# Patient Record
Sex: MALE | Race: WHITE | Employment: UNEMPLOYED | URBAN - METROPOLITAN AREA
[De-identification: names, ages, dates, MRNs, and addresses within clinical notes are randomized per-mention and may not be internally consistent; named-entity substitution may affect disease eponyms.]

---

## 2019-03-08 ENCOUNTER — HOSPITAL ENCOUNTER (EMERGENCY)
Age: 54
Discharge: HOME | End: 2019-03-08
Attending: EMERGENCY MEDICINE
Payer: COMMERCIAL

## 2019-03-08 ENCOUNTER — APPOINTMENT (OUTPATIENT)
Dept: GENERAL RADIOLOGY | Age: 54
End: 2019-03-08
Payer: COMMERCIAL

## 2019-03-08 VITALS
SYSTOLIC BLOOD PRESSURE: 113 MMHG | TEMPERATURE: 98 F | OXYGEN SATURATION: 98 % | RESPIRATION RATE: 16 BRPM | HEART RATE: 86 BPM | HEIGHT: 72 IN | DIASTOLIC BLOOD PRESSURE: 90 MMHG | BODY MASS INDEX: 33.09 KG/M2 | WEIGHT: 244.27 LBS

## 2019-03-08 DIAGNOSIS — H65.00 ACUTE SEROUS OTITIS MEDIA, RECURRENCE NOT SPECIFIED, UNSPECIFIED LATERALITY: ICD-10-CM

## 2019-03-08 DIAGNOSIS — J40 BRONCHITIS: Primary | ICD-10-CM

## 2019-03-08 LAB
RAPID INFLUENZA  B AGN: NEGATIVE
RAPID INFLUENZA A AGN: NEGATIVE
S PYO AG THROAT QL: NEGATIVE

## 2019-03-08 PROCEDURE — 6370000000 HC RX 637 (ALT 250 FOR IP): Performed by: EMERGENCY MEDICINE

## 2019-03-08 PROCEDURE — 99284 EMERGENCY DEPT VISIT MOD MDM: CPT

## 2019-03-08 PROCEDURE — 87880 STREP A ASSAY W/OPTIC: CPT

## 2019-03-08 PROCEDURE — 94664 DEMO&/EVAL PT USE INHALER: CPT

## 2019-03-08 PROCEDURE — 94640 AIRWAY INHALATION TREATMENT: CPT

## 2019-03-08 PROCEDURE — 87081 CULTURE SCREEN ONLY: CPT

## 2019-03-08 PROCEDURE — 87804 INFLUENZA ASSAY W/OPTIC: CPT

## 2019-03-08 PROCEDURE — 71046 X-RAY EXAM CHEST 2 VIEWS: CPT

## 2019-03-08 RX ORDER — AMOXICILLIN 500 MG/1
1000 CAPSULE ORAL 2 TIMES DAILY
Qty: 40 CAPSULE | Refills: 0 | Status: SHIPPED | OUTPATIENT
Start: 2019-03-08 | End: 2019-03-18

## 2019-03-08 RX ORDER — PREDNISONE 20 MG/1
60 TABLET ORAL ONCE
Status: COMPLETED | OUTPATIENT
Start: 2019-03-08 | End: 2019-03-08

## 2019-03-08 RX ORDER — GUAIFENESIN AND CODEINE PHOSPHATE 100; 10 MG/5ML; MG/5ML
5 SOLUTION ORAL 3 TIMES DAILY PRN
Qty: 30 ML | Refills: 0 | Status: SHIPPED | OUTPATIENT
Start: 2019-03-08 | End: 2019-03-11

## 2019-03-08 RX ORDER — IPRATROPIUM BROMIDE AND ALBUTEROL SULFATE 2.5; .5 MG/3ML; MG/3ML
1 SOLUTION RESPIRATORY (INHALATION) ONCE
Status: COMPLETED | OUTPATIENT
Start: 2019-03-08 | End: 2019-03-08

## 2019-03-08 RX ORDER — ALBUTEROL SULFATE 90 UG/1
2 AEROSOL, METERED RESPIRATORY (INHALATION) EVERY 4 HOURS PRN
Qty: 1 INHALER | Refills: 0 | Status: SHIPPED | OUTPATIENT
Start: 2019-03-08 | End: 2020-03-02

## 2019-03-08 RX ORDER — PREDNISONE 50 MG/1
50 TABLET ORAL DAILY
Qty: 5 TABLET | Refills: 0 | Status: SHIPPED | OUTPATIENT
Start: 2019-03-08 | End: 2019-03-13

## 2019-03-08 RX ADMIN — PREDNISONE 60 MG: 20 TABLET ORAL at 04:08

## 2019-03-08 RX ADMIN — IPRATROPIUM BROMIDE AND ALBUTEROL SULFATE 1 AMPULE: .5; 3 SOLUTION RESPIRATORY (INHALATION) at 03:46

## 2019-03-08 ASSESSMENT — ENCOUNTER SYMPTOMS
CHOKING: 0
NAUSEA: 0
SHORTNESS OF BREATH: 1
EYE PAIN: 0
COLOR CHANGE: 0
CHEST TIGHTNESS: 0
ANAL BLEEDING: 0
EYE DISCHARGE: 0
WHEEZING: 0
EYE ITCHING: 0
RECTAL PAIN: 0
STRIDOR: 0
EYE REDNESS: 0
APNEA: 0
COUGH: 1
PHOTOPHOBIA: 0
CONSTIPATION: 0
ABDOMINAL DISTENTION: 0
VOMITING: 0
ABDOMINAL PAIN: 0
BLOOD IN STOOL: 0
DIARRHEA: 0
BACK PAIN: 0

## 2019-03-08 ASSESSMENT — PAIN SCALES - GENERAL
PAINLEVEL_OUTOF10: 8
PAINLEVEL_OUTOF10: 8

## 2019-03-08 ASSESSMENT — PAIN DESCRIPTION - LOCATION: LOCATION: THROAT

## 2019-03-08 ASSESSMENT — PAIN DESCRIPTION - ONSET: ONSET: GRADUAL

## 2019-03-08 ASSESSMENT — PAIN DESCRIPTION - PROGRESSION: CLINICAL_PROGRESSION: GRADUALLY WORSENING

## 2019-03-08 ASSESSMENT — PAIN DESCRIPTION - PAIN TYPE: TYPE: ACUTE PAIN

## 2019-03-08 ASSESSMENT — PAIN DESCRIPTION - DESCRIPTORS: DESCRIPTORS: SORE

## 2019-03-08 ASSESSMENT — PAIN DESCRIPTION - FREQUENCY: FREQUENCY: CONTINUOUS

## 2019-03-10 LAB — S PYO THROAT QL CULT: NORMAL

## 2019-04-10 ENCOUNTER — APPOINTMENT (OUTPATIENT)
Dept: GENERAL RADIOLOGY | Age: 54
End: 2019-04-10
Payer: COMMERCIAL

## 2019-04-10 ENCOUNTER — HOSPITAL ENCOUNTER (EMERGENCY)
Age: 54
Discharge: HOME | End: 2019-04-10
Payer: COMMERCIAL

## 2019-04-10 VITALS
OXYGEN SATURATION: 98 % | HEART RATE: 72 BPM | RESPIRATION RATE: 16 BRPM | SYSTOLIC BLOOD PRESSURE: 147 MMHG | TEMPERATURE: 97.8 F | DIASTOLIC BLOOD PRESSURE: 99 MMHG

## 2019-04-10 DIAGNOSIS — S20.212A RIB CONTUSION, LEFT, INITIAL ENCOUNTER: ICD-10-CM

## 2019-04-10 DIAGNOSIS — S86.912A KNEE STRAIN, LEFT, INITIAL ENCOUNTER: ICD-10-CM
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
## 2019-04-10 DIAGNOSIS — S39.012A BACK STRAIN, INITIAL ENCOUNTER: ICD-10-CM

## 2019-04-10 PROCEDURE — 71101 X-RAY EXAM UNILAT RIBS/CHEST: CPT

## 2019-04-10 PROCEDURE — 6370000000 HC RX 637 (ALT 250 FOR IP): Performed by: PHYSICIAN ASSISTANT

## 2019-04-10 PROCEDURE — 73560 X-RAY EXAM OF KNEE 1 OR 2: CPT

## 2019-04-10 PROCEDURE — 99284 EMERGENCY DEPT VISIT MOD MDM: CPT

## 2019-04-10 PROCEDURE — 72100 X-RAY EXAM L-S SPINE 2/3 VWS: CPT

## 2019-04-10 RX ORDER — OXYCODONE HYDROCHLORIDE AND ACETAMINOPHEN 5; 325 MG/1; MG/1
1 TABLET ORAL ONCE
Status: COMPLETED | OUTPATIENT
Start: 2019-04-10 | End: 2019-04-10

## 2019-04-10 RX ORDER — IBUPROFEN 600 MG/1
600 TABLET ORAL EVERY 6 HOURS PRN
Qty: 30 TABLET | Refills: 0 | Status: ON HOLD | OUTPATIENT
Start: 2019-04-10 | End: 2019-11-08 | Stop reason: HOSPADM

## 2019-04-10 RX ADMIN — OXYCODONE HYDROCHLORIDE AND ACETAMINOPHEN 1 TABLET: 5; 325 TABLET ORAL at 18:41

## 2019-04-10 ASSESSMENT — PAIN DESCRIPTION - ONSET: ONSET: SUDDEN

## 2019-04-10 ASSESSMENT — PAIN SCALES - GENERAL
PAINLEVEL_OUTOF10: 7
PAINLEVEL_OUTOF10: 3
PAINLEVEL_OUTOF10: 7

## 2019-04-10 ASSESSMENT — PAIN DESCRIPTION - PROGRESSION: CLINICAL_PROGRESSION: NOT CHANGED

## 2019-04-10 ASSESSMENT — PAIN DESCRIPTION - FREQUENCY: FREQUENCY: CONTINUOUS

## 2019-04-10 ASSESSMENT — PAIN DESCRIPTION - PAIN TYPE: TYPE: ACUTE PAIN

## 2019-04-10 ASSESSMENT — PAIN DESCRIPTION - DESCRIPTORS: DESCRIPTORS: ACHING

## 2019-04-10 ASSESSMENT — ENCOUNTER SYMPTOMS
NAUSEA: 0
BACK PAIN: 1
SHORTNESS OF BREATH: 0

## 2019-04-10 NOTE — ED TRIAGE NOTES
tripped over wife yesterday and hit his left ribs, twisted the left knee, and has right shoulder pain and low back pain .

## 2019-04-10 NOTE — ED NOTES
Dc instructions completed with pt. Pt verbalized understanding. rx x1. Pt refused crutches. Pt was taken to exit in wheelchair and wife drove home.      Froilan Prabhakar RN  04/10/19 6432

## 2019-04-11 NOTE — ED PROVIDER NOTES
[facility]  eMERGENCY dEPARTMENT eNCOUnter      Pt Name: Geeta Hood  MRN: [id number]  [place] 1965  Date of evaluation: 4/10/2019  Provider: Arun Ayala PA-C    CHIEF COMPLAINT       Chief Complaint   Patient presents with   Gabriel Avina     tripped over wife yesterday and hit his left ribs, twisted the left knee, and has right shoulder pain and low back pain . HISTORYOF PRESENT ILLNESS  (Location/Symptom, Timing/Onset, Context/Setting, Quality, Duration, Modifying Factors, Severity.)   Oral RODRIGO Prince is a 48 y.o. male who presents to the emergency department after a fall. Last night the patient tripped over his wife who is lying on the ground, struck his left ribs on the far and twisted his left knee. Since then he has had left rib pain, left knee pain and low back pain. Pain is constant and worse with movement or weightbearing. He took ibuprofen 2 hours ago. This has not helped the pain. He rates the pain 7/10. He denies numbness or weakness, shortness of breath, abdominal pain. There was no head injury. Denies neck pain. Nursing Notes were reviewedand I agree. REVIEW OF SYSTEMS    (2-9 systems for level 4, 10 or more forlevel 5)     Review of Systems   Constitutional: Negative for chills and fever. Respiratory: Negative for shortness of breath. Cardiovascular: Positive for chest pain (rib pain from injury). Gastrointestinal: Negative for nausea. Genitourinary: Negative for difficulty urinating. Musculoskeletal: Positive for arthralgias and back pain. Negative for neck pain. Skin: Negative for rash and wound. Neurological: Negative for weakness, numbness and headaches. All other systems reviewed and are negative. Except as noted above the remainder ofthe review of systems was reviewed and negative.        PAST MEDICALHISTORY         Diagnosis Date    Arthritis     Depression        SURGICAL HISTORY           Procedure Laterality Date    FRACTURE SURGERY      LEG SURGERY Right     SHOULDER SURGERY      x 4       CURRENT MEDICATIONS       Discharge Medication List as of 4/10/2019  7:34 PM      CONTINUE these medications which have NOT CHANGED    Details   albuterol sulfate HFA (PROVENTIL HFA) 108 (90 Base) MCG/ACT inhaler Inhale 2 puffs into the lungs every 4 hours as needed for Wheezing or Shortness of Breath (Space out to every 6 hours as symptoms improve) Space out to every 6 hours as symptoms improve., Disp-1 Inhaler, R-0Print             ALLERGIES     Reglan [metoclopramide]    FAMILY HISTORY     History reviewed. No pertinent family history. No family status information on file. SOCIAL HISTORY    reports that he has been smoking cigarettes. He has been smoking about 1.00 pack per day. He has never used smokeless tobacco. He reports that he drinks alcohol. He reports that he does not use drugs. PHYSICAL EXAM    (up to 7 for level 4, 8 or more for level 5)     ED Triage Vitals [04/10/19 1808]   BP Temp Temp src Pulse Resp SpO2 Height Weight   (!) 147/99 97.8 °F (36.6 °C) -- 72 16 98 % -- --       Physical Exam   Constitutional: He is oriented to person, place, and time. He appears well-developed and well-nourished. No distress. HENT:   Head: Normocephalic and atraumatic. Neck: Neck supple. Cardiovascular: Normal rate, regular rhythm and normal heart sounds. Pulmonary/Chest: Effort normal and breath sounds normal. No respiratory distress. He exhibits tenderness (lateral aspect of left ribs with ecchymosis and abrasions, no flail chest or crepitus). Musculoskeletal: Normal range of motion. Tenderness and mild swelling about left knee. No effusion noted. Full range of motion with intact extensor mechanism. Bilateral paraspinous low back tenderness without midline tenderness. Neurological: He is alert and oriented to person, place, and time. No sensory deficit. He exhibits normal muscle tone.    Skin: Skin is warm and dry. Psychiatric: He has a normal mood and affect. His behavior is normal.   Nursing note and vitals reviewed. DIAGNOSTIC RESULTS     RADIOLOGY:   Non-plain film images such as CT, Ultrasound and MRI are read by the radiologist.Plain radiographic images are visualized and preliminarily interpreted by Martín Ball PA-C with the below findings:        Interpretation per the Radiologist below, if available at the time of this note:    XR RIBS LEFT INCLUDE CHEST (MIN 3 VIEWS)   Final Result   1. No acute cardiopulmonary disease. 2. No evidence of a left-sided rib fracture. XR LUMBAR SPINE (2-3 VIEWS)   Final Result   No acute bony injury lumbar spine. Mild multilevel degenerative disc changes   within the lower thoracic and lumbar spine. XR KNEE LEFT (1-2 VIEWS)   Final Result   No acute bony injury left knee. LABS:  Labs Reviewed - No data to display    All other labs were within normal range or not returned as of this dictation. EMERGENCY DEPARTMENT COURSE and DIFFERENTIAL DIAGNOSIS/MDM:   Vitals:    Vitals:    04/10/19 1808   BP: (!) 147/99   Pulse: 72   Resp: 16   Temp: 97.8 °F (36.6 °C)   SpO2: 98%        I have evaluated this patient. My supervising physician was available for consultation. This is a mechanical fall. He is neurovascularly intact. There is no head injury. He was sent for x-ray of left knee, lumbar spine and left ribs and chest all of which were unremarkable. He was reassured. His pain was treated while here with Percocet. He is chronically on Norco.  I haven't advised scheduled ibuprofen in addition to Destiney Barrio.  He was told to use ice packs 20 minutes at a time several times daily. He was asked to follow up with orthopedics next week for reassessment. Discussed results, diagnosis and plan with patient and/or family. Questions addressed. Dispositionand follow-up agreed upon. Specific discharge instructions explained.  The patient and/or family and I have discussed the diagnosis and risks, and we agree with discharging home to follow-up with their primary care,specialist or referral doctor. We also discussed returning to the Emergency Department immediately if new or worsening symptoms occur. We have discussed the symptoms which are most concerning that necessitate immediatereturn. PROCEDURES:  None    FINAL IMPRESSION      1. Fall, initial encounter    2. Knee strain, left, initial encounter    3. Rib contusion, left, initial encounter    4.  Back strain, initial encounter          DISPOSITION/PLAN   DISPOSITION Decision To Discharge 04/10/2019 07:27:30 PM      PATIENT REFERRED TO:  Janie Santoyo, [address] [facility]  [address] [place] [address]  [phone]    Schedule an appointment as soon as possible for a visit       [facility] Emergency Department  [address] [place] [address]  [phone]    As needed, If symptoms worsen      DISCHARGE MEDICATIONS:  Discharge Medication List as of 4/10/2019  7:34 PM      START taking these medications    Details   ibuprofen (IBU) 600 MG tablet Take 1 tablet by mouth every 6 hours as needed for Pain, Disp-30 tablet, R-0Print             (Please note that portions of this note were completed with a voice recognition program.  Efforts were made toedit the dictations but occasionally words are mis-transcribed.)    EYAL Aburto PA-C  04/10/19 3054

## 2019-06-16 ENCOUNTER — HOSPITAL ENCOUNTER (EMERGENCY)
Age: 54
Discharge: HOME | End: 2019-06-16
Attending: EMERGENCY MEDICINE
Payer: COMMERCIAL

## 2019-06-16 ENCOUNTER — APPOINTMENT (OUTPATIENT)
Dept: CT IMAGING | Age: 54
End: 2019-06-16
Payer: COMMERCIAL

## 2019-06-16 VITALS
TEMPERATURE: 98.9 F | RESPIRATION RATE: 16 BRPM | HEART RATE: 102 BPM | DIASTOLIC BLOOD PRESSURE: 61 MMHG | OXYGEN SATURATION: 96 % | WEIGHT: 234.79 LBS | SYSTOLIC BLOOD PRESSURE: 122 MMHG | BODY MASS INDEX: 31.84 KG/M2

## 2019-06-16 DIAGNOSIS — R10.11 RIGHT UPPER QUADRANT ABDOMINAL PAIN: Primary | ICD-10-CM

## 2019-06-16 LAB
A/G RATIO: 1.7 (ref 1.1–2.2)
ALBUMIN SERPL-MCNC: 4.3 G/DL (ref 3.4–5)
ALP BLD-CCNC: 87 U/L (ref 40–129)
ALT SERPL-CCNC: 37 U/L (ref 10–40)
ANION GAP SERPL CALCULATED.3IONS-SCNC: 15 MMOL/L (ref 3–16)
AST SERPL-CCNC: 47 U/L (ref 15–37)
BILIRUB SERPL-MCNC: 0.6 MG/DL (ref 0–1)
BUN BLDV-MCNC: 17 MG/DL (ref 7–20)
CALCIUM SERPL-MCNC: 8.7 MG/DL (ref 8.3–10.6)
CHLORIDE BLD-SCNC: 101 MMOL/L (ref 99–110)
CO2: 22 MMOL/L (ref 21–32)
CREAT SERPL-MCNC: 1.2 MG/DL (ref 0.9–1.3)
GFR AFRICAN AMERICAN: >60
GFR NON-AFRICAN AMERICAN: >60
GLOBULIN: 2.5 G/DL
GLUCOSE BLD-MCNC: 105 MG/DL (ref 70–99)
HCT VFR BLD CALC: 44.9 % (ref 40.5–52.5)
HEMOGLOBIN: 15.1 G/DL (ref 13.5–17.5)
LIPASE: 21 U/L (ref 13–60)
MCH RBC QN AUTO: 30.1 PG (ref 26–34)
MCHC RBC AUTO-ENTMCNC: 33.7 G/DL (ref 31–36)
MCV RBC AUTO: 89.3 FL (ref 80–100)
PDW BLD-RTO: 13.9 % (ref 12.4–15.4)
PLATELET # BLD: 341 K/UL (ref 135–450)
PMV BLD AUTO: 8.9 FL (ref 5–10.5)
POTASSIUM REFLEX MAGNESIUM: 4 MMOL/L (ref 3.5–5.1)
RBC # BLD: 5.03 M/UL (ref 4.2–5.9)
SODIUM BLD-SCNC: 138 MMOL/L (ref 136–145)
TOTAL PROTEIN: 6.8 G/DL (ref 6.4–8.2)
WBC # BLD: 10.1 K/UL (ref 4–11)

## 2019-06-16 PROCEDURE — 96375 TX/PRO/DX INJ NEW DRUG ADDON: CPT

## 2019-06-16 PROCEDURE — 85027 COMPLETE CBC AUTOMATED: CPT

## 2019-06-16 PROCEDURE — 96374 THER/PROPH/DIAG INJ IV PUSH: CPT

## 2019-06-16 PROCEDURE — 6360000002 HC RX W HCPCS: Performed by: EMERGENCY MEDICINE

## 2019-06-16 PROCEDURE — 99284 EMERGENCY DEPT VISIT MOD MDM: CPT

## 2019-06-16 PROCEDURE — 80053 COMPREHEN METABOLIC PANEL: CPT

## 2019-06-16 PROCEDURE — 74176 CT ABD & PELVIS W/O CONTRAST: CPT

## 2019-06-16 PROCEDURE — 83690 ASSAY OF LIPASE: CPT

## 2019-06-16 RX ORDER — MORPHINE SULFATE 2 MG/ML
4 INJECTION, SOLUTION INTRAMUSCULAR; INTRAVENOUS ONCE
Status: COMPLETED | OUTPATIENT
Start: 2019-06-16 | End: 2019-06-16

## 2019-06-16 RX ORDER — DEXTROAMPHETAMINE SACCHARATE, AMPHETAMINE ASPARTATE, DEXTROAMPHETAMINE SULFATE AND AMPHETAMINE SULFATE 5; 5; 5; 5 MG/1; MG/1; MG/1; MG/1
20 TABLET ORAL DAILY
COMMUNITY
End: 2019-08-29 | Stop reason: SDUPTHER

## 2019-06-16 RX ORDER — KETOROLAC TROMETHAMINE 30 MG/ML
15 INJECTION, SOLUTION INTRAMUSCULAR; INTRAVENOUS ONCE
Status: COMPLETED | OUTPATIENT
Start: 2019-06-16 | End: 2019-06-16

## 2019-06-16 RX ADMIN — KETOROLAC TROMETHAMINE 15 MG: 30 INJECTION, SOLUTION INTRAMUSCULAR at 02:53

## 2019-06-16 RX ADMIN — MORPHINE SULFATE 4 MG: 2 INJECTION, SOLUTION INTRAMUSCULAR; INTRAVENOUS at 03:30

## 2019-06-16 ASSESSMENT — PAIN DESCRIPTION - FREQUENCY: FREQUENCY: CONTINUOUS

## 2019-06-16 ASSESSMENT — PAIN DESCRIPTION - ONSET: ONSET: ON-GOING

## 2019-06-16 ASSESSMENT — PAIN DESCRIPTION - LOCATION
LOCATION: ABDOMEN
LOCATION: ABDOMEN

## 2019-06-16 ASSESSMENT — PAIN DESCRIPTION - PROGRESSION: CLINICAL_PROGRESSION: GRADUALLY WORSENING

## 2019-06-16 ASSESSMENT — PAIN SCALES - GENERAL
PAINLEVEL_OUTOF10: 8
PAINLEVEL_OUTOF10: 8
PAINLEVEL_OUTOF10: 6
PAINLEVEL_OUTOF10: 8

## 2019-06-16 ASSESSMENT — PAIN DESCRIPTION - DESCRIPTORS: DESCRIPTORS: SQUEEZING;DISCOMFORT

## 2019-06-16 ASSESSMENT — PAIN DESCRIPTION - PAIN TYPE
TYPE: ACUTE PAIN
TYPE: ACUTE PAIN

## 2019-06-16 ASSESSMENT — PAIN DESCRIPTION - ORIENTATION: ORIENTATION: RIGHT;UPPER

## 2019-06-16 ASSESSMENT — PAIN - FUNCTIONAL ASSESSMENT: PAIN_FUNCTIONAL_ASSESSMENT: ACTIVITIES ARE NOT PREVENTED

## 2019-08-19 ENCOUNTER — HOSPITAL ENCOUNTER (OUTPATIENT)
Dept: ULTRASOUND IMAGING | Age: 54
Discharge: HOME | End: 2019-08-19
Payer: COMMERCIAL

## 2019-08-19 DIAGNOSIS — Z00.00 EXAMINATION, MEDICAL, GENERAL: ICD-10-CM

## 2019-08-22 ENCOUNTER — APPOINTMENT (OUTPATIENT)
Dept: ULTRASOUND IMAGING | Age: 54
End: 2019-08-22
Payer: COMMERCIAL

## 2019-08-22 ENCOUNTER — APPOINTMENT (OUTPATIENT)
Dept: CT IMAGING | Age: 54
End: 2019-08-22
Payer: COMMERCIAL

## 2019-08-22 ENCOUNTER — HOSPITAL ENCOUNTER (EMERGENCY)
Age: 54
Discharge: HOME | End: 2019-08-22
Attending: EMERGENCY MEDICINE
Payer: COMMERCIAL

## 2019-08-22 VITALS
RESPIRATION RATE: 16 BRPM | OXYGEN SATURATION: 100 % | SYSTOLIC BLOOD PRESSURE: 127 MMHG | BODY MASS INDEX: 31.72 KG/M2 | HEART RATE: 88 BPM | WEIGHT: 233.91 LBS | TEMPERATURE: 98 F | DIASTOLIC BLOOD PRESSURE: 70 MMHG

## 2019-08-22 DIAGNOSIS — R10.9 FLANK PAIN: Primary | ICD-10-CM

## 2019-08-22 DIAGNOSIS — R30.0 DYSURIA: ICD-10-CM

## 2019-08-22 LAB
A/G RATIO: 1.7 (ref 1.1–2.2)
ALBUMIN SERPL-MCNC: 4.2 G/DL (ref 3.4–5)
ALP BLD-CCNC: 90 U/L (ref 40–129)
ALT SERPL-CCNC: 24 U/L (ref 10–40)
ANION GAP SERPL CALCULATED.3IONS-SCNC: 11 MMOL/L (ref 3–16)
AST SERPL-CCNC: 21 U/L (ref 15–37)
BASOPHILS ABSOLUTE: 0.1 K/UL (ref 0–0.2)
BASOPHILS RELATIVE PERCENT: 1.1 %
BILIRUB SERPL-MCNC: 0.5 MG/DL (ref 0–1)
BILIRUBIN URINE: ABNORMAL
BLOOD, URINE: ABNORMAL
BUN BLDV-MCNC: 13 MG/DL (ref 7–20)
CALCIUM SERPL-MCNC: 9.1 MG/DL (ref 8.3–10.6)
CHLORIDE BLD-SCNC: 102 MMOL/L (ref 99–110)
CLARITY: CLEAR
CO2: 23 MMOL/L (ref 21–32)
COLOR: ABNORMAL
CREAT SERPL-MCNC: 1.2 MG/DL (ref 0.9–1.3)
EKG ATRIAL RATE: 59 BPM
EKG DIAGNOSIS: NORMAL
EKG P AXIS: 53 DEGREES
EKG P-R INTERVAL: 162 MS
EKG Q-T INTERVAL: 380 MS
EKG QRS DURATION: 92 MS
EKG QTC CALCULATION (BAZETT): 376 MS
EKG R AXIS: 55 DEGREES
EKG T AXIS: 49 DEGREES
EKG VENTRICULAR RATE: 59 BPM
EOSINOPHILS ABSOLUTE: 0.2 K/UL (ref 0–0.6)
EOSINOPHILS RELATIVE PERCENT: 2.4 %
EPITHELIAL CELLS, UA: 0 /HPF (ref 0–5)
GFR AFRICAN AMERICAN: >60
GFR NON-AFRICAN AMERICAN: >60
GLOBULIN: 2.5 G/DL
GLUCOSE BLD-MCNC: 120 MG/DL (ref 70–99)
GLUCOSE URINE: ABNORMAL MG/DL
HCT VFR BLD CALC: 41.8 % (ref 40.5–52.5)
HEMOGLOBIN: 14.2 G/DL (ref 13.5–17.5)
HYALINE CASTS: 1 /LPF (ref 0–8)
KETONES, URINE: ABNORMAL MG/DL
LEUKOCYTE ESTERASE, URINE: ABNORMAL
LIPASE: 34 U/L (ref 13–60)
LYMPHOCYTES ABSOLUTE: 1.5 K/UL (ref 1–5.1)
LYMPHOCYTES RELATIVE PERCENT: 23.2 %
MCH RBC QN AUTO: 29.5 PG (ref 26–34)
MCHC RBC AUTO-ENTMCNC: 33.9 G/DL (ref 31–36)
MCV RBC AUTO: 86.9 FL (ref 80–100)
MICROSCOPIC EXAMINATION: YES
MONOCYTES ABSOLUTE: 0.6 K/UL (ref 0–1.3)
MONOCYTES RELATIVE PERCENT: 9.5 %
NEUTROPHILS ABSOLUTE: 4.2 K/UL (ref 1.7–7.7)
NEUTROPHILS RELATIVE PERCENT: 63.8 %
NITRITE, URINE: ABNORMAL
PDW BLD-RTO: 16.4 % (ref 12.4–15.4)
PH UA: ABNORMAL (ref 5–8)
PLATELET # BLD: 225 K/UL (ref 135–450)
PMV BLD AUTO: 9.4 FL (ref 5–10.5)
POTASSIUM REFLEX MAGNESIUM: 4.3 MMOL/L (ref 3.5–5.1)
PROTEIN UA: ABNORMAL MG/DL
RBC # BLD: 4.8 M/UL (ref 4.2–5.9)
RBC UA: 1 /HPF (ref 0–4)
SODIUM BLD-SCNC: 136 MMOL/L (ref 136–145)
SPECIFIC GRAVITY UA: >1.03 (ref 1–1.03)
TOTAL CK: 125 U/L (ref 39–308)
TOTAL PROTEIN: 6.7 G/DL (ref 6.4–8.2)
TROPONIN: <0.01 NG/ML
URINE REFLEX TO CULTURE: ABNORMAL
URINE TYPE: ABNORMAL
UROBILINOGEN, URINE: ABNORMAL E.U./DL
WBC # BLD: 6.7 K/UL (ref 4–11)
WBC UA: 1 /HPF (ref 0–5)

## 2019-08-22 PROCEDURE — 96365 THER/PROPH/DIAG IV INF INIT: CPT

## 2019-08-22 PROCEDURE — 99284 EMERGENCY DEPT VISIT MOD MDM: CPT

## 2019-08-22 PROCEDURE — 82550 ASSAY OF CK (CPK): CPT

## 2019-08-22 PROCEDURE — 84484 ASSAY OF TROPONIN QUANT: CPT

## 2019-08-22 PROCEDURE — 93005 ELECTROCARDIOGRAM TRACING: CPT | Performed by: EMERGENCY MEDICINE

## 2019-08-22 PROCEDURE — 6360000002 HC RX W HCPCS: Performed by: EMERGENCY MEDICINE

## 2019-08-22 PROCEDURE — 83690 ASSAY OF LIPASE: CPT

## 2019-08-22 PROCEDURE — 76705 ECHO EXAM OF ABDOMEN: CPT

## 2019-08-22 PROCEDURE — 96375 TX/PRO/DX INJ NEW DRUG ADDON: CPT

## 2019-08-22 PROCEDURE — 85025 COMPLETE CBC W/AUTO DIFF WBC: CPT

## 2019-08-22 PROCEDURE — 6360000004 HC RX CONTRAST MEDICATION

## 2019-08-22 PROCEDURE — 80053 COMPREHEN METABOLIC PANEL: CPT

## 2019-08-22 PROCEDURE — 2580000003 HC RX 258: Performed by: EMERGENCY MEDICINE

## 2019-08-22 PROCEDURE — 87086 URINE CULTURE/COLONY COUNT: CPT

## 2019-08-22 PROCEDURE — 93010 ELECTROCARDIOGRAM REPORT: CPT | Performed by: INTERNAL MEDICINE

## 2019-08-22 PROCEDURE — 96376 TX/PRO/DX INJ SAME DRUG ADON: CPT

## 2019-08-22 PROCEDURE — 71275 CT ANGIOGRAPHY CHEST: CPT

## 2019-08-22 PROCEDURE — 74176 CT ABD & PELVIS W/O CONTRAST: CPT

## 2019-08-22 PROCEDURE — 81001 URINALYSIS AUTO W/SCOPE: CPT

## 2019-08-22 RX ORDER — CIPROFLOXACIN 500 MG/1
500 TABLET, FILM COATED ORAL 2 TIMES DAILY
Qty: 20 TABLET | Refills: 0 | Status: SHIPPED | OUTPATIENT
Start: 2019-08-22 | End: 2019-09-01

## 2019-08-22 RX ORDER — TRAMADOL HYDROCHLORIDE 50 MG/1
50 TABLET ORAL EVERY 6 HOURS PRN
Qty: 10 TABLET | Refills: 0 | Status: SHIPPED | OUTPATIENT
Start: 2019-08-22 | End: 2019-09-05 | Stop reason: SDUPTHER

## 2019-08-22 RX ORDER — ONDANSETRON 2 MG/ML
4 INJECTION INTRAMUSCULAR; INTRAVENOUS ONCE
Status: COMPLETED | OUTPATIENT
Start: 2019-08-22 | End: 2019-08-22

## 2019-08-22 RX ADMIN — HYDROMORPHONE HYDROCHLORIDE 0.5 MG: 1 INJECTION, SOLUTION INTRAMUSCULAR; INTRAVENOUS; SUBCUTANEOUS at 16:32

## 2019-08-22 RX ADMIN — IOPAMIDOL 75 ML: 755 INJECTION, SOLUTION INTRAVENOUS at 15:54

## 2019-08-22 RX ADMIN — ONDANSETRON 4 MG: 2 INJECTION INTRAMUSCULAR; INTRAVENOUS at 13:51

## 2019-08-22 RX ADMIN — HYDROMORPHONE HYDROCHLORIDE 0.5 MG: 1 INJECTION, SOLUTION INTRAMUSCULAR; INTRAVENOUS; SUBCUTANEOUS at 13:53

## 2019-08-22 RX ADMIN — CEFTRIAXONE 1 G: 1 INJECTION, POWDER, FOR SOLUTION INTRAMUSCULAR; INTRAVENOUS at 16:35

## 2019-08-22 RX ADMIN — HYDROMORPHONE HYDROCHLORIDE 0.5 MG: 1 INJECTION, SOLUTION INTRAMUSCULAR; INTRAVENOUS; SUBCUTANEOUS at 15:09

## 2019-08-22 ASSESSMENT — PAIN DESCRIPTION - LOCATION
LOCATION: BACK
LOCATION: FLANK

## 2019-08-22 ASSESSMENT — PAIN - FUNCTIONAL ASSESSMENT
PAIN_FUNCTIONAL_ASSESSMENT: ACTIVITIES ARE NOT PREVENTED
PAIN_FUNCTIONAL_ASSESSMENT: 0-10
PAIN_FUNCTIONAL_ASSESSMENT: PREVENTS OR INTERFERES SOME ACTIVE ACTIVITIES AND ADLS

## 2019-08-22 ASSESSMENT — PAIN SCALES - GENERAL
PAINLEVEL_OUTOF10: 8
PAINLEVEL_OUTOF10: 6
PAINLEVEL_OUTOF10: 7
PAINLEVEL_OUTOF10: 8
PAINLEVEL_OUTOF10: 8
PAINLEVEL_OUTOF10: 6
PAINLEVEL_OUTOF10: 6
PAINLEVEL_OUTOF10: 8
PAINLEVEL_OUTOF10: 6
PAINLEVEL_OUTOF10: 8

## 2019-08-22 ASSESSMENT — PAIN DESCRIPTION - FREQUENCY
FREQUENCY: CONTINUOUS

## 2019-08-22 ASSESSMENT — PAIN DESCRIPTION - ORIENTATION
ORIENTATION: RIGHT;LEFT

## 2019-08-22 ASSESSMENT — PAIN DESCRIPTION - PROGRESSION
CLINICAL_PROGRESSION: NOT CHANGED
CLINICAL_PROGRESSION: NOT CHANGED
CLINICAL_PROGRESSION: GRADUALLY WORSENING
CLINICAL_PROGRESSION: GRADUALLY WORSENING
CLINICAL_PROGRESSION: GRADUALLY IMPROVING

## 2019-08-22 ASSESSMENT — PAIN DESCRIPTION - DESCRIPTORS: DESCRIPTORS: ACHING;DULL

## 2019-08-22 ASSESSMENT — PAIN DESCRIPTION - PAIN TYPE
TYPE: ACUTE PAIN

## 2019-08-22 ASSESSMENT — PAIN DESCRIPTION - ONSET
ONSET: PROGRESSIVE
ONSET: ON-GOING
ONSET: ON-GOING

## 2019-08-22 NOTE — ED TRIAGE NOTES
Introduce myself to the patient, identification band inplace, stretcher in the lowest position for safety, and the call light is in reach. Updated patient on Emergency Department plan of care, no additional needs at this time, will continue to monitor patient.

## 2019-08-22 NOTE — ED NOTES
Pt requesting fluids; per Dr. Gary Goodson pt can have fluids.       Media NICOLE Parker  08/22/19 5677

## 2019-08-22 NOTE — ED PROVIDER NOTES
rebound. No masses. Negative Jones's sign. Unable to elicit any abdominal discomfort to palpation. No scrotal or testicular pain. Musculoskeletal: Extremities non-tender with full range of motion. Radial and dorsalis pedis pulses were intact. No calf tenderness erythema or edema. Mild tenderness noted in the bilateral lower lumbar areas. Mild discomfort with range of motion. No spasm. No point tenderness or step-off. Negative straight leg raising. Deep tendon reflexes were 2/4 bilaterally in the patellar and Achilles tendons. No saddle anesthesia. Neurological: Alert and oriented x 3. Speech clear. Cranial nerves II-XII intact. No facial droop. No acute focal motor or sensory deficits. Skin: Skin is warm and dry. No rash. Psychiatric: Normal mood and affect. Behavior is normal.         DIAGNOSTIC RESULTS     EKG: All EKG's are interpreted by the Emergency Department Physician who either signs or Co-signs this chart in the absence of a cardiologist.    Sinus bradycardia. Rate 59. DC interval 162 ms. QRS duration 92 ms. QTc 376 ms.  R axis 55 degrees. Normal EKG. RADIOLOGY:   Non-plain film images such as CT, Ultrasound and MRI are read by the radiologist. Plain radiographic images are visualized and preliminarily interpreted by the emergency physician with the below findings:        Interpretation per the Radiologist below, if available at the time of this note:    CTA CHEST ABDOMEN PELVIS W CONTRAST   Final Result   No aortic dissection. No acute intrathoracic nor abdominopelvic abnormality. US GALLBLADDER RUQ   Final Result   Limited is exam.  Patient is not NPO. Partially contracted gallbladder. No stones are evident. No significant dilation of the common duct. Mild hepatic steatosis. CT ABDOMEN PELVIS WO CONTRAST Additional Contrast? None   Final Result   No acute abnormality in the abdomen or pelvis.   Specifically, no evidence of urolithiasis.                ED BEDSIDE ULTRASOUND:   Performed by ED Physician - none    LABS:  Labs Reviewed   CBC WITH AUTO DIFFERENTIAL - Abnormal; Notable for the following components:       Result Value    RDW 16.4 (*)     All other components within normal limits    Narrative:     Performed at:  [facility]  [address] [facility] [address]   Phone [phone]   COMPREHENSIVE METABOLIC PANEL W/ REFLEX TO MG FOR LOW K - Abnormal; Notable for the following components:    Glucose 120 (*)     All other components within normal limits    Narrative:     Performed at:  [address] [address]   Phone [phone]   URINE RT REFLEX TO CULTURE - Abnormal; Notable for the following components:    Color, UA ORANGE (*)     Glucose, Ur Color Interfer (*)     Bilirubin Urine Color Interfer (*)     Ketones, Urine Color Interfer (*)     Blood, Urine Color Interfer (*)     pH, UA Color Interfer (*)     Protein, UA Color Interfer (*)     Urobilinogen, Urine Color Interfer (*)     Nitrite, Urine Color Interfer (*)     Leukocyte Esterase, Urine Color Interfer (*)     All other components within normal limits    Narrative:     Performed at:  [address] [address]   Phone [phone]   URINE CULTURE   LIPASE    Narrative:     Performed at:  [facility]  [address] [place] [address]   Phone [phone]   MICROSCOPIC URINALYSIS    Narrative:     Performed at:  [facility]  [address] [place] [address]   Phone [phone]   TROPONIN    Narrative:     Performed at:  [address] [address]   Phone [phone]   CK    Narrative:     Performed at:  [facility]  08 patient is feeling markedly improved. He is nontoxic in appearance. He is afebrile. He is hemodynamically stable. He is stable for outpatient management. He will be treated with Cipro 500 mg twice daily. Symptoms are suspicious for urinary tract infection but I suspect that it is partially treated. He was given Rocephin in the emergency department. Culture is pending. Differential diagnosis would also include prostatitis. I advised him to follow-up with his primary care physician in 1 to 2 days for reexamination. If his condition worsens or new symptoms develop, he was advised to return immediately to the emergency department. If his symptoms persist he may require referral to a urologist.  He actually reports that he plans to see a urologist anyway. He has already called to make an appointment. He is also had some separate symptoms in which she experiences epigastric discomfort after eating fatty foods. He may require further evaluation with a HIDA scan and I advised him to contact his primary care physician to schedule this to be done. CRITICAL CARE TIME   Total Critical Care time was 0 minutes, excluding separately reportable procedures. There was a high probability of clinically significant/life threatening deterioration in the patient's condition which required my urgent intervention. CONSULTS:  None    PROCEDURES:  Unless otherwise noted below, none     Procedures        FINAL IMPRESSION      1. Flank pain    2. Dysuria          DISPOSITION/PLAN   DISPOSITION        PATIENT REFERRED TO:  [facility]) Referral  Call [phone] for an appointment  Call today        DISCHARGE MEDICATIONS:  New Prescriptions    CIPROFLOXACIN (CIPRO) 500 MG TABLET    Take 1 tablet by mouth 2 times daily for 10 days    TRAMADOL (ULTRAM) 50 MG TABLET    Take 1 tablet by mouth every 6 hours as needed for Pain for up to 3 days. Intended supply: 3 days.  Take lowest dose possible to manage pain     Controlled

## 2019-08-22 NOTE — ED NOTES
Pt A&O to the ED; c/o flank pain that started x1 month. Pt stated able to tolerate until 3 days ago pain level became unbearable. Pt rates pain level 8/10; describes pain radiating from back to hips and down to knees. Pt states being nauseated and having diarrhea x1 day; denies seeing any blood. Pt stated having burning while urinating and increase frequency. Pt stated no history of kidney stones.        Tab Dwyer RN  08/22/19 5406 [facility], RN  08/22/19 3585

## 2019-08-22 NOTE — ED NOTES
Discharge and education instructions reviewed. Patient verbalized understanding, teach-back successful. Patient denied questions at this time. No acute distress noted. Patient instructed to follow-up as noted - return to emergency department if symptoms worsen. Patient verbalized understanding. Discharged per EDMD with discharged instructions.        Sima De La Cruz RN  08/22/19 0234

## 2019-08-24 LAB — URINE CULTURE, ROUTINE: NORMAL

## 2019-10-04 ENCOUNTER — APPOINTMENT (OUTPATIENT)
Dept: GENERAL RADIOLOGY | Age: 54
End: 2019-10-04
Payer: COMMERCIAL

## 2019-10-04 ENCOUNTER — HOSPITAL ENCOUNTER (EMERGENCY)
Age: 54
Discharge: HOME | End: 2019-10-04
Payer: COMMERCIAL

## 2019-10-04 VITALS
HEART RATE: 73 BPM | DIASTOLIC BLOOD PRESSURE: 92 MMHG | SYSTOLIC BLOOD PRESSURE: 137 MMHG | RESPIRATION RATE: 16 BRPM | TEMPERATURE: 97.5 F | OXYGEN SATURATION: 99 %
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
## 2019-10-04 DIAGNOSIS — M25.531 RIGHT WRIST PAIN: ICD-10-CM

## 2019-10-04 DIAGNOSIS — T14.8XXA ABRASION: ICD-10-CM

## 2019-10-04 DIAGNOSIS — Z23 NEED FOR TDAP VACCINATION: ICD-10-CM

## 2019-10-04 DIAGNOSIS — S99.921A INJURY OF TOE ON RIGHT FOOT, INITIAL ENCOUNTER: ICD-10-CM

## 2019-10-04 DIAGNOSIS — M25.521 RIGHT ELBOW PAIN: ICD-10-CM

## 2019-10-04 DIAGNOSIS — S49.91XA INJURY OF RIGHT SHOULDER, INITIAL ENCOUNTER: ICD-10-CM

## 2019-10-04 PROCEDURE — 6370000000 HC RX 637 (ALT 250 FOR IP): Performed by: NURSE PRACTITIONER

## 2019-10-04 PROCEDURE — 90471 IMMUNIZATION ADMIN: CPT | Performed by: NURSE PRACTITIONER

## 2019-10-04 PROCEDURE — 96372 THER/PROPH/DIAG INJ SC/IM: CPT

## 2019-10-04 PROCEDURE — 90715 TDAP VACCINE 7 YRS/> IM: CPT | Performed by: NURSE PRACTITIONER

## 2019-10-04 PROCEDURE — 73630 X-RAY EXAM OF FOOT: CPT

## 2019-10-04 PROCEDURE — 73110 X-RAY EXAM OF WRIST: CPT

## 2019-10-04 PROCEDURE — 6360000002 HC RX W HCPCS: Performed by: NURSE PRACTITIONER

## 2019-10-04 PROCEDURE — 73080 X-RAY EXAM OF ELBOW: CPT

## 2019-10-04 PROCEDURE — 99284 EMERGENCY DEPT VISIT MOD MDM: CPT

## 2019-10-04 PROCEDURE — 73030 X-RAY EXAM OF SHOULDER: CPT

## 2019-10-04 RX ORDER — ORPHENADRINE CITRATE 30 MG/ML
60 INJECTION INTRAMUSCULAR; INTRAVENOUS ONCE
Status: COMPLETED | OUTPATIENT
Start: 2019-10-04 | End: 2019-10-04

## 2019-10-04 RX ORDER — HYDROCODONE BITARTRATE AND ACETAMINOPHEN 5; 325 MG/1; MG/1
2 TABLET ORAL ONCE
Status: COMPLETED | OUTPATIENT
Start: 2019-10-04 | End: 2019-10-04

## 2019-10-04 RX ORDER — HYDROCODONE BITARTRATE AND ACETAMINOPHEN 5; 325 MG/1; MG/1
1 TABLET ORAL EVERY 6 HOURS PRN
Qty: 3 TABLET | Refills: 0 | Status: SHIPPED | OUTPATIENT
Start: 2019-10-04 | End: 2019-10-07

## 2019-10-04 RX ORDER — KETOROLAC TROMETHAMINE 30 MG/ML
30 INJECTION, SOLUTION INTRAMUSCULAR; INTRAVENOUS ONCE
Status: COMPLETED | OUTPATIENT
Start: 2019-10-04 | End: 2019-10-04

## 2019-10-04 RX ORDER — CYCLOBENZAPRINE HCL 10 MG
10 TABLET ORAL 3 TIMES DAILY PRN
Qty: 20 TABLET | Refills: 0 | Status: SHIPPED | OUTPATIENT
Start: 2019-10-04 | End: 2019-10-11

## 2019-10-04 RX ORDER — HYDROCODONE BITARTRATE AND ACETAMINOPHEN 5; 325 MG/1; MG/1
1 TABLET ORAL EVERY 6 HOURS PRN
Qty: 6 TABLET | Refills: 0 | Status: SHIPPED | OUTPATIENT
Start: 2019-10-04 | End: 2019-10-04 | Stop reason: SDUPTHER

## 2019-10-04 RX ADMIN — TETANUS TOXOID, REDUCED DIPHTHERIA TOXOID AND ACELLULAR PERTUSSIS VACCINE, ADSORBED 0.5 ML: 5; 2.5; 8; 8; 2.5 SUSPENSION INTRAMUSCULAR at 11:58

## 2019-10-04 RX ADMIN — KETOROLAC TROMETHAMINE 30 MG: 30 INJECTION, SOLUTION INTRAMUSCULAR at 13:03

## 2019-10-04 RX ADMIN — HYDROCODONE BITARTRATE AND ACETAMINOPHEN 2 TABLET: 5; 325 TABLET ORAL at 11:58

## 2019-10-04 RX ADMIN — ORPHENADRINE CITRATE 60 MG: 30 INJECTION INTRAMUSCULAR; INTRAVENOUS at 11:58

## 2019-10-04 ASSESSMENT — PAIN DESCRIPTION - DESCRIPTORS
DESCRIPTORS: ACHING
DESCRIPTORS: SHARP

## 2019-10-04 ASSESSMENT — PAIN SCALES - GENERAL
PAINLEVEL_OUTOF10: 10

## 2019-10-04 ASSESSMENT — PAIN DESCRIPTION - PAIN TYPE
TYPE: ACUTE PAIN

## 2019-10-04 ASSESSMENT — PAIN DESCRIPTION - FREQUENCY
FREQUENCY: CONTINUOUS

## 2019-10-04 ASSESSMENT — PAIN DESCRIPTION - LOCATION: LOCATION: SHOULDER;WRIST;ANKLE

## 2019-10-04 ASSESSMENT — PAIN DESCRIPTION - ORIENTATION
ORIENTATION: RIGHT

## 2019-10-04 ASSESSMENT — PAIN - FUNCTIONAL ASSESSMENT: PAIN_FUNCTIONAL_ASSESSMENT: 0-10

## 2019-11-04 ENCOUNTER — OFFICE VISIT (OUTPATIENT)
Dept: ORTHOPEDIC SURGERY | Age: 54
End: 2019-11-04
Payer: COMMERCIAL

## 2019-11-04 VITALS
SYSTOLIC BLOOD PRESSURE: 150 MMHG | DIASTOLIC BLOOD PRESSURE: 88 MMHG | BODY MASS INDEX: 30.75 KG/M2 | RESPIRATION RATE: 17 BRPM | HEIGHT: 72 IN | WEIGHT: 227 LBS | HEART RATE: 80 BPM

## 2019-11-04 DIAGNOSIS — M25.511 CHRONIC PAIN OF BOTH SHOULDERS: ICD-10-CM

## 2019-11-04 DIAGNOSIS — M75.121 COMPLETE TEAR OF RIGHT ROTATOR CUFF, UNSPECIFIED WHETHER TRAUMATIC: Primary | ICD-10-CM

## 2019-11-04 DIAGNOSIS — M25.512 CHRONIC PAIN OF BOTH SHOULDERS: ICD-10-CM

## 2019-11-04 DIAGNOSIS — G89.29 CHRONIC PAIN OF BOTH SHOULDERS: ICD-10-CM

## 2019-11-04 PROCEDURE — G8484 FLU IMMUNIZE NO ADMIN: HCPCS | Performed by: ORTHOPAEDIC SURGERY

## 2019-11-04 PROCEDURE — G8427 DOCREV CUR MEDS BY ELIG CLIN: HCPCS | Performed by: ORTHOPAEDIC SURGERY

## 2019-11-04 PROCEDURE — 99243 OFF/OP CNSLTJ NEW/EST LOW 30: CPT | Performed by: ORTHOPAEDIC SURGERY

## 2019-11-04 PROCEDURE — G8417 CALC BMI ABV UP PARAM F/U: HCPCS | Performed by: ORTHOPAEDIC SURGERY

## 2019-11-07 ENCOUNTER — ANESTHESIA EVENT (OUTPATIENT)
Dept: OPERATING ROOM | Age: 54
End: 2019-11-07
Payer: COMMERCIAL

## 2019-11-08 ENCOUNTER — HOSPITAL ENCOUNTER (OUTPATIENT)
Age: 54
Setting detail: OUTPATIENT SURGERY
Discharge: HOME | End: 2019-11-08
Attending: ORTHOPAEDIC SURGERY | Admitting: ORTHOPAEDIC SURGERY
Payer: COMMERCIAL

## 2019-11-08 ENCOUNTER — ANESTHESIA (OUTPATIENT)
Dept: OPERATING ROOM | Age: 54
End: 2019-11-08
Payer: COMMERCIAL

## 2019-11-08 VITALS
DIASTOLIC BLOOD PRESSURE: 58 MMHG | TEMPERATURE: 96.4 F | RESPIRATION RATE: 16 BRPM | OXYGEN SATURATION: 100 % | SYSTOLIC BLOOD PRESSURE: 110 MMHG

## 2019-11-08 VITALS
HEIGHT: 72 IN | SYSTOLIC BLOOD PRESSURE: 133 MMHG | HEART RATE: 69 BPM | RESPIRATION RATE: 16 BRPM | TEMPERATURE: 97.4 F | BODY MASS INDEX: 31 KG/M2 | WEIGHT: 228.84 LBS | DIASTOLIC BLOOD PRESSURE: 87 MMHG | OXYGEN SATURATION: 96 %

## 2019-11-08 DIAGNOSIS — M75.121 COMPLETE TEAR OF RIGHT ROTATOR CUFF, UNSPECIFIED WHETHER TRAUMATIC: Primary | ICD-10-CM

## 2019-11-08 PROCEDURE — 3600000004 HC SURGERY LEVEL 4 BASE: Performed by: ORTHOPAEDIC SURGERY

## 2019-11-08 PROCEDURE — 29827 SHO ARTHRS SRG RT8TR CUF RPR: CPT | Performed by: ORTHOPAEDIC SURGERY

## 2019-11-08 PROCEDURE — 2580000003 HC RX 258: Performed by: ANESTHESIOLOGY

## 2019-11-08 PROCEDURE — 3600000014 HC SURGERY LEVEL 4 ADDTL 15MIN: Performed by: ORTHOPAEDIC SURGERY

## 2019-11-08 PROCEDURE — 7100000000 HC PACU RECOVERY - FIRST 15 MIN: Performed by: ORTHOPAEDIC SURGERY

## 2019-11-08 PROCEDURE — L3809 WHFO W/O JOINTS PRE OTS: HCPCS | Performed by: ORTHOPAEDIC SURGERY

## 2019-11-08 PROCEDURE — 2720000010 HC SURG SUPPLY STERILE: Performed by: ORTHOPAEDIC SURGERY

## 2019-11-08 PROCEDURE — 2500000003 HC RX 250 WO HCPCS: Performed by: ANESTHESIOLOGY

## 2019-11-08 PROCEDURE — 3700000000 HC ANESTHESIA ATTENDED CARE: Performed by: ORTHOPAEDIC SURGERY

## 2019-11-08 PROCEDURE — 29826 SHO ARTHRS SRG DECOMPRESSION: CPT | Performed by: ORTHOPAEDIC SURGERY

## 2019-11-08 PROCEDURE — 6360000002 HC RX W HCPCS: Performed by: ORTHOPAEDIC SURGERY

## 2019-11-08 PROCEDURE — C1713 ANCHOR/SCREW BN/BN,TIS/BN: HCPCS | Performed by: ORTHOPAEDIC SURGERY

## 2019-11-08 PROCEDURE — 6360000002 HC RX W HCPCS: Performed by: NURSE ANESTHETIST, CERTIFIED REGISTERED

## 2019-11-08 PROCEDURE — 7100000011 HC PHASE II RECOVERY - ADDTL 15 MIN: Performed by: ORTHOPAEDIC SURGERY

## 2019-11-08 PROCEDURE — 2709999900 HC NON-CHARGEABLE SUPPLY: Performed by: ORTHOPAEDIC SURGERY

## 2019-11-08 PROCEDURE — 3700000001 HC ADD 15 MINUTES (ANESTHESIA): Performed by: ORTHOPAEDIC SURGERY

## 2019-11-08 PROCEDURE — 6360000002 HC RX W HCPCS: Performed by: ANESTHESIOLOGY

## 2019-11-08 PROCEDURE — 64415 NJX AA&/STRD BRCH PLXS IMG: CPT | Performed by: ANESTHESIOLOGY

## 2019-11-08 PROCEDURE — 7100000010 HC PHASE II RECOVERY - FIRST 15 MIN: Performed by: ORTHOPAEDIC SURGERY

## 2019-11-08 PROCEDURE — 2500000003 HC RX 250 WO HCPCS: Performed by: NURSE ANESTHETIST, CERTIFIED REGISTERED

## 2019-11-08 DEVICE — ANCHOR SUT L24.5MM DIA4.75MM BIOCOMPOSITE SELF PUNCHING: Type: IMPLANTABLE DEVICE | Site: SHOULDER | Status: FUNCTIONAL

## 2019-11-08 DEVICE — SYSTEM IMPL L24MM DIA5.5MM BIOCOMP INCL 2 SWIVELOCK SP FOR: Type: IMPLANTABLE DEVICE | Site: SHOULDER | Status: FUNCTIONAL

## 2019-11-08 RX ORDER — FENTANYL CITRATE 50 UG/ML
INJECTION, SOLUTION INTRAMUSCULAR; INTRAVENOUS PRN
Status: DISCONTINUED | OUTPATIENT
Start: 2019-11-08 | End: 2019-11-08 | Stop reason: SDUPTHER

## 2019-11-08 RX ORDER — MEPERIDINE HYDROCHLORIDE 25 MG/ML
12.5 INJECTION INTRAMUSCULAR; INTRAVENOUS; SUBCUTANEOUS EVERY 5 MIN PRN
Status: DISCONTINUED | OUTPATIENT
Start: 2019-11-08 | End: 2019-11-08 | Stop reason: HOSPADM

## 2019-11-08 RX ORDER — KETOROLAC TROMETHAMINE 30 MG/ML
INJECTION, SOLUTION INTRAMUSCULAR; INTRAVENOUS PRN
Status: DISCONTINUED | OUTPATIENT
Start: 2019-11-08 | End: 2019-11-08 | Stop reason: SDUPTHER

## 2019-11-08 RX ORDER — MIDAZOLAM HYDROCHLORIDE 1 MG/ML
INJECTION INTRAMUSCULAR; INTRAVENOUS PRN
Status: DISCONTINUED | OUTPATIENT
Start: 2019-11-08 | End: 2019-11-08 | Stop reason: SDUPTHER

## 2019-11-08 RX ORDER — ONDANSETRON 2 MG/ML
4 INJECTION INTRAMUSCULAR; INTRAVENOUS
Status: DISCONTINUED | OUTPATIENT
Start: 2019-11-08 | End: 2019-11-08 | Stop reason: HOSPADM

## 2019-11-08 RX ORDER — PROMETHAZINE HYDROCHLORIDE 25 MG/1
25 TABLET ORAL EVERY 6 HOURS PRN
Qty: 5 TABLET | Refills: 0 | Status: SHIPPED | OUTPATIENT
Start: 2019-11-08 | End: 2020-01-27

## 2019-11-08 RX ORDER — SODIUM CHLORIDE 0.9 % (FLUSH) 0.9 %
10 SYRINGE (ML) INJECTION EVERY 12 HOURS SCHEDULED
Status: DISCONTINUED | OUTPATIENT
Start: 2019-11-08 | End: 2019-11-08 | Stop reason: HOSPADM

## 2019-11-08 RX ORDER — SODIUM CHLORIDE 9 MG/ML
INJECTION, SOLUTION INTRAVENOUS CONTINUOUS
Status: DISCONTINUED | OUTPATIENT
Start: 2019-11-08 | End: 2019-11-08 | Stop reason: HOSPADM

## 2019-11-08 RX ORDER — OXYCODONE HYDROCHLORIDE AND ACETAMINOPHEN 5; 325 MG/1; MG/1
1 TABLET ORAL PRN
Status: DISCONTINUED | OUTPATIENT
Start: 2019-11-08 | End: 2019-11-08 | Stop reason: HOSPADM

## 2019-11-08 RX ORDER — LABETALOL HYDROCHLORIDE 5 MG/ML
5 INJECTION, SOLUTION INTRAVENOUS EVERY 10 MIN PRN
Status: DISCONTINUED | OUTPATIENT
Start: 2019-11-08 | End: 2019-11-08 | Stop reason: HOSPADM

## 2019-11-08 RX ORDER — LIDOCAINE HYDROCHLORIDE 20 MG/ML
INJECTION, SOLUTION EPIDURAL; INFILTRATION; INTRACAUDAL; PERINEURAL PRN
Status: DISCONTINUED | OUTPATIENT
Start: 2019-11-08 | End: 2019-11-08 | Stop reason: SDUPTHER

## 2019-11-08 RX ORDER — MORPHINE SULFATE 2 MG/ML
2 INJECTION, SOLUTION INTRAMUSCULAR; INTRAVENOUS EVERY 5 MIN PRN
Status: DISCONTINUED | OUTPATIENT
Start: 2019-11-08 | End: 2019-11-08 | Stop reason: HOSPADM

## 2019-11-08 RX ORDER — MIDAZOLAM HYDROCHLORIDE 1 MG/ML
INJECTION INTRAMUSCULAR; INTRAVENOUS
Status: COMPLETED
Start: 2019-11-08 | End: 2019-11-08

## 2019-11-08 RX ORDER — BUPIVACAINE HYDROCHLORIDE 5 MG/ML
INJECTION, SOLUTION EPIDURAL; INTRACAUDAL
Status: COMPLETED
Start: 2019-11-08 | End: 2019-11-08

## 2019-11-08 RX ORDER — SUCCINYLCHOLINE CHLORIDE 20 MG/ML
INJECTION INTRAMUSCULAR; INTRAVENOUS PRN
Status: DISCONTINUED | OUTPATIENT
Start: 2019-11-08 | End: 2019-11-08 | Stop reason: SDUPTHER

## 2019-11-08 RX ORDER — OXYCODONE HYDROCHLORIDE AND ACETAMINOPHEN 5; 325 MG/1; MG/1
2 TABLET ORAL PRN
Status: DISCONTINUED | OUTPATIENT
Start: 2019-11-08 | End: 2019-11-08 | Stop reason: HOSPADM

## 2019-11-08 RX ORDER — BUPIVACAINE HYDROCHLORIDE 5 MG/ML
INJECTION, SOLUTION EPIDURAL; INTRACAUDAL
Status: DISCONTINUED | OUTPATIENT
Start: 2019-11-08 | End: 2019-11-08 | Stop reason: SDUPTHER

## 2019-11-08 RX ORDER — PROPOFOL 10 MG/ML
INJECTION, EMULSION INTRAVENOUS PRN
Status: DISCONTINUED | OUTPATIENT
Start: 2019-11-08 | End: 2019-11-08 | Stop reason: SDUPTHER

## 2019-11-08 RX ORDER — LIDOCAINE HYDROCHLORIDE 20 MG/ML
INJECTION, SOLUTION EPIDURAL; INFILTRATION; INTRACAUDAL; PERINEURAL
Status: COMPLETED
Start: 2019-11-08 | End: 2019-11-08

## 2019-11-08 RX ORDER — OXYCODONE HYDROCHLORIDE AND ACETAMINOPHEN 5; 325 MG/1; MG/1
1 TABLET ORAL EVERY 4 HOURS PRN
Qty: 40 TABLET | Refills: 0 | Status: SHIPPED | OUTPATIENT
Start: 2019-11-08 | End: 2019-11-15

## 2019-11-08 RX ORDER — POVIDONE-IODINE 10 MG/G
OINTMENT TOPICAL
Status: COMPLETED | OUTPATIENT
Start: 2019-11-08 | End: 2019-11-08

## 2019-11-08 RX ORDER — DEXAMETHASONE SODIUM PHOSPHATE 4 MG/ML
INJECTION, SOLUTION INTRA-ARTICULAR; INTRALESIONAL; INTRAMUSCULAR; INTRAVENOUS; SOFT TISSUE PRN
Status: DISCONTINUED | OUTPATIENT
Start: 2019-11-08 | End: 2019-11-08 | Stop reason: SDUPTHER

## 2019-11-08 RX ORDER — MORPHINE SULFATE 2 MG/ML
1 INJECTION, SOLUTION INTRAMUSCULAR; INTRAVENOUS EVERY 5 MIN PRN
Status: DISCONTINUED | OUTPATIENT
Start: 2019-11-08 | End: 2019-11-08 | Stop reason: HOSPADM

## 2019-11-08 RX ORDER — SODIUM CHLORIDE 0.9 % (FLUSH) 0.9 %
10 SYRINGE (ML) INJECTION PRN
Status: DISCONTINUED | OUTPATIENT
Start: 2019-11-08 | End: 2019-11-08 | Stop reason: HOSPADM

## 2019-11-08 RX ORDER — HYDRALAZINE HYDROCHLORIDE 20 MG/ML
5 INJECTION INTRAMUSCULAR; INTRAVENOUS
Status: DISCONTINUED | OUTPATIENT
Start: 2019-11-08 | End: 2019-11-08 | Stop reason: HOSPADM

## 2019-11-08 RX ORDER — ONDANSETRON 2 MG/ML
INJECTION INTRAMUSCULAR; INTRAVENOUS PRN
Status: DISCONTINUED | OUTPATIENT
Start: 2019-11-08 | End: 2019-11-08 | Stop reason: SDUPTHER

## 2019-11-08 RX ADMIN — ONDANSETRON 4 MG: 2 INJECTION INTRAMUSCULAR; INTRAVENOUS at 07:41

## 2019-11-08 RX ADMIN — KETOROLAC TROMETHAMINE 30 MG: 30 INJECTION, SOLUTION INTRAMUSCULAR at 07:57

## 2019-11-08 RX ADMIN — Medication 2 G: at 07:20

## 2019-11-08 RX ADMIN — MIDAZOLAM 2 MG: 1 INJECTION INTRAMUSCULAR; INTRAVENOUS at 07:16

## 2019-11-08 RX ADMIN — MEPERIDINE HYDROCHLORIDE 12.5 MG: 25 INJECTION INTRAMUSCULAR; INTRAVENOUS; SUBCUTANEOUS at 09:10

## 2019-11-08 RX ADMIN — SODIUM CHLORIDE: 9 INJECTION, SOLUTION INTRAVENOUS at 06:41

## 2019-11-08 RX ADMIN — SODIUM CHLORIDE: 9 INJECTION, SOLUTION INTRAVENOUS at 07:12

## 2019-11-08 RX ADMIN — LIDOCAINE HYDROCHLORIDE 50 MG: 20 INJECTION, SOLUTION EPIDURAL; INFILTRATION; INTRACAUDAL; PERINEURAL at 07:22

## 2019-11-08 RX ADMIN — BUPIVACAINE HYDROCHLORIDE 30 ML: 5 INJECTION, SOLUTION EPIDURAL; INTRACAUDAL; PERINEURAL at 09:55

## 2019-11-08 RX ADMIN — SUCCINYLCHOLINE CHLORIDE 120 MG: 20 INJECTION, SOLUTION INTRAMUSCULAR; INTRAVENOUS at 07:22

## 2019-11-08 RX ADMIN — FENTANYL CITRATE 50 MCG: 50 INJECTION INTRAMUSCULAR; INTRAVENOUS at 07:35

## 2019-11-08 RX ADMIN — FENTANYL CITRATE 50 MCG: 50 INJECTION INTRAMUSCULAR; INTRAVENOUS at 07:22

## 2019-11-08 RX ADMIN — DEXAMETHASONE SODIUM PHOSPHATE 8 MG: 4 INJECTION, SOLUTION INTRAMUSCULAR; INTRAVENOUS at 07:41

## 2019-11-08 RX ADMIN — PROPOFOL 200 MG: 10 INJECTION, EMULSION INTRAVENOUS at 07:22

## 2019-11-08 ASSESSMENT — PULMONARY FUNCTION TESTS
PIF_VALUE: 14
PIF_VALUE: 16
PIF_VALUE: 13
PIF_VALUE: 18
PIF_VALUE: 14
PIF_VALUE: 31
PIF_VALUE: 3
PIF_VALUE: 18
PIF_VALUE: 19
PIF_VALUE: 13
PIF_VALUE: 18
PIF_VALUE: 18
PIF_VALUE: 19
PIF_VALUE: 0
PIF_VALUE: 14
PIF_VALUE: 18
PIF_VALUE: 12
PIF_VALUE: 14
PIF_VALUE: 18
PIF_VALUE: 19
PIF_VALUE: 18
PIF_VALUE: 16
PIF_VALUE: 18
PIF_VALUE: 18
PIF_VALUE: 13
PIF_VALUE: 18
PIF_VALUE: 18
PIF_VALUE: 48
PIF_VALUE: 18
PIF_VALUE: 18
PIF_VALUE: 0
PIF_VALUE: 14
PIF_VALUE: 18
PIF_VALUE: 14
PIF_VALUE: 18
PIF_VALUE: 18
PIF_VALUE: 16
PIF_VALUE: 18
PIF_VALUE: 19
PIF_VALUE: 17
PIF_VALUE: 18
PIF_VALUE: 15
PIF_VALUE: 19
PIF_VALUE: 14
PIF_VALUE: 18
PIF_VALUE: 1
PIF_VALUE: 18
PIF_VALUE: 13
PIF_VALUE: 18
PIF_VALUE: 15
PIF_VALUE: 18
PIF_VALUE: 25
PIF_VALUE: 18
PIF_VALUE: 14
PIF_VALUE: 18
PIF_VALUE: 19
PIF_VALUE: 18
PIF_VALUE: 21

## 2019-11-08 ASSESSMENT — ENCOUNTER SYMPTOMS: SHORTNESS OF BREATH: 0

## 2019-11-08 ASSESSMENT — PAIN DESCRIPTION - DESCRIPTORS: DESCRIPTORS: ACHING

## 2019-11-08 ASSESSMENT — PAIN SCALES - GENERAL
PAINLEVEL_OUTOF10: 0

## 2019-11-08 ASSESSMENT — PAIN - FUNCTIONAL ASSESSMENT
PAIN_FUNCTIONAL_ASSESSMENT: PREVENTS OR INTERFERES SOME ACTIVE ACTIVITIES AND ADLS
PAIN_FUNCTIONAL_ASSESSMENT: 0-10

## 2019-11-12 ENCOUNTER — OFFICE VISIT (OUTPATIENT)
Dept: ORTHOPEDIC SURGERY | Age: 54
End: 2019-11-12
Payer: COMMERCIAL

## 2019-11-12 VITALS — BODY MASS INDEX: 31 KG/M2 | HEIGHT: 72 IN | WEIGHT: 228.84 LBS | RESPIRATION RATE: 17 BRPM | HEART RATE: 82 BPM

## 2019-11-12 DIAGNOSIS — M75.121 COMPLETE TEAR OF RIGHT ROTATOR CUFF, UNSPECIFIED WHETHER TRAUMATIC: Primary | ICD-10-CM

## 2019-11-12 PROCEDURE — L3670 SO ACRO/CLAV CAN WEB PRE OTS: HCPCS | Performed by: ORTHOPAEDIC SURGERY

## 2019-11-12 PROCEDURE — 99024 POSTOP FOLLOW-UP VISIT: CPT | Performed by: ORTHOPAEDIC SURGERY

## 2019-11-18 ENCOUNTER — TELEPHONE (OUTPATIENT)
Dept: ORTHOPEDIC SURGERY | Age: 54
End: 2019-11-18

## 2019-11-18 DIAGNOSIS — M75.121 COMPLETE TEAR OF RIGHT ROTATOR CUFF, UNSPECIFIED WHETHER TRAUMATIC: Primary | ICD-10-CM

## 2019-11-18 RX ORDER — OXYCODONE HYDROCHLORIDE AND ACETAMINOPHEN 5; 325 MG/1; MG/1
1 TABLET ORAL EVERY 8 HOURS PRN
Qty: 21 TABLET | Refills: 0 | Status: SHIPPED | OUTPATIENT
Start: 2019-11-18 | End: 2019-11-25

## 2019-11-25 ENCOUNTER — TELEPHONE (OUTPATIENT)
Dept: ORTHOPEDIC SURGERY | Age: 54
End: 2019-11-25

## 2019-11-26 ENCOUNTER — OFFICE VISIT (OUTPATIENT)
Dept: ORTHOPEDIC SURGERY | Age: 54
End: 2019-11-26

## 2019-11-26 VITALS — HEIGHT: 72 IN | RESPIRATION RATE: 18 BRPM | BODY MASS INDEX: 31 KG/M2 | WEIGHT: 228.84 LBS

## 2019-11-26 DIAGNOSIS — M75.121 COMPLETE TEAR OF RIGHT ROTATOR CUFF, UNSPECIFIED WHETHER TRAUMATIC: Primary | ICD-10-CM

## 2019-11-26 PROCEDURE — 99024 POSTOP FOLLOW-UP VISIT: CPT | Performed by: ORTHOPAEDIC SURGERY

## 2019-11-26 RX ORDER — OXYCODONE HYDROCHLORIDE AND ACETAMINOPHEN 5; 325 MG/1; MG/1
1 TABLET ORAL 2 TIMES DAILY PRN
Qty: 14 TABLET | Refills: 0 | Status: SHIPPED | OUTPATIENT
Start: 2019-11-26 | End: 2019-12-03

## 2019-12-01 ENCOUNTER — HOSPITAL ENCOUNTER (EMERGENCY)
Age: 54
Discharge: HOME | End: 2019-12-01
Attending: EMERGENCY MEDICINE
Payer: COMMERCIAL

## 2019-12-01 ENCOUNTER — APPOINTMENT (OUTPATIENT)
Dept: GENERAL RADIOLOGY | Age: 54
End: 2019-12-01
Payer: COMMERCIAL

## 2019-12-01 VITALS
DIASTOLIC BLOOD PRESSURE: 90 MMHG | SYSTOLIC BLOOD PRESSURE: 161 MMHG | WEIGHT: 225 LBS | OXYGEN SATURATION: 100 % | HEIGHT: 72 IN | HEART RATE: 94 BPM | BODY MASS INDEX: 30.48 KG/M2 | RESPIRATION RATE: 16 BRPM | TEMPERATURE: 98.3 F

## 2019-12-01 DIAGNOSIS — S46.911A STRAIN OF RIGHT SHOULDER, INITIAL ENCOUNTER: Primary | ICD-10-CM

## 2019-12-01 PROCEDURE — 99283 EMERGENCY DEPT VISIT LOW MDM: CPT

## 2019-12-01 PROCEDURE — 6370000000 HC RX 637 (ALT 250 FOR IP): Performed by: EMERGENCY MEDICINE

## 2019-12-01 PROCEDURE — 73030 X-RAY EXAM OF SHOULDER: CPT

## 2019-12-01 RX ORDER — IBUPROFEN 600 MG/1
600 TABLET ORAL EVERY 6 HOURS PRN
Qty: 120 TABLET | Refills: 0 | Status: ON HOLD | OUTPATIENT
Start: 2019-12-01 | End: 2020-05-02 | Stop reason: HOSPADM

## 2019-12-01 RX ORDER — IBUPROFEN 400 MG/1
400 TABLET ORAL ONCE
Status: DISCONTINUED | OUTPATIENT
Start: 2019-12-01 | End: 2019-12-01 | Stop reason: HOSPADM

## 2019-12-01 RX ORDER — ACETAMINOPHEN 325 MG/1
650 TABLET ORAL ONCE
Status: COMPLETED | OUTPATIENT
Start: 2019-12-01 | End: 2019-12-01

## 2019-12-01 RX ADMIN — ACETAMINOPHEN 650 MG: 325 TABLET ORAL at 05:34

## 2019-12-01 ASSESSMENT — PAIN DESCRIPTION - DESCRIPTORS
DESCRIPTORS: ACHING;THROBBING
DESCRIPTORS: THROBBING;ACHING

## 2019-12-01 ASSESSMENT — PAIN SCALES - GENERAL
PAINLEVEL_OUTOF10: 8

## 2019-12-01 ASSESSMENT — PAIN - FUNCTIONAL ASSESSMENT
PAIN_FUNCTIONAL_ASSESSMENT: 0-10
PAIN_FUNCTIONAL_ASSESSMENT: PREVENTS OR INTERFERES SOME ACTIVE ACTIVITIES AND ADLS
PAIN_FUNCTIONAL_ASSESSMENT: ACTIVITIES ARE NOT PREVENTED

## 2019-12-01 ASSESSMENT — PAIN DESCRIPTION - PROGRESSION
CLINICAL_PROGRESSION: NOT CHANGED
CLINICAL_PROGRESSION: NOT CHANGED

## 2019-12-01 ASSESSMENT — PAIN DESCRIPTION - LOCATION
LOCATION: SHOULDER
LOCATION: SHOULDER

## 2019-12-01 ASSESSMENT — PAIN DESCRIPTION - FREQUENCY
FREQUENCY: CONTINUOUS
FREQUENCY: CONTINUOUS

## 2019-12-01 ASSESSMENT — PAIN DESCRIPTION - PAIN TYPE
TYPE: ACUTE PAIN
TYPE: ACUTE PAIN

## 2019-12-01 ASSESSMENT — PAIN DESCRIPTION - ORIENTATION
ORIENTATION: RIGHT
ORIENTATION: RIGHT

## 2019-12-04 ENCOUNTER — TELEPHONE (OUTPATIENT)
Dept: ORTHOPEDIC SURGERY | Age: 54
End: 2019-12-04

## 2019-12-04 DIAGNOSIS — M75.121 COMPLETE TEAR OF RIGHT ROTATOR CUFF, UNSPECIFIED WHETHER TRAUMATIC: Primary | ICD-10-CM

## 2019-12-05 RX ORDER — OXYCODONE HYDROCHLORIDE AND ACETAMINOPHEN 5; 325 MG/1; MG/1
1 TABLET ORAL 2 TIMES DAILY PRN
Qty: 14 TABLET | Refills: 0 | Status: SHIPPED | OUTPATIENT
Start: 2019-12-05 | End: 2019-12-12

## 2019-12-09 ENCOUNTER — HOSPITAL ENCOUNTER (OUTPATIENT)
Dept: PHYSICAL THERAPY | Age: 54
Setting detail: THERAPIES SERIES
Discharge: HOME | End: 2019-12-09
Payer: COMMERCIAL

## 2019-12-09 PROCEDURE — 97110 THERAPEUTIC EXERCISES: CPT

## 2019-12-09 PROCEDURE — G0283 ELEC STIM OTHER THAN WOUND: HCPCS

## 2019-12-09 PROCEDURE — 97162 PT EVAL MOD COMPLEX 30 MIN: CPT

## 2019-12-09 ASSESSMENT — PAIN DESCRIPTION - DESCRIPTORS: DESCRIPTORS: BURNING;THROBBING;CONSTANT

## 2019-12-09 ASSESSMENT — PAIN DESCRIPTION - LOCATION: LOCATION: SHOULDER

## 2019-12-09 ASSESSMENT — PAIN SCALES - GENERAL: PAINLEVEL_OUTOF10: 8

## 2019-12-09 ASSESSMENT — PAIN DESCRIPTION - PAIN TYPE: TYPE: SURGICAL PAIN

## 2019-12-09 ASSESSMENT — PAIN DESCRIPTION - ONSET: ONSET: GRADUAL

## 2019-12-09 ASSESSMENT — PAIN DESCRIPTION - ORIENTATION: ORIENTATION: RIGHT

## 2019-12-09 ASSESSMENT — PAIN - FUNCTIONAL ASSESSMENT: PAIN_FUNCTIONAL_ASSESSMENT: PREVENTS OR INTERFERES WITH ALL ACTIVE AND SOME PASSIVE ACTIVITIES

## 2019-12-09 ASSESSMENT — PAIN DESCRIPTION - FREQUENCY: FREQUENCY: CONTINUOUS

## 2019-12-09 ASSESSMENT — PAIN DESCRIPTION - PROGRESSION: CLINICAL_PROGRESSION: NOT CHANGED

## 2019-12-10 ENCOUNTER — TELEPHONE (OUTPATIENT)
Dept: ORTHOPEDIC SURGERY | Age: 54
End: 2019-12-10

## 2019-12-10 DIAGNOSIS — M75.121 COMPLETE TEAR OF RIGHT ROTATOR CUFF, UNSPECIFIED WHETHER TRAUMATIC: Primary | ICD-10-CM

## 2019-12-10 RX ORDER — OXYCODONE HYDROCHLORIDE AND ACETAMINOPHEN 5; 325 MG/1; MG/1
1 TABLET ORAL 2 TIMES DAILY PRN
Qty: 14 TABLET | Refills: 0 | Status: SHIPPED | OUTPATIENT
Start: 2019-12-10 | End: 2019-12-17

## 2019-12-17 ENCOUNTER — HOSPITAL ENCOUNTER (OUTPATIENT)
Dept: PHYSICAL THERAPY | Age: 54
Setting detail: THERAPIES SERIES
Discharge: HOME | End: 2019-12-17
Payer: COMMERCIAL

## 2019-12-18 ENCOUNTER — TELEPHONE (OUTPATIENT)
Dept: ORTHOPEDIC SURGERY | Age: 54
End: 2019-12-18

## 2019-12-18 DIAGNOSIS — M75.121 COMPLETE TEAR OF RIGHT ROTATOR CUFF, UNSPECIFIED WHETHER TRAUMATIC: Primary | ICD-10-CM

## 2019-12-19 RX ORDER — OXYCODONE HYDROCHLORIDE AND ACETAMINOPHEN 5; 325 MG/1; MG/1
1 TABLET ORAL 2 TIMES DAILY PRN
Qty: 14 TABLET | Refills: 0 | Status: SHIPPED | OUTPATIENT
Start: 2019-12-19 | End: 2019-12-26

## 2019-12-26 ENCOUNTER — APPOINTMENT (OUTPATIENT)
Dept: PHYSICAL THERAPY | Age: 54
End: 2019-12-26
Payer: COMMERCIAL

## 2019-12-27 ENCOUNTER — TELEPHONE (OUTPATIENT)
Dept: ORTHOPEDIC SURGERY | Age: 54
End: 2019-12-27

## 2019-12-30 ENCOUNTER — APPOINTMENT (OUTPATIENT)
Dept: PHYSICAL THERAPY | Age: 54
End: 2019-12-30
Payer: COMMERCIAL

## 2019-12-30 ENCOUNTER — TELEPHONE (OUTPATIENT)
Dept: ORTHOPEDIC SURGERY | Age: 54
End: 2019-12-30

## 2019-12-31 ENCOUNTER — TELEPHONE (OUTPATIENT)
Dept: ORTHOPEDIC SURGERY | Age: 54
End: 2019-12-31

## 2020-01-01 ENCOUNTER — HOSPITAL ENCOUNTER (EMERGENCY)
Age: 55
Discharge: HOME | End: 2020-01-01
Payer: COMMERCIAL

## 2020-01-01 VITALS
DIASTOLIC BLOOD PRESSURE: 78 MMHG | RESPIRATION RATE: 17 BRPM | TEMPERATURE: 98.1 F | BODY MASS INDEX: 33.38 KG/M2 | SYSTOLIC BLOOD PRESSURE: 148 MMHG | WEIGHT: 246.47 LBS | OXYGEN SATURATION: 99 % | HEART RATE: 88 BPM | HEIGHT: 72 IN

## 2020-01-01 PROCEDURE — 6370000000 HC RX 637 (ALT 250 FOR IP): Performed by: NURSE PRACTITIONER

## 2020-01-01 PROCEDURE — 99283 EMERGENCY DEPT VISIT LOW MDM: CPT

## 2020-01-01 RX ORDER — IBUPROFEN 400 MG/1
800 TABLET ORAL ONCE
Status: COMPLETED | OUTPATIENT
Start: 2020-01-01 | End: 2020-01-01

## 2020-01-01 RX ORDER — OXYCODONE HYDROCHLORIDE AND ACETAMINOPHEN 5; 325 MG/1; MG/1
2 TABLET ORAL ONCE
Status: COMPLETED | OUTPATIENT
Start: 2020-01-01 | End: 2020-01-01

## 2020-01-01 RX ORDER — CYCLOBENZAPRINE HCL 10 MG
10 TABLET ORAL ONCE
Status: COMPLETED | OUTPATIENT
Start: 2020-01-01 | End: 2020-01-01

## 2020-01-01 RX ADMIN — IBUPROFEN 800 MG: 400 TABLET, FILM COATED ORAL at 23:28

## 2020-01-01 RX ADMIN — OXYCODONE HYDROCHLORIDE AND ACETAMINOPHEN 2 TABLET: 5; 325 TABLET ORAL at 23:28

## 2020-01-01 RX ADMIN — CYCLOBENZAPRINE HYDROCHLORIDE 10 MG: 10 TABLET, FILM COATED ORAL at 23:28

## 2020-01-01 ASSESSMENT — PAIN DESCRIPTION - PAIN TYPE
TYPE: ACUTE PAIN
TYPE: ACUTE PAIN

## 2020-01-01 ASSESSMENT — PAIN - FUNCTIONAL ASSESSMENT
PAIN_FUNCTIONAL_ASSESSMENT: 0-10
PAIN_FUNCTIONAL_ASSESSMENT: PREVENTS OR INTERFERES SOME ACTIVE ACTIVITIES AND ADLS
PAIN_FUNCTIONAL_ASSESSMENT: PREVENTS OR INTERFERES WITH MANY ACTIVE NOT PASSIVE ACTIVITIES

## 2020-01-01 ASSESSMENT — PAIN DESCRIPTION - DESCRIPTORS
DESCRIPTORS: ACHING;TIGHTNESS
DESCRIPTORS: THROBBING;SHARP

## 2020-01-01 ASSESSMENT — PAIN DESCRIPTION - ORIENTATION
ORIENTATION: RIGHT
ORIENTATION: RIGHT

## 2020-01-01 ASSESSMENT — PAIN DESCRIPTION - ONSET
ONSET: PROGRESSIVE
ONSET: AWAKENED FROM SLEEP

## 2020-01-01 ASSESSMENT — PAIN SCALES - GENERAL
PAINLEVEL_OUTOF10: 8
PAINLEVEL_OUTOF10: 9
PAINLEVEL_OUTOF10: 10

## 2020-01-01 ASSESSMENT — PAIN DESCRIPTION - FREQUENCY
FREQUENCY: CONTINUOUS
FREQUENCY: CONTINUOUS

## 2020-01-01 ASSESSMENT — PAIN DESCRIPTION - PROGRESSION
CLINICAL_PROGRESSION: GRADUALLY WORSENING
CLINICAL_PROGRESSION: GRADUALLY WORSENING

## 2020-01-01 ASSESSMENT — PAIN DESCRIPTION - LOCATION
LOCATION: SHOULDER
LOCATION: SHOULDER

## 2020-01-02 ENCOUNTER — OFFICE VISIT (OUTPATIENT)
Dept: ORTHOPEDIC SURGERY | Age: 55
End: 2020-01-02

## 2020-01-02 VITALS — WEIGHT: 246.47 LBS | HEIGHT: 72 IN | RESPIRATION RATE: 19 BRPM | BODY MASS INDEX: 33.38 KG/M2 | HEART RATE: 84 BPM

## 2020-01-02 PROCEDURE — 99024 POSTOP FOLLOW-UP VISIT: CPT | Performed by: ORTHOPAEDIC SURGERY

## 2020-01-02 RX ORDER — TRAMADOL HYDROCHLORIDE 50 MG/1
50 TABLET ORAL 2 TIMES DAILY PRN
Qty: 14 TABLET | Refills: 0 | Status: SHIPPED | OUTPATIENT
Start: 2020-01-02 | End: 2020-01-09

## 2020-01-02 NOTE — ED PROVIDER NOTES
[address]  [address] [address]  Dept: [phone]  Loc: [address] ENCOUNTER        This patient was not seen or evaluated by the attending physician. I evaluated this patient, the attending physician was available for consultation. CHIEF COMPLAINT    Chief Complaint   Patient presents with    Post-op Problem     patient states recent right shoulder surg 4 weeks ago. 3 days ago, patient concerned he may have reinjured shoulder catching girlfriend falling off a ladder. to ED for eval       HPI    Oral D Artur Mcdonald is a 48 y.o. male who is 8 weeks s/p right rotator cuff repair by Dr. Hemanth Jean who presents to the emergency department today complaining of right shoulder pain. Patient states he has been doing very well postop until 3 days ago when he attempted to catch his girlfriend who was falling off a step stool. He is back in his sling. He rates the pain 10/10. He has an appointment with his orthopedic surgeon in the morning.       REVIEW OF SYSTEMS    Musculoskeletal: see HPI, no other joint or bony injury  Cardiac: No chest pain  Pulmonary: No difficulty breathing  Skin: No lacerations or puncture wounds  Neurologic: No loss of consciousness, no head injury, no paresthesias or focal distal extremity weakness    PAST MEDICAL & SURGICAL HISTORY    Past Medical History:   Diagnosis Date    ADHD     Arthritis     Bipolar 1 disorder ([address]     Depression      Past Surgical History:   Procedure Laterality Date    FRACTURE SURGERY Right     FX Tibia / Fibula    LEG SURGERY Right     Remove Plate    SHOULDER ARTHROSCOPY Right 11/8/2019    RIGHT SHOULDER ARTHROSCOPY,  ROTATOR CUFF REPAIR, SUBACROMIC DECOMPRESSION, LABRIAL DEBRIDEMENT performed by Annette Knapp MD at [facility]      x 4       CURRENT MEDICATIONS  (may include discharge medications prescribed in the ED)  Current Minutes per session: Not on file    Stress: Not on file   Relationships    Social connections:     Talks on phone: Not on file     Gets together: Not on file     Attends Hoahaoism service: Not on file     Active member of club or organization: Not on file     Attends meetings of clubs or organizations: Not on file     Relationship status: Not on file    Intimate partner violence:     Fear of current or ex partner: Not on file     Emotionally abused: Not on file     Physically abused: Not on file     Forced sexual activity: Not on file   Other Topics Concern    Not on file   Social History Narrative    Not on file     No family history on file. PHYSICAL EXAM    VITAL SIGNS: BP (!) 157/95   Pulse 90   Temp 98.1 °F (36.7 °C) (Oral)   Resp 18   Ht 6' (1.829 m)   Wt 246 lb 7.6 oz (111.8 kg)   SpO2 97%   BMI 33.43 kg/m²   Constitutional:  Well nourished, no acute distress  HENT:  Atraumatic, moist mucous membranes  NECK: normal range of motion/supple, no posterior midline neck tenderness  Respiratory: Lungs clear to auscultation bilaterally, no retractions  Cardiovascular:  Regular rate, no JVD  Vascular:  Right radial pulse 2+ on the affected side  Musculoskeletal:  Exam of the affected shoulder reveals mild tenderness to palpation, no obvious deformity, passive and active range of motion intact. No increased warmth or joint effusion    Integument:  Well hydrated, no skin lacerations   Neurologic:  Awake, alert, no slurred speech, axillary nerve is intact and the median/ulnar/radial nerve sensory and motor distributions are intact on the affected side      RADIOLOGY   No orders to display       PROCEDURES  none    ED COURSE & MEDICAL DECISION MAKING   See chart for medications given during emergency department course.     Differential diagnosis: includes but not limited to rotator cuff sprain/tear/rupture, clavicle fracture, humerus fracture, scapular fracture, posterior or anterior glenohumeral joint

## 2020-01-02 NOTE — PROGRESS NOTES
Shoulder Arthroscopy Follow-up  Yonas Mcdonald is here for follow up after right shoulder arthroscopic surgery. Surgery date was 11/8/19. Findings at surgery: large rotator cuff tear, labral fraying. Pain is controlled with current analgesics. Medication(s) being used: Percocet. The patient's pain is rated at 8/10. He has not been back to PT. He states his car was broken down. He has been on large amount of pain medication, requesting refill exactly every 7 days. He was able to fill all narcotic prescriptions per prescription monitoring report. Extensive documentation in chart from telephone messages. He states that his significant other was falling off bottom step of a ladder on 12/30/19 and he reached out to grab her. He was not wearing sling at the time. He states pain is located lateral.        Physical Examination:  Pulse 84   Resp 19   Ht 6' 0.01\" (1.829 m)   Wt 246 lb 7.6 oz (111.8 kg)   BMI 33.42 kg/m²     Patient is awake, alert, and in no acute distress. He was argumentative today. The incisions healing. Sensation is intact to light touch in the axillary, median, radial, and ulnar nerve distribution bilaterally. The EPL, FPL, and interossei are grossly intact bilaterally. The Bilateral upper extremities are warm and well-perfused with brisk capillary refill. Assessment:   1.  7 weeks status post right shoulder arthroscopy, subacromial decompression, rotator cuff repair, labral debridement  2  Noncompliance  3. Possible drug-seeking behavior      Plan:   He needs to go to PT. I do not see any appointments scheduled. He was discharged for attendance policy previously. He claims he called PT and told them his car was broken down     Continue sling for now since reported new injury. MRI of right shoulder to evaluate if he disrupted repair. I will not continue to prescribe him narcotic pain medications as previously documented in telephone notes.   A prescription monitoring report was reviewed for the patient. He was given a prescription for Ultram today. No NSAIDs. I do have concerns about his behavior, whether it is from drug-seeking vs personality disorder. On two different days, he called telephone room repeatedly, even though it was explained to him that MA was in the middle of clinic and would return his call when she was free. He never picked up when calls were returned. He states today that he did not receive any of the messages that my MA left. Follow up after MRI.

## 2020-01-05 ENCOUNTER — HOSPITAL ENCOUNTER (EMERGENCY)
Age: 55
Discharge: HOME | End: 2020-01-06
Payer: COMMERCIAL

## 2020-01-05 VITALS
HEIGHT: 72 IN | RESPIRATION RATE: 20 BRPM | BODY MASS INDEX: 33.23 KG/M2 | SYSTOLIC BLOOD PRESSURE: 178 MMHG | WEIGHT: 245.37 LBS | TEMPERATURE: 97.2 F | OXYGEN SATURATION: 96 % | DIASTOLIC BLOOD PRESSURE: 94 MMHG | HEART RATE: 76 BPM

## 2020-01-05 PROCEDURE — 99282 EMERGENCY DEPT VISIT SF MDM: CPT

## 2020-01-05 ASSESSMENT — PAIN DESCRIPTION - DESCRIPTORS: DESCRIPTORS: ACHING;THROBBING

## 2020-01-05 ASSESSMENT — PAIN - FUNCTIONAL ASSESSMENT: PAIN_FUNCTIONAL_ASSESSMENT: PREVENTS OR INTERFERES SOME ACTIVE ACTIVITIES AND ADLS

## 2020-01-05 ASSESSMENT — PAIN SCALES - GENERAL: PAINLEVEL_OUTOF10: 7

## 2020-01-05 ASSESSMENT — PAIN DESCRIPTION - ONSET: ONSET: ON-GOING

## 2020-01-05 ASSESSMENT — PAIN DESCRIPTION - FREQUENCY: FREQUENCY: CONTINUOUS

## 2020-01-05 ASSESSMENT — PAIN DESCRIPTION - LOCATION: LOCATION: SHOULDER

## 2020-01-05 ASSESSMENT — PAIN DESCRIPTION - ORIENTATION: ORIENTATION: RIGHT

## 2020-01-05 ASSESSMENT — PAIN DESCRIPTION - PAIN TYPE: TYPE: ACUTE PAIN

## 2020-01-05 ASSESSMENT — PAIN DESCRIPTION - PROGRESSION: CLINICAL_PROGRESSION: NOT CHANGED

## 2020-01-06 ENCOUNTER — TELEPHONE (OUTPATIENT)
Dept: ORTHOPEDIC SURGERY | Age: 55
End: 2020-01-06

## 2020-01-06 PROCEDURE — 6370000000 HC RX 637 (ALT 250 FOR IP): Performed by: PHYSICIAN ASSISTANT

## 2020-01-06 RX ORDER — HYDROCODONE BITARTRATE AND ACETAMINOPHEN 5; 325 MG/1; MG/1
1 TABLET ORAL ONCE
Status: COMPLETED | OUTPATIENT
Start: 2020-01-06 | End: 2020-01-06

## 2020-01-06 RX ADMIN — HYDROCODONE BITARTRATE AND ACETAMINOPHEN 1 TABLET: 5; 325 TABLET ORAL at 00:59

## 2020-01-06 ASSESSMENT — PAIN SCALES - GENERAL: PAINLEVEL_OUTOF10: 5

## 2020-01-06 ASSESSMENT — ENCOUNTER SYMPTOMS: COLOR CHANGE: 0

## 2020-01-06 NOTE — ED PROVIDER NOTES
release tablet TK 1 T PO QD, R-3Historical Med      albuterol sulfate HFA (PROVENTIL HFA) 108 (90 Base) MCG/ACT inhaler Inhale 2 puffs into the lungs every 4 hours as needed for Wheezing or Shortness of Breath (Space out to every 6 hours as symptoms improve) Space out to every 6 hours as symptoms improve., Disp-1 Inhaler, R-0Print             ALLERGIES     Reglan [metoclopramide]    FAMILY HISTORY     History reviewed. No pertinent family history. No family status information on file. SOCIAL HISTORY      reports that he has been smoking cigarettes. He has been smoking about 1.00 pack per day. He has never used smokeless tobacco. He reports current alcohol use. He reports that he does not use drugs. PHYSICAL EXAM    (up to 7 for level 4, 8 or more for level 5)     ED Triage Vitals [01/05/20 2057]   BP Temp Temp Source Pulse Resp SpO2 Height Weight   (!) 178/94 97.2 °F (36.2 °C) Oral 76 20 96 % 6' (1.829 m) 245 lb 6 oz (111.3 kg)     Physical Exam  Vitals signs and nursing note reviewed. Constitutional:       Appearance: Normal appearance. HENT:      Head: Normocephalic and atraumatic. Eyes:      Pupils: Pupils are equal, round, and reactive to light. Neck:      Musculoskeletal: Normal range of motion. Cardiovascular:      Rate and Rhythm: Normal rate. Pulses: Normal pulses. Musculoskeletal:      Right shoulder: He exhibits tenderness and pain. He exhibits normal pulse. Arms:    Skin:     General: Skin is warm. Neurological:      General: No focal deficit present. Mental Status: He is alert and oriented to person, place, and time. Psychiatric:         Mood and Affect: Mood normal.         Behavior: Behavior normal.         DIAGNOSTIC RESULTS     NONE    LABS:  Labs Reviewed - No data to display    All other labs were within normal range or not returned as of this dictation.     EMERGENCY DEPARTMENT COURSE and DIFFERENTIAL DIAGNOSIS/MDM:   Vitals:    Vitals:    01/05/20 2057

## 2020-01-06 NOTE — ED NOTES
Pt states that the Tramadol is not good enough, wants stronger pain meds.       Mortimer Massed, RN  01/06/20 0001

## 2020-01-06 NOTE — TELEPHONE ENCOUNTER
Called patient. Relayed the first part of Dr Courtney Nj message. The patient was agitated as he kept repeating he is having so much pain and he hasn't slept. I told him that I am very sorry he is having so much pain but he has to be patient and wait on having the MRI done. He stated that he has been to the ER several times and no one is helping him. I explained that he has a MRI scheduled and until then he needs to remain CALM, rest, ice, and alternate with Tylenol and Ultram.  Asked that he please be respectful of everyone trying to help him. Asked that he please not yell, or hang up on staff. That we are only here to help him and to be respectful as he would want that in return. Stressed again that he needs to rest and do nothing until he obtains the MRI and follows up in office.

## 2020-01-10 ENCOUNTER — TELEPHONE (OUTPATIENT)
Dept: ORTHOPEDIC SURGERY | Age: 55
End: 2020-01-10

## 2020-01-10 RX ORDER — TRAMADOL HYDROCHLORIDE 50 MG/1
50 TABLET ORAL EVERY 6 HOURS PRN
Qty: 40 TABLET | Refills: 0 | Status: SHIPPED | OUTPATIENT
Start: 2020-01-10 | End: 2020-01-17

## 2020-01-10 RX ORDER — DIAZEPAM 5 MG/1
10 TABLET ORAL ONCE
Qty: 2 TABLET | Refills: 0 | Status: SHIPPED | OUTPATIENT
Start: 2020-01-10 | End: 2020-01-10

## 2020-01-10 RX ORDER — TRAMADOL HYDROCHLORIDE 50 MG/1
50 TABLET ORAL 2 TIMES DAILY PRN
Qty: 14 TABLET | Refills: 0 | Status: SHIPPED | OUTPATIENT
Start: 2020-01-10 | End: 2020-01-10

## 2020-01-10 NOTE — TELEPHONE ENCOUNTER
Patient called to request a prescription to help him complete his MRI. He is also requesting a refill of pain medication.     Pharmacy:  [facility]: [phone]    Please call patient:  [phone]

## 2020-01-10 NOTE — TELEPHONE ENCOUNTER
Pt calling req percocet Rx. Pt states he is increasing pain and taking tramadol for it and meds are not giving any relief.  Lov 1/2/20 r ldr    Call [phone]    [facility] [phone]

## 2020-01-13 NOTE — TELEPHONE ENCOUNTER
He will not be given prescription for Percocet refill. Reasoning for this has been extensively documented previously. Again, I have significant concern for drug-seeking behavior. He was just given refill for Ultram on 1/10/20 and script for Valium for MRI. Unsure if this was relayed to him on afternoon of 1/10/20.

## 2020-01-13 NOTE — TELEPHONE ENCOUNTER
Pt is calling about getting something else for pain he said the tramadol is not helping and MRI was cancelled because insurance. He said he is not sleeping he just said 5 or 7 pills until the MRI is done.   He doesn't want to have to go to the ER

## 2020-01-13 NOTE — TELEPHONE ENCOUNTER
Called patient. LM that per Dr Giancarlo Allen, he will not give him Percocet. Suggested to continue Ultram, alternating Tylenol and ice as needed. Suggested calling Central scheduling regarding MRI denial.  LM with Central Scheduling's number.

## 2020-01-14 ENCOUNTER — TELEPHONE (OUTPATIENT)
Dept: ADMINISTRATIVE | Age: 55
End: 2020-01-14

## 2020-01-14 ENCOUNTER — TELEPHONE (OUTPATIENT)
Dept: ORTHOPEDIC SURGERY | Age: 55
End: 2020-01-14

## 2020-01-14 DIAGNOSIS — M75.121 COMPLETE TEAR OF RIGHT ROTATOR CUFF, UNSPECIFIED WHETHER TRAUMATIC: Primary | ICD-10-CM

## 2020-01-14 RX ORDER — DIAZEPAM 5 MG/1
10 TABLET ORAL ONCE
Qty: 2 TABLET | Refills: 0 | Status: SHIPPED | OUTPATIENT
Start: 2020-01-14 | End: 2020-01-14

## 2020-01-14 NOTE — TELEPHONE ENCOUNTER
Called patient back again. Informed him to call Central Scheduling to get MRI scheduled. Explained that Dr Griffin Willson did do the peer to peer to get approval from his insurance. Asked if he need the number again. He stated that he has it from me from my messages yesterday. He asked again for pain meds. I informed him that Dr Griffin Willson cannot give him any narcotics at this time due to the [address] [place] [address]. Suggested again rest, ice and Ultram that was given to him.

## 2020-01-15 ENCOUNTER — HOSPITAL ENCOUNTER (OUTPATIENT)
Dept: MRI IMAGING | Age: 55
Discharge: HOME | End: 2020-01-15
Payer: COMMERCIAL

## 2020-01-22 ENCOUNTER — TELEPHONE (OUTPATIENT)
Dept: ORTHOPEDIC SURGERY | Age: 55
End: 2020-01-22

## 2020-01-22 RX ORDER — TRAMADOL HYDROCHLORIDE 50 MG/1
50 TABLET ORAL EVERY 8 HOURS PRN
Qty: 21 TABLET | Refills: 0 | Status: SHIPPED | OUTPATIENT
Start: 2020-01-22 | End: 2020-01-29

## 2020-01-22 RX ORDER — DIAZEPAM 5 MG/1
10 TABLET ORAL ONCE
Qty: 2 TABLET | Refills: 0 | Status: SHIPPED | OUTPATIENT
Start: 2020-01-22 | End: 2020-01-22

## 2020-01-22 NOTE — TELEPHONE ENCOUNTER
Patient called to inform dr Melania Hanley that central scheduling messed up his MRI again. He states he took his medication and went to the hospital at the time they told him (10:30 arrive at 10:00). Patient states that when he showed up to check  In he was told the MRI had been cancelled. He is rescheduled for Friday but will need the medication to be sent to his pharmacy again. Patient states to call the registration reyes 1 to confirm the mixup but he doesn't know the number. FYI:  (looks like the appt was cancelled at 10:16 as \"other\"- no reason given o appointment screen. )     Patient is also requesting something be sent to the pharmacy for the pain. He can not take it anymore. Can not call patient as he has lost his phone however he ois having another one sent to him overnight so he will check with the pharmacy and also call back tomorrow.

## 2020-01-22 NOTE — TELEPHONE ENCOUNTER
Since I am unable to call him back to relay this message due to his phone issues, upon is return call please reach out to me so that his call may be transferred to me.

## 2020-01-24 ENCOUNTER — HOSPITAL ENCOUNTER (OUTPATIENT)
Dept: MRI IMAGING | Age: 55
Discharge: HOME | End: 2020-01-24
Payer: COMMERCIAL

## 2020-01-24 ENCOUNTER — TELEPHONE (OUTPATIENT)
Dept: ORTHOPEDIC SURGERY | Age: 55
End: 2020-01-24

## 2020-01-24 PROCEDURE — 73221 MRI JOINT UPR EXTREM W/O DYE: CPT

## 2020-01-24 NOTE — TELEPHONE ENCOUNTER
I spoke to pt, and let him know Dr Armen Ward will review his MRI, and get back with him Monday. Patient states he will continue icing, taking Ultram, Ibuprofen, Tylenol and CBD oil, until then, for his pain. Routed message to Dr Armen Ward.

## 2020-01-24 NOTE — TELEPHONE ENCOUNTER
Pt calling and an concerns about his r shldr is increasing pain. Pt states he went to the ER last night and was told to take motrin and tylenol for the pain. Pt also, stated he was prescribed tramadol and with the meds he is taking; it is intolerable on his stomach. Pt states he has an MRI schedule today @ 1030am. Would like to know what he can do with the pain he is having. Please advise.       Call [phone] to discuss

## 2020-01-27 ENCOUNTER — OFFICE VISIT (OUTPATIENT)
Dept: ORTHOPEDIC SURGERY | Age: 55
End: 2020-01-27

## 2020-01-27 VITALS — BODY MASS INDEX: 33.18 KG/M2 | HEART RATE: 74 BPM | RESPIRATION RATE: 19 BRPM | WEIGHT: 244.93 LBS | HEIGHT: 72 IN

## 2020-01-27 PROCEDURE — 99024 POSTOP FOLLOW-UP VISIT: CPT | Performed by: ORTHOPAEDIC SURGERY

## 2020-01-27 NOTE — PROGRESS NOTES
Shoulder Arthroscopy Follow-up  Yonas Mcdonald is here for follow up after right shoulder arthroscopic surgery. Surgery date was 11/8/19. Findings at surgery: large rotator cuff tear, labral fraying. The patient's pain is rated at 9/10. He continues to complain of severe pain. Physical Examination:  Pulse 74   Resp 19   Ht 6' 0.01\" (1.829 m)   Wt 244 lb 14.9 oz (111.1 kg)   BMI 33.21 kg/m²     Patient is awake, alert, and in no acute distress. He is in sling. Sensation is intact to light touch in the axillary, median, radial, and ulnar nerve distribution bilaterally. The EPL, FPL, and interossei are grossly intact bilaterally. The Bilateral upper extremities are warm and well-perfused with brisk capillary refill. Right shoulder MRI: I independently reviewed the images, as well as the radiology report. Full-thickness retear of the superior rotator cuff which appears to be   complete to near complete involving the supraspinatus tendon and may also   involve a portion of conjoined anterior fibers of the infraspinatus tendon. Tear measures approximately 2.9 cm in AP dimension with tendon retraction by   up to approximately 2.8 cm.  Superimposed tendinosis on the torn retracted   supraspinatus tendon as well as on the infraspinatus tendon.       Stable mild atrophy of teres minor muscle which can be seen with   quadrilateral space syndrome.  No focal abnormalities are seen within the   quadrilateral space itself.       Stable appearance to chronic postsurgical change to the [facility] joint compatible   with prior Brie type procedure. Assessment:   1. Right shoulder rotator cuff re-tear  2.  2 1/2 months status post right shoulder arthroscopy, subacromial decompression, rotator cuff repair, labral debridement  3  Noncompliance  4. Possible drug-seeking behavior  5. Bipolar disorder      Plan:   Discussed that MRI demonstrates re-tear of his rotator cuff.       I have discussed with him

## 2020-02-03 ENCOUNTER — TELEPHONE (OUTPATIENT)
Dept: PAIN MANAGEMENT | Age: 55
End: 2020-02-03

## 2020-02-07 NOTE — TELEPHONE ENCOUNTER
He has been scheduled for a new pt appt. Informed him that the first appointment may only be rescheduled once and no showing or giving less than 24 hour notice of inability to come to first visit will result in a cancellation of the referral. Advised him that if he arrives to the appt without completed paperwork, or if he does not arrive early enough to complete it prior to being seen, his appointment may be rescheduled for the next available new patient appt. Also advised him to prepare to be in the office for at least 2-3 hours and that once established with one of our 2 providers, he may not switch to the other.      He requested his new patient packet to be sent to him via: Mail (address confirmed, appt card included)

## 2020-04-17 ENCOUNTER — TELEPHONE (OUTPATIENT)
Dept: ORTHOPEDIC SURGERY | Age: 55
End: 2020-04-17

## 2020-04-20 ENCOUNTER — HOSPITAL ENCOUNTER (INPATIENT)
Age: 55
LOS: 8 days | Discharge: ANOTHER HOSPITAL | DRG: 045 | End: 2020-04-28
Attending: EMERGENCY MEDICINE | Admitting: INTERNAL MEDICINE
Payer: COMMERCIAL

## 2020-04-20 ENCOUNTER — APPOINTMENT (OUTPATIENT)
Dept: CT IMAGING | Age: 55
DRG: 045 | End: 2020-04-20
Payer: COMMERCIAL

## 2020-04-20 ENCOUNTER — APPOINTMENT (OUTPATIENT)
Dept: GENERAL RADIOLOGY | Age: 55
DRG: 045 | End: 2020-04-20
Payer: COMMERCIAL

## 2020-04-20 PROBLEM — R77.8 ELEVATED TROPONIN: Status: ACTIVE | Noted: 2020-04-20

## 2020-04-20 PROBLEM — R79.89 ELEVATED TROPONIN: Status: ACTIVE | Noted: 2020-04-20

## 2020-04-20 LAB
A/G RATIO: 1.3 (ref 1.1–2.2)
ACETAMINOPHEN LEVEL: <5 UG/ML (ref 10–30)
ALBUMIN SERPL-MCNC: 3.9 G/DL (ref 3.4–5)
ALP BLD-CCNC: 119 U/L (ref 40–129)
ALT SERPL-CCNC: 30 U/L (ref 10–40)
AMPHETAMINE SCREEN, URINE: POSITIVE
ANION GAP SERPL CALCULATED.3IONS-SCNC: 13 MMOL/L (ref 3–16)
AST SERPL-CCNC: 28 U/L (ref 15–37)
BARBITURATE SCREEN URINE: ABNORMAL
BASOPHILS ABSOLUTE: 0.1 K/UL (ref 0–0.2)
BASOPHILS RELATIVE PERCENT: 1 %
BENZODIAZEPINE SCREEN, URINE: POSITIVE
BILIRUB SERPL-MCNC: 0.7 MG/DL (ref 0–1)
BILIRUBIN URINE: NEGATIVE
BLOOD, URINE: NEGATIVE
BUN BLDV-MCNC: 14 MG/DL (ref 7–20)
CALCIUM SERPL-MCNC: 9.1 MG/DL (ref 8.3–10.6)
CANNABINOID SCREEN URINE: POSITIVE
CHLORIDE BLD-SCNC: 95 MMOL/L (ref 99–110)
CLARITY: CLEAR
CO2: 27 MMOL/L (ref 21–32)
COCAINE METABOLITE SCREEN URINE: ABNORMAL
COLOR: YELLOW
CREAT SERPL-MCNC: 0.9 MG/DL (ref 0.9–1.3)
EOSINOPHILS ABSOLUTE: 0.1 K/UL (ref 0–0.6)
EOSINOPHILS RELATIVE PERCENT: 1.3 %
EPITHELIAL CELLS, UA: 2 /HPF (ref 0–5)
ETHANOL: NORMAL MG/DL (ref 0–0.08)
GFR AFRICAN AMERICAN: >60
GFR NON-AFRICAN AMERICAN: >60
GLOBULIN: 3.1 G/DL
GLUCOSE BLD-MCNC: 108 MG/DL (ref 70–99)
GLUCOSE BLD-MCNC: 121 MG/DL (ref 70–99)
GLUCOSE URINE: NEGATIVE MG/DL
HCT VFR BLD CALC: 46.1 % (ref 40.5–52.5)
HEMOGLOBIN: 15.6 G/DL (ref 13.5–17.5)
HYALINE CASTS: 1 /LPF (ref 0–8)
KETONES, URINE: NEGATIVE MG/DL
LEUKOCYTE ESTERASE, URINE: NEGATIVE
LYMPHOCYTES ABSOLUTE: 1.5 K/UL (ref 1–5.1)
LYMPHOCYTES RELATIVE PERCENT: 18.1 %
Lab: ABNORMAL
MCH RBC QN AUTO: 31.3 PG (ref 26–34)
MCHC RBC AUTO-ENTMCNC: 33.9 G/DL (ref 31–36)
MCV RBC AUTO: 92.5 FL (ref 80–100)
METHADONE SCREEN, URINE: ABNORMAL
MICROSCOPIC EXAMINATION: YES
MONOCYTES ABSOLUTE: 0.7 K/UL (ref 0–1.3)
MONOCYTES RELATIVE PERCENT: 8.6 %
NEUTROPHILS ABSOLUTE: 5.9 K/UL (ref 1.7–7.7)
NEUTROPHILS RELATIVE PERCENT: 71 %
NITRITE, URINE: NEGATIVE
OPIATE SCREEN URINE: ABNORMAL
OXYCODONE URINE: ABNORMAL
PDW BLD-RTO: 15.3 % (ref 12.4–15.4)
PERFORMED ON: ABNORMAL
PH UA: 7.5
PH UA: 7.5 (ref 5–8)
PHENCYCLIDINE SCREEN URINE: ABNORMAL
PLATELET # BLD: 366 K/UL (ref 135–450)
PMV BLD AUTO: 8 FL (ref 5–10.5)
POTASSIUM REFLEX MAGNESIUM: 3.6 MMOL/L (ref 3.5–5.1)
PROPOXYPHENE SCREEN: ABNORMAL
PROTEIN UA: 30 MG/DL
RBC # BLD: 4.98 M/UL (ref 4.2–5.9)
RBC UA: 2 /HPF (ref 0–4)
SALICYLATE, SERUM: <0.3 MG/DL (ref 15–30)
SODIUM BLD-SCNC: 135 MMOL/L (ref 136–145)
SPECIFIC GRAVITY UA: 1.02 (ref 1–1.03)
TOTAL PROTEIN: 7 G/DL (ref 6.4–8.2)
TROPONIN: 0.02 NG/ML
URINE REFLEX TO CULTURE: ABNORMAL
URINE TYPE: ABNORMAL
UROBILINOGEN, URINE: 0.2 E.U./DL
WBC # BLD: 8.3 K/UL (ref 4–11)
WBC UA: 1 /HPF (ref 0–5)

## 2020-04-20 PROCEDURE — G0480 DRUG TEST DEF 1-7 CLASSES: HCPCS

## 2020-04-20 PROCEDURE — 73562 X-RAY EXAM OF KNEE 3: CPT

## 2020-04-20 PROCEDURE — 99285 EMERGENCY DEPT VISIT HI MDM: CPT

## 2020-04-20 PROCEDURE — 2140000000 HC CCU INTERMEDIATE R&B

## 2020-04-20 PROCEDURE — 80053 COMPREHEN METABOLIC PANEL: CPT

## 2020-04-20 PROCEDURE — 72125 CT NECK SPINE W/O DYE: CPT

## 2020-04-20 PROCEDURE — 84484 ASSAY OF TROPONIN QUANT: CPT

## 2020-04-20 PROCEDURE — 81001 URINALYSIS AUTO W/SCOPE: CPT

## 2020-04-20 PROCEDURE — 93005 ELECTROCARDIOGRAM TRACING: CPT | Performed by: PHYSICIAN ASSISTANT

## 2020-04-20 PROCEDURE — 36415 COLL VENOUS BLD VENIPUNCTURE: CPT

## 2020-04-20 PROCEDURE — 2100000000 HC CCU R&B

## 2020-04-20 PROCEDURE — 85025 COMPLETE CBC W/AUTO DIFF WBC: CPT

## 2020-04-20 PROCEDURE — 80307 DRUG TEST PRSMV CHEM ANLYZR: CPT

## 2020-04-20 PROCEDURE — 70450 CT HEAD/BRAIN W/O DYE: CPT

## 2020-04-20 PROCEDURE — 73560 X-RAY EXAM OF KNEE 1 OR 2: CPT

## 2020-04-20 ASSESSMENT — ENCOUNTER SYMPTOMS
EYE REDNESS: 0
NAUSEA: 0
APNEA: 0
CHOKING: 0
SHORTNESS OF BREATH: 0
BACK PAIN: 0
VOMITING: 0
EYE DISCHARGE: 0
FACIAL SWELLING: 0
ABDOMINAL PAIN: 0

## 2020-04-21 ENCOUNTER — APPOINTMENT (OUTPATIENT)
Dept: CT IMAGING | Age: 55
DRG: 045 | End: 2020-04-21
Payer: COMMERCIAL

## 2020-04-21 ENCOUNTER — APPOINTMENT (OUTPATIENT)
Dept: MRI IMAGING | Age: 55
DRG: 045 | End: 2020-04-21
Payer: COMMERCIAL

## 2020-04-21 LAB
BASOPHILS ABSOLUTE: 0.1 K/UL (ref 0–0.2)
BASOPHILS RELATIVE PERCENT: 0.7 %
EKG ATRIAL RATE: 93 BPM
EKG DIAGNOSIS: NORMAL
EKG P AXIS: 43 DEGREES
EKG P-R INTERVAL: 144 MS
EKG Q-T INTERVAL: 352 MS
EKG QRS DURATION: 88 MS
EKG QTC CALCULATION (BAZETT): 437 MS
EKG R AXIS: 14 DEGREES
EKG T AXIS: 49 DEGREES
EKG VENTRICULAR RATE: 93 BPM
EOSINOPHILS ABSOLUTE: 0.2 K/UL (ref 0–0.6)
EOSINOPHILS RELATIVE PERCENT: 2.5 %
HCT VFR BLD CALC: 40.9 % (ref 40.5–52.5)
HEMOGLOBIN: 14 G/DL (ref 13.5–17.5)
LYMPHOCYTES ABSOLUTE: 1.7 K/UL (ref 1–5.1)
LYMPHOCYTES RELATIVE PERCENT: 23 %
MCH RBC QN AUTO: 31.7 PG (ref 26–34)
MCHC RBC AUTO-ENTMCNC: 34.3 G/DL (ref 31–36)
MCV RBC AUTO: 92.5 FL (ref 80–100)
MONOCYTES ABSOLUTE: 1 K/UL (ref 0–1.3)
MONOCYTES RELATIVE PERCENT: 13.2 %
NEUTROPHILS ABSOLUTE: 4.5 K/UL (ref 1.7–7.7)
NEUTROPHILS RELATIVE PERCENT: 60.6 %
PDW BLD-RTO: 15.4 % (ref 12.4–15.4)
PLATELET # BLD: 305 K/UL (ref 135–450)
PMV BLD AUTO: 8 FL (ref 5–10.5)
RBC # BLD: 4.42 M/UL (ref 4.2–5.9)
TROPONIN: 0.02 NG/ML
TROPONIN: 0.02 NG/ML
WBC # BLD: 7.4 K/UL (ref 4–11)

## 2020-04-21 PROCEDURE — 6360000002 HC RX W HCPCS: Performed by: INTERNAL MEDICINE

## 2020-04-21 PROCEDURE — 99222 1ST HOSP IP/OBS MODERATE 55: CPT | Performed by: INTERNAL MEDICINE

## 2020-04-21 PROCEDURE — 99223 1ST HOSP IP/OBS HIGH 75: CPT | Performed by: INTERNAL MEDICINE

## 2020-04-21 PROCEDURE — 6370000000 HC RX 637 (ALT 250 FOR IP): Performed by: INTERNAL MEDICINE

## 2020-04-21 PROCEDURE — 94761 N-INVAS EAR/PLS OXIMETRY MLT: CPT

## 2020-04-21 PROCEDURE — 84484 ASSAY OF TROPONIN QUANT: CPT

## 2020-04-21 PROCEDURE — 51702 INSERT TEMP BLADDER CATH: CPT

## 2020-04-21 PROCEDURE — 70498 CT ANGIOGRAPHY NECK: CPT

## 2020-04-21 PROCEDURE — 2580000003 HC RX 258: Performed by: INTERNAL MEDICINE

## 2020-04-21 PROCEDURE — 51798 US URINE CAPACITY MEASURE: CPT

## 2020-04-21 PROCEDURE — 93010 ELECTROCARDIOGRAM REPORT: CPT | Performed by: INTERNAL MEDICINE

## 2020-04-21 PROCEDURE — 6360000004 HC RX CONTRAST MEDICATION: Performed by: NURSE PRACTITIONER

## 2020-04-21 PROCEDURE — 85025 COMPLETE CBC W/AUTO DIFF WBC: CPT

## 2020-04-21 PROCEDURE — 70450 CT HEAD/BRAIN W/O DYE: CPT

## 2020-04-21 PROCEDURE — 36415 COLL VENOUS BLD VENIPUNCTURE: CPT

## 2020-04-21 PROCEDURE — 2140000000 HC CCU INTERMEDIATE R&B

## 2020-04-21 RX ORDER — SODIUM CHLORIDE 9 MG/ML
INJECTION, SOLUTION INTRAVENOUS CONTINUOUS
Status: DISCONTINUED | OUTPATIENT
Start: 2020-04-21 | End: 2020-04-21

## 2020-04-21 RX ORDER — QUETIAPINE FUMARATE 25 MG/1
25 TABLET, FILM COATED ORAL 4 TIMES DAILY PRN
Status: DISCONTINUED | OUTPATIENT
Start: 2020-04-21 | End: 2020-04-28 | Stop reason: HOSPADM

## 2020-04-21 RX ORDER — ACETAMINOPHEN 325 MG/1
650 TABLET ORAL EVERY 6 HOURS PRN
Status: DISCONTINUED | OUTPATIENT
Start: 2020-04-21 | End: 2020-04-28 | Stop reason: HOSPADM

## 2020-04-21 RX ORDER — SODIUM CHLORIDE 0.9 % (FLUSH) 0.9 %
10 SYRINGE (ML) INJECTION PRN
Status: DISCONTINUED | OUTPATIENT
Start: 2020-04-21 | End: 2020-04-28 | Stop reason: HOSPADM

## 2020-04-21 RX ORDER — FAMOTIDINE 20 MG/1
20 TABLET, FILM COATED ORAL 2 TIMES DAILY
Status: DISCONTINUED | OUTPATIENT
Start: 2020-04-21 | End: 2020-04-21

## 2020-04-21 RX ORDER — LISINOPRIL 20 MG/1
20 TABLET ORAL DAILY
Status: DISCONTINUED | OUTPATIENT
Start: 2020-04-21 | End: 2020-04-24

## 2020-04-21 RX ORDER — TAMSULOSIN HYDROCHLORIDE 0.4 MG/1
0.4 CAPSULE ORAL NIGHTLY
Status: DISCONTINUED | OUTPATIENT
Start: 2020-04-21 | End: 2020-04-28 | Stop reason: HOSPADM

## 2020-04-21 RX ORDER — SODIUM CHLORIDE 0.9 % (FLUSH) 0.9 %
10 SYRINGE (ML) INJECTION EVERY 12 HOURS SCHEDULED
Status: DISCONTINUED | OUTPATIENT
Start: 2020-04-21 | End: 2020-04-28 | Stop reason: HOSPADM

## 2020-04-21 RX ORDER — POLYETHYLENE GLYCOL 3350 17 G/17G
17 POWDER, FOR SOLUTION ORAL DAILY PRN
Status: DISCONTINUED | OUTPATIENT
Start: 2020-04-21 | End: 2020-04-28 | Stop reason: HOSPADM

## 2020-04-21 RX ORDER — ASPIRIN 81 MG/1
81 TABLET ORAL DAILY
Status: DISCONTINUED | OUTPATIENT
Start: 2020-04-21 | End: 2020-04-28 | Stop reason: HOSPADM

## 2020-04-21 RX ORDER — FAMOTIDINE 20 MG/1
20 TABLET, FILM COATED ORAL 2 TIMES DAILY
Status: DISCONTINUED | OUTPATIENT
Start: 2020-04-21 | End: 2020-04-28 | Stop reason: HOSPADM

## 2020-04-21 RX ORDER — ONDANSETRON 2 MG/ML
4 INJECTION INTRAMUSCULAR; INTRAVENOUS EVERY 6 HOURS PRN
Status: DISCONTINUED | OUTPATIENT
Start: 2020-04-21 | End: 2020-04-28 | Stop reason: HOSPADM

## 2020-04-21 RX ORDER — PROMETHAZINE HYDROCHLORIDE 25 MG/1
12.5 TABLET ORAL EVERY 6 HOURS PRN
Status: DISCONTINUED | OUTPATIENT
Start: 2020-04-21 | End: 2020-04-28 | Stop reason: HOSPADM

## 2020-04-21 RX ORDER — ACETAMINOPHEN 650 MG/1
650 SUPPOSITORY RECTAL EVERY 6 HOURS PRN
Status: DISCONTINUED | OUTPATIENT
Start: 2020-04-21 | End: 2020-04-28 | Stop reason: HOSPADM

## 2020-04-21 RX ORDER — METOPROLOL SUCCINATE 50 MG/1
50 TABLET, EXTENDED RELEASE ORAL DAILY
Status: DISCONTINUED | OUTPATIENT
Start: 2020-04-21 | End: 2020-04-21 | Stop reason: ALTCHOICE

## 2020-04-21 RX ORDER — ACETAMINOPHEN 500 MG
500 TABLET ORAL EVERY 4 HOURS PRN
Status: DISCONTINUED | OUTPATIENT
Start: 2020-04-21 | End: 2020-04-21 | Stop reason: SDUPTHER

## 2020-04-21 RX ADMIN — METOPROLOL SUCCINATE 50 MG: 50 TABLET, EXTENDED RELEASE ORAL at 07:54

## 2020-04-21 RX ADMIN — SODIUM CHLORIDE, PRESERVATIVE FREE 10 ML: 5 INJECTION INTRAVENOUS at 20:48

## 2020-04-21 RX ADMIN — ENOXAPARIN SODIUM 40 MG: 40 INJECTION SUBCUTANEOUS at 07:55

## 2020-04-21 RX ADMIN — SODIUM CHLORIDE: 9 INJECTION, SOLUTION INTRAVENOUS at 02:55

## 2020-04-21 RX ADMIN — QUETIAPINE FUMARATE 25 MG: 25 TABLET ORAL at 14:08

## 2020-04-21 RX ADMIN — FAMOTIDINE 20 MG: 20 TABLET ORAL at 07:55

## 2020-04-21 RX ADMIN — FAMOTIDINE 20 MG: 20 TABLET ORAL at 20:48

## 2020-04-21 RX ADMIN — TAMSULOSIN HYDROCHLORIDE 0.4 MG: 0.4 CAPSULE ORAL at 22:32

## 2020-04-21 RX ADMIN — ASPIRIN 81 MG: 81 TABLET, COATED ORAL at 07:55

## 2020-04-21 RX ADMIN — IOPAMIDOL 75 ML: 755 INJECTION, SOLUTION INTRAVENOUS at 15:09

## 2020-04-21 RX ADMIN — LISINOPRIL 20 MG: 20 TABLET ORAL at 13:23

## 2020-04-21 RX ADMIN — QUETIAPINE FUMARATE 25 MG: 25 TABLET ORAL at 22:32

## 2020-04-21 SDOH — HEALTH STABILITY: MENTAL HEALTH: HOW MANY STANDARD DRINKS CONTAINING ALCOHOL DO YOU HAVE ON A TYPICAL DAY?: 3 OR 4

## 2020-04-21 SDOH — HEALTH STABILITY: MENTAL HEALTH: HOW OFTEN DO YOU HAVE A DRINK CONTAINING ALCOHOL?: 4 OR MORE TIMES A WEEK

## 2020-04-21 ASSESSMENT — PAIN SCALES - GENERAL
PAINLEVEL_OUTOF10: 0

## 2020-04-21 NOTE — ED NOTES
Fall risk screening completed. Fall risk bracelet applied to patient. Non-skid socks provided and placed on patient. The fall risk is indicated using  dome light . Based on score, a bed alarm was indicated and applied. The call light is within the patient's reach, and instructions for use were provided. The bed is in the lowest position with wheels locked. The patient has been advised to notify staff, using the call light, if there is a need to get up or use restroom. The patient does not verbalize understanding of safety precautions and how to contact staff for assistance.         Yarelis Brunner RN  04/20/20 0028
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
## 2020-04-21 NOTE — ED PROVIDER NOTES
[address] [place]      Pt Name: Diana Perez  CLAY:[id number]  [place] 1965  Date of evaluation: 4/20/2020  Provider: Debi Laura PA-C     This patient was seen and evaluated by attending physician Dr. Amita Arora MD      Chief Complaint:    Chief Complaint   Patient presents with    Altered Mental Status       Nursing Notes, Past Medical Hx, Past Surgical Hx, Social Hx, Allergies, and Family Hx were all reviewed and agreed with or any disagreements were addressed in the HPI.    HPI:  (Location, Duration, Timing, Severity, Quality, Assoc Sx, Context, Modifying factors)  This is a  48 y.o. male brought in by squad for altered mental status. Patient information given partially by him and his fiancée. Apparently has had several falls over the last week or so. And even fell in the shower. Has a ulcerated 2-1/2 cm wound to the occipital scalp no surrounding erythema and no drainage. Some hair loss around the area. Patient is alert to person slightly confused to place and time. Complain of right knee pain. Unsure when the knee injury occurred. Denies chest pain, no abdominal pain, no nausea vomiting. Denies any visual changes. No headache. Denies any other extremity weakness besides her right knee. Patient denies drug use. No alcohol use. Denies being in any altercation.       PastMedical/Surgical History:      Diagnosis Date    ADHD     Arthritis     Bipolar 1 disorder ([facility] [address]     Depression     Hypertension     Sleep apnea          Procedure Laterality Date    FRACTURE SURGERY Right     FX Tibia / Fibula    LEG SURGERY Right     Remove Plate    SHOULDER ARTHROSCOPY Right 11/8/2019    RIGHT SHOULDER ARTHROSCOPY,  ROTATOR CUFF REPAIR, SUBACROMIC DECOMPRESSION, LABRIAL DEBRIDEMENT performed by Christian Linda MD at [email or web address]      x 4       Medications:  Previous Medications    ALBUTEROL SULFATE HFA completed and is negative. Physical Exam:  Physical Exam  Vitals signs and nursing note reviewed. Constitutional:       Appearance: He is well-developed. He is not diaphoretic. HENT:      Head: Normocephalic and atraumatic. Right Ear: Tympanic membrane normal.      Left Ear: Tympanic membrane normal.      Nose: Nose normal.      Mouth/Throat:      Mouth: Mucous membranes are moist.      Pharynx: Oropharynx is clear. Eyes:      General: No scleral icterus. Right eye: No discharge. Left eye: No discharge. Extraocular Movements: Extraocular movements intact. Conjunctiva/sclera: Conjunctivae normal.      Pupils: Pupils are equal, round, and reactive to light. Neck:      Musculoskeletal: Normal range of motion and neck supple. Comments: No nuchal rigidity  Cardiovascular:      Rate and Rhythm: Normal rate and regular rhythm. Heart sounds: Normal heart sounds. No murmur. No friction rub. No gallop. Pulmonary:      Effort: Pulmonary effort is normal. No respiratory distress. Breath sounds: Normal breath sounds. No wheezing or rales. Chest:      Chest wall: No tenderness. Abdominal:      General: Abdomen is flat. Bowel sounds are normal. There is no distension. Palpations: Abdomen is soft. There is no mass. Tenderness: There is no abdominal tenderness. Hernia: No hernia is present. Musculoskeletal:      Right hip: He exhibits normal range of motion, normal strength and no tenderness. Right knee: He exhibits decreased range of motion, swelling and bony tenderness. He exhibits no effusion, no deformity and no erythema. Tenderness found. Cervical back: He exhibits normal range of motion, no tenderness and no bony tenderness. Thoracic back: He exhibits normal range of motion, no tenderness and no bony tenderness. Lumbar back: He exhibits normal range of motion, no tenderness and no bony tenderness.         Legs:    Skin: General: Skin is warm and dry. Neurological:      Mental Status: He is alert and oriented to person, place, and time. He is not disoriented. GCS: GCS eye subscore is 4. GCS verbal subscore is 5. GCS motor subscore is 6. Cranial Nerves: No cranial nerve deficit. Sensory: No sensory deficit. Motor: No tremor, abnormal muscle tone or seizure activity.       Coordination: Coordination normal.      Comments: Finger to nose normal   Psychiatric:         Behavior: Behavior normal.         MEDICAL DECISION MAKING    Vitals:    Vitals:    04/20/20 1948 04/20/20 2123   BP: (!) 143/93    Pulse: 87    Resp: 17    Temp: 98.4 °F (36.9 °C)    TempSrc: Oral    SpO2: 100%    Weight:  226 lb 3.2 oz (102.6 kg)   Height:  6' (1.829 m)       LABS:  Labs Reviewed   COMPREHENSIVE METABOLIC PANEL W/ REFLEX TO MG FOR LOW K - Abnormal; Notable for the following components:       Result Value    Sodium 135 (*)     Chloride 95 (*)     Glucose 108 (*)     All other components within normal limits    Narrative:     Performed at:  [address] [employer] [address]   Phone [phone]   ACETAMINOPHEN LEVEL - Abnormal; Notable for the following components:    Acetaminophen Level <5 (*)     All other components within normal limits    Narrative:     Performed at:  [address] [employer] [address]   Phone [phone]   SALICYLATE LEVEL - Abnormal; Notable for the following components:    Salicylate, Serum <8.8 (*)     All other components within normal limits    Narrative:     Performed at:  [address] [employer] [address]   Phone [phone]   TROPONIN - Abnormal; Notable for the following components:    Troponin 0.02 (*)     All other components within normal limits    Narrative:     Performed at:  [address] [place] [address] Nidia Still  [place] [employer] [address]   Phone [phone]   URINE RT REFLEX TO CULTURE - Abnormal; Notable for the following components:    Protein, UA 30 (*)     All other components within normal limits    Narrative:     Performed at:  [address] [facility] [address]   Phone [phone]   [address] - Abnormal; Notable for the following components:    Amphetamine Screen, Urine POSITIVE (*)     Benzodiazepine Screen, Urine POSITIVE (*)     Cannabinoid Scrn, Ur POSITIVE (*)     All other components within normal limits    Narrative:     Performed at:  [address] [facility] [address]   Phone [phone]   POCT GLUCOSE - Abnormal; Notable for the following components:    POC Glucose 121 (*)     All other components within normal limits    Narrative:     Performed at:  [address] [facility] [address]   Phone [phone]   CBC WITH AUTO DIFFERENTIAL    Narrative:     Performed at:  [address] [facility] [address]   Phone [phone]   ETHANOL    Narrative:     Performed at:  [facility]  [address] [place] [address] [employer]   Phone [phone]   MICROSCOPIC URINALYSIS    Narrative:     Performed at:  [facility]  [address] [place] [address] [employer]   Phone [phone] of labs reviewed and werenegative at this time or not returned at the time of this note.     RADIOLOGY:   Non-plain film images such as CT, Ultrasound and MRI are read by the radiologist. Fabiana Melendez PA-C have directly visualized the radiologic plain film image(s) with the below findings:        Interpretation per the Radiologist below, if available at the time of this note:    XR KNEE RIGHT (3 VIEWS)   Final Result Mild soft tissue swelling to the anterior knee. No acute bony abnormality. CT HEAD WO CONTRAST   Final Result   No acute intracranial abnormality. CT CERVICAL SPINE WO CONTRAST   Final Result   No acute abnormality of the cervical spine. Xr Lumbar Spine (2-3 Views)    Result Date: 4/8/2020  Site: [facility] #: [id number] #: [id number]: Luann Javir #: [de-identified] #: [id number] #: [id number]: XR LUMBAR SPINE AP AND LATERALExam Date/Time: 04/08/2020 05:25 AMAdmitting Diagnosis: fallReason for Exam: fall Dictated by: Markie Emery. [facility] BASIL: 04/08/2020 05:37 AMT: This document is confidential medical information. Unauthorized disclosure or use of this information is prohibited by law. If you are not the intended recipient of this document, please advise us  by calling immediately [phone]. Impression/Conclusion below HISTORY:  fall  fall 8 days ago,pain/numbness right leg COMPARISON: None NOTE:  If there are questions about the content of this report, please contact [address] [place] [address] radiology by calling [phone] FINDINGS: ALIGNMENT:  Unremarkable BONES:  Unremarkable. No aggressive osseous lesion or fracture POST-SURGICAL FINDINGS:  None DISC LEVELS:  Small degenerative endplate spurs Y99-C7, U7-2, L2-3, L3-4 FACET JOINTS:  Unremarkable LUMBOSACRAL JUNCTION:  Unremarkable SACRUM AND SI JOINTS:  Unremarkable OTHER:  None IMPRESSION: Multilevel small degenerative endplate spurs SIGNED BY: Evelio Meza.  Amie Rothman MD on 4/8/2020  5:33 AM   [facility] [phone] -  [facility] Call Center: [phone]       Xr Hip Right (2-3 Views)    Result Date: 4/8/2020  Site: [facility] #: [id number] #: [id number]: Luann Javir #: [de-identified] #: [id number]: [id number]: XR HIP RIGHT AP AND LAT W OR WO PELVISExam Date/Time: 04/08/2020 05:15 AMAdmitting Diagnosis: pain after fallReason for transaminases. Ethanol level nondetected. Acetaminophen less than 5, salicylate less than 0.3. Troponin 0 0.02.      X-ray right knee show mild soft tissue swelling to the anterior knee. No acute bony abnormality. CT of cervical spine shows no acute osseous abnormalities. CT of head without contrast showed no intracranial abnormalities. Placed a call out to patient primary care physician t who placed orders for admission for this patient. Patient was okay with this plan as well. Did discuss patient lab results and CT results with him. The patient tolerated their visit well. I evaluated the patient. The physician was available for consultation as needed. The patient and / or the family were informed of the results of any tests, a time was given to answer questions, a plan was proposed and they agreed with plan. CLINICAL IMPRESSION:  1. Altered mental status, unspecified altered mental status type    2. Frequent falls        DISPOSITION  DISPOSITION Decision To Admit 04/20/2020 10:30:40 PM          PATIENT REFERRED TO:  No follow-up provider specified.     DISCHARGE MEDICATIONS:  New Prescriptions    No medications on file       DISCONTINUED MEDICATIONS:  Discontinued Medications    No medications on file              (Please note the MDM and HPI sections of this note were completed with a voice recognition program.  Efforts were made to edit the dictations but occasionally words are mis-transcribed.)    Electronically signed, Soniya Seals PA-C,          Soniya Seals PA-C  04/24/20 0670

## 2020-04-21 NOTE — PROGRESS NOTES
4 Eyes Skin Assessment     The patient is being assess for  Admission    I agree that 2 RN's have performed a thorough Head to Toe Skin Assessment on the patient. ALL assessment sites listed below have been assessed. Areas assessed by both nurses: Margaret/Fang  [x]   Head, Face, and Ears   [x]   Shoulders, Back, and Chest  [x]   Arms, Elbows, and Hands   [x]   Coccyx, Sacrum, and IschIum  [x]   Legs, Feet, and Heels        Does the Patient have Skin Breakdown?   Yes LDA WOUND CARE was Initiated documentation include the Kristan-wound, Wound Assessment, Measurements, Dressing Treatment, Drainage, and Color\",         Fernando Prevention initiated:  Yes   Wound Care Orders initiated:  Yes      [address]  nurse consulted for Pressure Injury (Stage 3,4, Unstageable, DTI, NWPT, and Complex wounds), New and Established Ostomies:  Yes      Nurse 1 eSignature: Electronically signed by Yariel Lewis RN on 4/21/20 at 3:28 AM EDT    **SHARE this note so that the co-signing nurse is able to place an eSignature**    Nurse 2 eSignature: Electronically signed by Seema Baez RN on 4/21/20 at 3:32 AM EDT

## 2020-04-21 NOTE — PROGRESS NOTES
Patient picking at head wound. Ok to cover per wound care nurse. Scalp wound cleansed with chlorprep and covered with mepilex dressing.   Bettie Parada RN, CCRN-CSC

## 2020-04-21 NOTE — CONSULTS
[address]  Cardiology Consult Note        CC:    Elevated troponins           HPI:   This is a 48 y.o. male who was brought by the squad for altered mental status. Apparently he had several falls over the last several weeks he fell in the shower. His cliniq.ly screen is positive for amphetamines benzodiazepines and marijuana. We are asked to see the patient for elevated troponins of 0.02.  3 levels have been drawn and all of them are same at 0.02. No chest pain      Past Medical History:   Diagnosis Date    ADHD     Arthritis     Bipolar 1 disorder ([address]     Depression     Hypertension     Sleep apnea       Past Surgical History:   Procedure Laterality Date    FRACTURE SURGERY Right     FX Tibia / Fibula    LEG SURGERY Right     Remove Plate    SHOULDER ARTHROSCOPY Right 11/8/2019    RIGHT SHOULDER ARTHROSCOPY,  ROTATOR CUFF REPAIR, SUBACROMIC DECOMPRESSION, LABRIAL DEBRIDEMENT performed by Kisha Retana MD at [facility]      x 4      History reviewed. No pertinent family history. Social History     Tobacco Use    Smoking status: Current Every Day Smoker     Packs/day: 0.50     Types: Cigarettes    Smokeless tobacco: Never Used   Substance Use Topics    Alcohol use: Yes     Frequency: 4 or more times a week     Drinks per session: 3 or 4     Comment: Occassionally    Drug use: Yes     Types: Marijuana     Comment: Smokes for pain managment per sig. other.      Allergies   Allergen Reactions    Reglan [Metoclopramide]      Lock jaw      metoprolol succinate  50 mg Oral Daily    sodium chloride flush  10 mL Intravenous 2 times per day    enoxaparin  40 mg Subcutaneous Daily    aspirin  81 mg Oral Daily    famotidine  20 mg Oral BID       Review of Systems -   Constitutional: Negative for weight gain/loss; malaise, fever  Respiratory: Negative for Asthma;  cough and hemoptysis  Cardiovascular: Negative for palpitations,dizziness   Gastrointestinal: Negative for

## 2020-04-21 NOTE — H&P
Hospital Medicine History & Physical    Patient:  Yonas Serrato  YOB: 1965  Date of Service: 4/21/20  MRN: [id number]    PCP: Parris Hernandez MD    Date of Admission: 4/20/2020      Chief Complaint:    Chief Complaint   Patient presents with    Altered Mental Status         History Of Present Illness: The patient is a 48 y.o. male who presents to [facility] with c/o as above  Looks very confused  Agitated at times  Had a fall few days ago  Specifics as in er notes  Denies any other complaints    Past Medical History:        Diagnosis Date    ADHD     Arthritis     Bipolar 1 disorder ([facility] [address]     Depression     Hypertension     Sleep apnea        Past Surgical History:        Procedure Laterality Date    FRACTURE SURGERY Right     FX Tibia / Fibula    LEG SURGERY Right     Remove Plate    SHOULDER ARTHROSCOPY Right 11/8/2019    RIGHT SHOULDER ARTHROSCOPY,  ROTATOR CUFF REPAIR, SUBACROMIC DECOMPRESSION, LABRIAL DEBRIDEMENT performed by Catharine Hammans, MD at [facility]      x 4       Family History:  History reviewed. No pertinent family history. Medications Prior to Admission:    Prior to Admission medications    Medication Sig Start Date End Date Taking? Authorizing Provider   gabapentin (NEURONTIN) 400 MG capsule TAKE ONE CAPSULE BY MOUTH TWICE A DAY 4/8/20 4/8/20  Parris Hernandez MD   methylPREDNISolone (MEDROL, KONG,) 4 MG tablet Take by mouth. 4/8/20   Parris Hernandez MD   sildenafil (VIAGRA) 100 MG tablet Take 1 tablet by mouth daily as needed for Erectile Dysfunction 3/27/20   Parris Hernandez MD   amphetamine-dextroamphetamine (ADDERALL XR) 30 MG extended release capsule Take 1 capsule by mouth every morning for 30 days.  3/16/20 4/15/20  Amberly Booth MD   albuterol sulfate HFA (VENTOLIN HFA) 108 (90 Base)

## 2020-04-21 NOTE — CONSULTS
Neurology Consult Note  Reason for Consult: confusion    Chief complaint:difficult to obtain from the patient at this time due to mental status. Dr Gucci Mendoza MD asked me to see Oral D Ace Eisenmenger in consultation for evaluation of confusion    History of Present Illness:  Oral D Ace Eisenmenger is a 48 y.o. male who presents with altered mental status, falls. I obtained my information in large part via chart review. The patient is not a reliable historian at this time. The patient was recently revaluated at [facility] on 4/8 for RLE weakness/pain, apparently after falling backwards about a week prior hyperflexing the limb. Since the fall, it appears he had been complaining of the leg simply giving out on him. XR imaging was unrevealing for acute fracture. Per note, it was suspected that his symptoms were secondary to a peripheral nerve injury and was discharged. To the best of my knowledge, the patient has continued to fall at home, unsure if he had ever hit his head. He had developed some confusion, per chart beginning at least a week ago, felt to be worse more recently via previous reports by his fiance. Hew as transported to the ED last night in order to be evaluated for the above issues. BP was 143/93. CT head w/out any acute abnormalities. Urine drug screen was + for multiple substances (see below). Currently, he remains confused, fluctuating per RN/chart. The patient does have a hx of ADHD, supposedly not taking his Adderall as prescribed (though was amphetamine +). Also, he reportedly had previously been on benzodiazepines (though not prescribed recently), I believe finding an old prescription which he began taking recently(?).       Medical History:  Past Medical History:   Diagnosis Date    ADHD     Arthritis     Bipolar 1 disorder ([facility] [address]     Depression     Hypertension     Sleep apnea      Past Surgical History:   Procedure Laterality Date    FRACTURE SURGERY Right FX Tibia / Fibula    LEG SURGERY Right     Remove Plate    SHOULDER ARTHROSCOPY Right 11/8/2019    RIGHT SHOULDER ARTHROSCOPY,  ROTATOR CUFF REPAIR, SUBACROMIC DECOMPRESSION, LABRIAL DEBRIDEMENT performed by Leo Zarate MD at [address]      x 4     Scheduled Meds:   metoprolol succinate  50 mg Oral Daily    sodium chloride flush  10 mL Intravenous 2 times per day    enoxaparin  40 mg Subcutaneous Daily    aspirin  81 mg Oral Daily    famotidine  20 mg Oral BID     Continuous Infusions:   sodium chloride 100 mL/hr at 04/21/20 0255     Medications Prior to Admission:   gabapentin (NEURONTIN) 400 MG capsule, TAKE ONE CAPSULE BY MOUTH TWICE A DAY  methylPREDNISolone (MEDROL, KONG,) 4 MG tablet, Take by mouth.  sildenafil (VIAGRA) 100 MG tablet, Take 1 tablet by mouth daily as needed for Erectile Dysfunction  amphetamine-dextroamphetamine (ADDERALL XR) 30 MG extended release capsule, Take 1 capsule by mouth every morning for 30 days. albuterol sulfate HFA (VENTOLIN HFA) 108 (90 Base) MCG/ACT inhaler, Inhale 2 puffs into the lungs 4 times daily as needed for Wheezing  fluticasone (FLONASE) 50 MCG/ACT nasal spray, 2 sprays by Each Nostril route daily  predniSONE (DELTASONE) 20 MG tablet, 2 tab po daily for 4 days then 1 tab po daily for 3 days  testosterone (ANDROGEL; TESTIM) 50 MG/5GM (1%) GEL 1% gel, Daily to shoulder alternatingly  buPROPion (WELLBUTRIN XL) 300 MG extended release tablet, TK 1 T PO QD  traZODone (DESYREL) 50 MG tablet, Take 1 tablet by mouth nightly as needed for Sleep  cetirizine (ZYRTEC ALLERGY) 10 MG tablet, Take 1 tablet by mouth daily  ibuprofen (IBU) 600 MG tablet, Take 1 tablet by mouth every 6 hours as needed for Pain  metoprolol succinate (TOPROL XL) 50 MG extended release tablet, Take 1 tablet by mouth daily    Allergies   Allergen Reactions    Reglan [Metoclopramide]      Lock jaw     History reviewed. No pertinent family history.     Social History     Tobacco Use   Smoking Status Current Every Day Smoker    Packs/day: 0.50    Types: Cigarettes   Smokeless Tobacco Never Used     Social History     Substance and Sexual Activity   Drug Use Yes    Types: Marijuana    Comment: Smokes for pain managment per sig. other. Social History     Substance and Sexual Activity   Alcohol Use Yes    Frequency: 4 or more times a week    Drinks per session: 3 or 4    Comment: Occassionally     ROS:  Constitutional- No weight loss or fevers  Eyes- No diplopia. No photophobia. Ears/nose/throat- No dysphagia. No Dysarthria  Cardiovascular- No palpitations. No chest pain  Respiratory- No dyspnea. No Cough  Gastrointestinal- No Abdominal pain. No Vomiting. Genitourinary- No incontinence. No urinary retention  Musculoskeletal- No myalgia. + arthralgia  Skin- No rash. No easy bruising. Psychiatric- hx depression. No anxiety  Endocrine- No diabetes. No thyroid issues. Hematologic- No bleeding difficulty. No fatigue  Neurologic- + weakness. No Headache. Exam:  Blood pressure (!) 153/98, pulse 81, temperature 98 °F (36.7 °C), temperature source Oral, resp. rate 15, height 6' (1.829 m), weight 224 lb 3.3 oz (101.7 kg), SpO2 97 %. Constitutional    Vital signs: BP, HR, and RR reviewed   General alert, no distress, well-nourished  Eyes: unable to visualize the fundi  Cardiovascular: pulses symmetric in all 4 extremities. Psychiatric: cooperative with parts of exam.    Neurologic  Mental status:   orientation to person. Otherwise unable to answer correctly. General fund of knowledge difficult to assess due to mental status. Memory difficult to assess due to mental status. Attention poor at times. Language no lucille aphasia. Comprehension follows a few simple commands. More difficulty w/ two step. Cranial nerves:   CN2: visual fields grossly full.     CN 3,4,6: extraocular muscles intact  CN5: facial sensation symmetric   CN7: face symmetric without dysarthria  CN8: hearing grossly intact  CN9: palate elevated symmetrically  CN11: trap full strength on shoulder shrug  CN12: tongue midline with protrusion  Strength: RLE weakness. Otherwise, good strength in the other 3 limbs. Deep tendon reflexes: diminished RLE patellar reflex. Sensory: ?RUE/RLE sensory changes. Cerebellar/coordination: finger nose finger difficult to assess due to ability to cooperate w/ exam.   Tone: normal in all 4 extremities  Gait: deferred at this time for safety. RN did suggest that he was able to ambulate w/ contact guard assistance. Labs  Glucose 108  Na 135  K 3.6  BUN 14  Creatinine 0.9  LFTs normal    WBC 7.4K  Hg 14.0  Platelets 773    UA no evidence of infection. ETOH none detected  Urine drug screen + amphetamines, + benzodiazepines, + cannabinoids    Studies  CT head w/o 4/20/20, independently reviewed  No acute intracranial abnormality. Impression  1. Recent influx of falls, likely secondary to RLE weakness/pain, which originated a few weeks back after a reported mechanical fall. 2.  RLE weakness/pain. Specific etiology unclear. 3.  Subacute encephalopathy, ongoing. Etiology also unclear. Could potentially be toxic/metabolic related to medication non-compliance/misuse, though cannot entirely exclude other etiologies (concussion? acute neurologic event?). 4.  + urine drug screen. Recommendations  1. MRI brain w/o.   2.  MRI lumbar spine w/o.    3.  If his encephalopathy does not clear up, an EEG may be reasonable. 4.  If lumbar imaging is unrevealing, likely will need EMG outpatient. Will discuss if there is any indication for any further spine imaging, though I wouldn't anticipate so. 5.  PT/OT evaluation.       Rebeka Allen NP  [address] [place] [address] [id number] Neurology    A copy of this note was provided for Dr Alondra Zurita MD

## 2020-04-21 NOTE — PROGRESS NOTES
Patient grabbing at genitals and stating \"it hurts\"  Nurse asked patient what hurts? Patient answered \"this bruise\"  Assessed area patient was grabbing, there was no bruise noted. Nurse asked patient if he had to pee:  Patient said yes and using expletives. Patient becoming aggressive and angry. Patient does not make eye contact with nurse. Patient looking straight ahead. Nurse had BSC showing patient the [facility]. Patient has to be given precise instructions to stand, now sit. Patient very agitated and cursing at nurses. Calling the nurse, Xavi Swann (patient's girlfriend). Nurse attempted to reorient patient. After approximately 10-15 minutes of attempting to reason with patient and patient sitting on bed and standing. Patient just voided. Nurse immediately placed urinal and caught most of urine. Patient's demeanor immediately changed to relaxed and calm. New sheet on bed and new gown placed on patient. Patient cooperated. Patient then told to sit and then to lay down in the bed. Patient resting with eyes closed.   Iver Hammans, RN, CCRN-CSC

## 2020-04-21 NOTE — ED NOTES
ED SBAR report provider to [facility]. Patient to be transported to Room 1310 via stretcher by ED tech. Patient transported with bedside cardiac monitor and with IV medications infusing. IV site clean, dry, and intact. MEWS score and pain assessed and documented. Updated patient and family on plan of care.      Renetta Perea RN  04/21/20 3081

## 2020-04-21 NOTE — PROGRESS NOTES
@0238-Pt arrived to CVICU from ED via stretcher. Pt able to transfer self from stretcher to CVU bed. Connected to all monitors. Pt alert to person and place. Disoriented to time and situation. Unable to state what happened to R knee and how he obtained head wound. Denies pain and SOB. @0243-Admission assessment completed. Pt unable to answer admission questions at this time. Vital signs stable. SR on monitor. Pt able to follow commands appropriately but does appear impulsive and anxious. Poor attention and judgement noted. 4 eyes skin assessment completed. Wound care consulted for open head wound. @0255-Started NS IVF @ 100 mL/hr per order. Pt denies needs. Call light placed within pt reach, instructed to call for assistance, verbalizes understanding. Bed in low and locked position with 3/4 side rails up. Non-skid footwear and soundcom dome light on. Intentional rounding will be performed by RN to ensure pt safety. Will continue to monitor. @0600-Vital signs stable. Pt resting in bed with eyes closed. No s/s of distress or pain. Call light in pt reach. Bed alarm engaged and connected to nurse call system. Will continue to monitor.     Electronically signed by Minerva Magallanes RN on 4/21/2020 at 6:19 AM

## 2020-04-21 NOTE — ED NOTES
Straight cath performed per sterile protocol. Urine specimen obtained and sent to lab. Call light within reach. Will continue to monitor patient.         Drew Salmon RN  04/20/20 1402

## 2020-04-21 NOTE — ED PROVIDER NOTES
fracture. CT head: No acute intracranial abnormality. CT cervical spine: No acute abnormality the cervical spine. This patient presents with frequent falls with confusion, oriented only to person. He is not oriented to place or time. He has no focal neurologic deficits. He has no acute intracranial findings on head CT. Other than a minimal elevation of his troponin he has no other acute abnormalities to explain his altered mental status/confusion. His primary care physician will be consulted for admission.     (Please note that portions of this note were completed with a voice recognition program.  Efforts were made to edit the dictations but occasionally words are mis-transcribed.)    Vaughn Mccallum MD  Attending Emergency Physician        Logan Huang MD  04/20/20 2151       Logan Huang MD  04/20/20 2151

## 2020-04-22 LAB — C DIFF TOXIN/ANTIGEN: NORMAL

## 2020-04-22 PROCEDURE — 87449 NOS EACH ORGANISM AG IA: CPT

## 2020-04-22 PROCEDURE — 6360000002 HC RX W HCPCS: Performed by: INTERNAL MEDICINE

## 2020-04-22 PROCEDURE — 97162 PT EVAL MOD COMPLEX 30 MIN: CPT

## 2020-04-22 PROCEDURE — 2580000003 HC RX 258: Performed by: INTERNAL MEDICINE

## 2020-04-22 PROCEDURE — 6370000000 HC RX 637 (ALT 250 FOR IP): Performed by: INTERNAL MEDICINE

## 2020-04-22 PROCEDURE — 36415 COLL VENOUS BLD VENIPUNCTURE: CPT

## 2020-04-22 PROCEDURE — 95816 EEG AWAKE AND DROWSY: CPT

## 2020-04-22 PROCEDURE — 94760 N-INVAS EAR/PLS OXIMETRY 1: CPT

## 2020-04-22 PROCEDURE — 2140000000 HC CCU INTERMEDIATE R&B

## 2020-04-22 PROCEDURE — 97530 THERAPEUTIC ACTIVITIES: CPT

## 2020-04-22 PROCEDURE — 83036 HEMOGLOBIN GLYCOSYLATED A1C: CPT

## 2020-04-22 PROCEDURE — 87324 CLOSTRIDIUM AG IA: CPT

## 2020-04-22 PROCEDURE — 99232 SBSQ HOSP IP/OBS MODERATE 35: CPT | Performed by: INTERNAL MEDICINE

## 2020-04-22 RX ADMIN — FAMOTIDINE 20 MG: 20 TABLET ORAL at 08:36

## 2020-04-22 RX ADMIN — LISINOPRIL 20 MG: 20 TABLET ORAL at 08:36

## 2020-04-22 RX ADMIN — SODIUM CHLORIDE, PRESERVATIVE FREE 10 ML: 5 INJECTION INTRAVENOUS at 20:04

## 2020-04-22 RX ADMIN — ENOXAPARIN SODIUM 40 MG: 40 INJECTION SUBCUTANEOUS at 08:38

## 2020-04-22 RX ADMIN — FAMOTIDINE 20 MG: 20 TABLET ORAL at 20:39

## 2020-04-22 RX ADMIN — TAMSULOSIN HYDROCHLORIDE 0.4 MG: 0.4 CAPSULE ORAL at 20:39

## 2020-04-22 RX ADMIN — QUETIAPINE FUMARATE 25 MG: 25 TABLET ORAL at 20:39

## 2020-04-22 RX ADMIN — ASPIRIN 81 MG: 81 TABLET, COATED ORAL at 08:36

## 2020-04-22 ASSESSMENT — PAIN SCALES - GENERAL
PAINLEVEL_OUTOF10: 0

## 2020-04-22 NOTE — CONSULTS
Consulting Physician: Dr. Gerald Greco    Reason for Consult: urinary retention    History of Present Illness: Yonas Parker is a 48 y.o. male who presented to the ED for frequent falls and confusion. He has been unable to follow many commands or be redirected by staff, becomes agitated. His tox screen is positive for amphetamines, benzos and marijuana. MRI of brain with punctate acute infarct involving the left parietal lobe. UA negative for infection, creatinine 0.9. He had increased agitation apparently with the urge to void but unable to go at the nurses direction. He was bladder scanned for 528cc, did end up voiding 1hr later and had a PVR of 313cc. There were then attempts to straight catheterize - he became agitated with this so an indwelling foster was placed for 325cc and he has now been placed in restraints due to multiple attempts to pull at urinary catheter without the ability to re-direct. Flomax was started last night.      Past Medical History:   Past Medical History:   Diagnosis Date    ADHD     Arthritis     Bipolar 1 disorder ([facility] [address]     Depression     Hypertension     Sleep apnea        Past Surgical History:  Past Surgical History:   Procedure Laterality Date    FRACTURE SURGERY Right     FX Tibia / Fibula    LEG SURGERY Right     Remove Plate    SHOULDER ARTHROSCOPY Right 11/8/2019    RIGHT SHOULDER ARTHROSCOPY,  ROTATOR CUFF REPAIR, SUBACROMIC DECOMPRESSION, LABRIAL DEBRIDEMENT performed by Anders Lee MD at [facility]      x 4       Social History:  Social History     Socioeconomic History    Marital status:      Spouse name: Not on file    Number of children: Not on file    Years of education: Not on file    Highest education level: Not on file   Occupational History    Not on file   Social Needs    Financial resource strain: Not on file    Food insecurity     Worry: Not on file     Inability: Not on file   [employer] needs

## 2020-04-22 NOTE — PROGRESS NOTES
Information;Decreased recall of recent events  Safety Judgement: Decreased awareness of need for assistance  Insights: Decreased awareness of deficits  Initiation: Requires cues for some  Sequencing: Requires cues for some    Objective     Observation/Palpation  Observation: Occipital Scalp Laceration (recent fall pta). AROM RLE (degrees)  RLE AROM: WFL  AROM LLE (degrees)  LLE AROM : WFL  AROM RUE (degrees)  RUE General AROM: Mid/endrange limit overhead Shld (multiple surg, noncompliant with Out-Pt PT follow-up). AROM LUE (degrees)  LUE AROM : WFL  LUE General AROM: Endrange limit overhead Shld. Strength Other  Other: Unable to fully assess at this time due to pt confusion. Appears at least 3+/5 throughout, comparable (limited R Shld due to recent surgs). Sensation  Overall Sensation Status: (Pt denies any numb/tingling. )  Bed mobility  Comment: Pt able to move/reposition in bed although limited due to confusion. Transfers  Bed to Chair: Unable to assess  Comment: Defer any EOB/OOB activities at this time due to pt confusion. Plan   Plan  Times per week: 3-5x week while in acute care setting. Current Treatment Recommendations: Functional Mobility Training, Transfer Training, Gait Training, Stair training, Safety Education & Training, Patient/Caregiver Education & Training  Safety Devices  Type of devices: Bed alarm in place, Call light within reach, Left in bed, Nurse notified  Restraints  Initially in place: Yes  Restraints: UE soft restraints reapplied  following completion PT Eval.       Goals  Short term goals  Time Frame for Short term goals: Upon d/c acute care setting. Short term goal 1: Bed Mob Independent. Short term goal 2: Transfers with/without assist device Supervision. Short term goal 3: Amb with/without assist device 150' SBA/Supervision. Patient Goals   Patient goals : None stated.         Therapy Time   Individual Concurrent Group Co-treatment   Time

## 2020-04-22 NOTE — PROGRESS NOTES
Hospitalist Progress Note  4/22/2020 2:31 PM  Subjective:   Admit Date: 4/20/2020  PCP: Mignon Henao MD  Interval History: pt is still confused  Last night events noted  Chart reviewed. Diet: DIET GENERAL;  Medications:   Scheduled Meds:   sodium chloride flush  10 mL Intravenous 2 times per day    enoxaparin  40 mg Subcutaneous Daily    aspirin  81 mg Oral Daily    famotidine  20 mg Oral BID    lisinopril  20 mg Oral Daily    tamsulosin  0.4 mg Oral Nightly     Continuous Infusions:  CBC:   Recent Labs     04/20/20 1954 04/21/20 0326   WBC 8.3 7.4   HGB 15.6 14.0    305     BMP:    Recent Labs     04/20/20 1953   *   K 3.6   CL 95*   CO2 27   BUN 14   CREATININE 0.9   GLUCOSE 108*     Hepatic:   Recent Labs     04/20/20 1953   AST 28   ALT 30   BILITOT 0.7   ALKPHOS 119     Troponin:   Recent Labs     04/20/20 1953 04/21/20 0326 04/21/20  1032   TROPONINI 0.02* 0.02* 0.02*     BNP: No results for input(s): BNP in the last 72 hours. Lipids: No results for input(s): CHOL, HDL in the last 72 hours. Invalid input(s): LDLCALCU  INR: No results for input(s): INR in the last 72 hours. Objective:   Vitals: /84   Pulse 76   Temp 98.5 °F (36.9 °C) (Oral)   Resp 19   Ht 6' (1.829 m)   Wt 220 lb 0.3 oz (99.8 kg)   SpO2 99%   BMI 29.84 kg/m²   General appearance: alert,awake,HEENT: Head: Normal, normocephalic, atraumatic, anicteric, pupils are reactive to light. Dry mucous membrane. Neck: no adenopathy, no carotid bruit, supple, symmetrical, trachea midline and thyroid not enlarged, symmetric, no tenderness/mass/nodules  Lungs: clear to auscultation bilaterally  Heart: regular rate and rhythm, S1, S2 normal, no murmur, click, rub or gallop  Abdomen: soft, non-tender; bowel sounds normal; no masses,  no organomegaly  Extremities: extremities normal, atraumatic, no cyanosis or edema  Neurologic: Mental status: Alert,    Assessment and Plan:   1. CVA  2.  Urinary retention    Patient Active Problem List:     Complete tear of right rotator cuff     Elevated troponin     Altered mental status     Frequent falls     Hypertension     Cerebrovascular accident (CVA) ([facility] [address]    Pt is stable. Discussed with staff and pt about the plan. See the orders for further plan. Will proceed further acc to pt's progress.   Time spent with management of the pt is-20 minutes      Electronically signed by: Parris Hernandez MD, on 4/22/2020, at 2:31 PM

## 2020-04-22 NOTE — PROGRESS NOTES
Occupational Therapy    Order received. RN advised to hold therapy eval at this time as pt agitated and not following directions/commands. Will follow up as pt condition permit.     Laurel Chambers, OTR/L

## 2020-04-23 ENCOUNTER — TELEPHONE (OUTPATIENT)
Dept: PSYCHIATRY | Age: 55
End: 2020-04-23

## 2020-04-23 LAB
A/G RATIO: 1.5 (ref 1.1–2.2)
ALBUMIN SERPL-MCNC: 4.2 G/DL (ref 3.4–5)
ALP BLD-CCNC: 117 U/L (ref 40–129)
ALT SERPL-CCNC: 38 U/L (ref 10–40)
ANION GAP SERPL CALCULATED.3IONS-SCNC: 15 MMOL/L (ref 3–16)
ANTI-NUCLEAR ANTIBODY (ANA): NEGATIVE
AST SERPL-CCNC: 26 U/L (ref 15–37)
BILIRUB SERPL-MCNC: 1.6 MG/DL (ref 0–1)
BUN BLDV-MCNC: 17 MG/DL (ref 7–20)
C-REACTIVE PROTEIN: 4 MG/L (ref 0–5.1)
CALCIUM SERPL-MCNC: 9.5 MG/DL (ref 8.3–10.6)
CHLORIDE BLD-SCNC: 101 MMOL/L (ref 99–110)
CHOLESTEROL, TOTAL: 181 MG/DL (ref 0–199)
CO2: 25 MMOL/L (ref 21–32)
CREAT SERPL-MCNC: 1 MG/DL (ref 0.9–1.3)
ESTIMATED AVERAGE GLUCOSE: 105.4 MG/DL
FOLATE: >20 NG/ML (ref 4.78–24.2)
GFR AFRICAN AMERICAN: >60
GFR NON-AFRICAN AMERICAN: >60
GLOBULIN: 2.8 G/DL
GLUCOSE BLD-MCNC: 97 MG/DL (ref 70–99)
HBA1C MFR BLD: 5.3 %
HCT VFR BLD CALC: 47.1 % (ref 40.5–52.5)
HDLC SERPL-MCNC: 41 MG/DL (ref 40–60)
HEMOGLOBIN: 15.8 G/DL (ref 13.5–17.5)
LDL CHOLESTEROL CALCULATED: 94 MG/DL
MCH RBC QN AUTO: 31.1 PG (ref 26–34)
MCHC RBC AUTO-ENTMCNC: 33.6 G/DL (ref 31–36)
MCV RBC AUTO: 92.4 FL (ref 80–100)
PDW BLD-RTO: 15.5 % (ref 12.4–15.4)
PLATELET # BLD: 259 K/UL (ref 135–450)
PMV BLD AUTO: 8.6 FL (ref 5–10.5)
POTASSIUM SERPL-SCNC: 3.9 MMOL/L (ref 3.5–5.1)
RBC # BLD: 5.09 M/UL (ref 4.2–5.9)
SEDIMENTATION RATE, ERYTHROCYTE: 13 MM/HR (ref 0–20)
SODIUM BLD-SCNC: 141 MMOL/L (ref 136–145)
TOTAL PROTEIN: 7 G/DL (ref 6.4–8.2)
TOTAL SYPHILLIS IGG/IGM: NORMAL
TRIGL SERPL-MCNC: 230 MG/DL (ref 0–150)
TSH SERPL DL<=0.05 MIU/L-ACNC: 2.48 UIU/ML (ref 0.27–4.2)
VITAMIN B-12: 1071 PG/ML (ref 211–911)
VLDLC SERPL CALC-MCNC: 46 MG/DL
WBC # BLD: 8.4 K/UL (ref 4–11)

## 2020-04-23 PROCEDURE — 85027 COMPLETE CBC AUTOMATED: CPT

## 2020-04-23 PROCEDURE — 94760 N-INVAS EAR/PLS OXIMETRY 1: CPT

## 2020-04-23 PROCEDURE — 6370000000 HC RX 637 (ALT 250 FOR IP): Performed by: INTERNAL MEDICINE

## 2020-04-23 PROCEDURE — 86780 TREPONEMA PALLIDUM: CPT

## 2020-04-23 PROCEDURE — 80053 COMPREHEN METABOLIC PANEL: CPT

## 2020-04-23 PROCEDURE — 99233 SBSQ HOSP IP/OBS HIGH 50: CPT | Performed by: INTERNAL MEDICINE

## 2020-04-23 PROCEDURE — 2140000000 HC CCU INTERMEDIATE R&B

## 2020-04-23 PROCEDURE — 82746 ASSAY OF FOLIC ACID SERUM: CPT

## 2020-04-23 PROCEDURE — 93325 DOPPLER ECHO COLOR FLOW MAPG: CPT

## 2020-04-23 PROCEDURE — 86038 ANTINUCLEAR ANTIBODIES: CPT

## 2020-04-23 PROCEDURE — 51702 INSERT TEMP BLADDER CATH: CPT

## 2020-04-23 PROCEDURE — 84443 ASSAY THYROID STIM HORMONE: CPT

## 2020-04-23 PROCEDURE — 85652 RBC SED RATE AUTOMATED: CPT

## 2020-04-23 PROCEDURE — 97530 THERAPEUTIC ACTIVITIES: CPT

## 2020-04-23 PROCEDURE — 93321 DOPPLER ECHO F-UP/LMTD STD: CPT

## 2020-04-23 PROCEDURE — 2580000003 HC RX 258: Performed by: INTERNAL MEDICINE

## 2020-04-23 PROCEDURE — 82607 VITAMIN B-12: CPT

## 2020-04-23 PROCEDURE — 6360000002 HC RX W HCPCS: Performed by: INTERNAL MEDICINE

## 2020-04-23 PROCEDURE — 97166 OT EVAL MOD COMPLEX 45 MIN: CPT

## 2020-04-23 PROCEDURE — 51798 US URINE CAPACITY MEASURE: CPT

## 2020-04-23 PROCEDURE — 36415 COLL VENOUS BLD VENIPUNCTURE: CPT

## 2020-04-23 PROCEDURE — 86255 FLUORESCENT ANTIBODY SCREEN: CPT

## 2020-04-23 PROCEDURE — 93308 TTE F-UP OR LMTD: CPT

## 2020-04-23 PROCEDURE — 83516 IMMUNOASSAY NONANTIBODY: CPT

## 2020-04-23 PROCEDURE — 86140 C-REACTIVE PROTEIN: CPT

## 2020-04-23 PROCEDURE — 80061 LIPID PANEL: CPT

## 2020-04-23 RX ORDER — LORAZEPAM 2 MG/ML
1 INJECTION INTRAMUSCULAR
Status: ACTIVE | OUTPATIENT
Start: 2020-04-23 | End: 2020-04-23

## 2020-04-23 RX ORDER — LORAZEPAM 2 MG/ML
1 INJECTION INTRAMUSCULAR
Status: DISCONTINUED | OUTPATIENT
Start: 2020-04-23 | End: 2020-04-23

## 2020-04-23 RX ORDER — OMEPRAZOLE 40 MG/1
40 CAPSULE, DELAYED RELEASE ORAL DAILY
Status: ON HOLD | COMMUNITY
Start: 2020-04-08 | End: 2020-04-27 | Stop reason: HOSPADM

## 2020-04-23 RX ADMIN — SODIUM CHLORIDE, PRESERVATIVE FREE 10 ML: 5 INJECTION INTRAVENOUS at 10:49

## 2020-04-23 RX ADMIN — QUETIAPINE FUMARATE 25 MG: 25 TABLET ORAL at 12:06

## 2020-04-23 RX ADMIN — SODIUM CHLORIDE, PRESERVATIVE FREE 10 ML: 5 INJECTION INTRAVENOUS at 20:15

## 2020-04-23 RX ADMIN — FAMOTIDINE 20 MG: 20 TABLET ORAL at 20:11

## 2020-04-23 RX ADMIN — LISINOPRIL 20 MG: 20 TABLET ORAL at 10:49

## 2020-04-23 RX ADMIN — ASPIRIN 81 MG: 81 TABLET, COATED ORAL at 10:49

## 2020-04-23 RX ADMIN — QUETIAPINE FUMARATE 25 MG: 25 TABLET ORAL at 20:36

## 2020-04-23 RX ADMIN — ENOXAPARIN SODIUM 40 MG: 40 INJECTION SUBCUTANEOUS at 10:49

## 2020-04-23 RX ADMIN — QUETIAPINE FUMARATE 25 MG: 25 TABLET ORAL at 02:39

## 2020-04-23 RX ADMIN — FAMOTIDINE 20 MG: 20 TABLET ORAL at 10:49

## 2020-04-23 RX ADMIN — TAMSULOSIN HYDROCHLORIDE 0.4 MG: 0.4 CAPSULE ORAL at 20:11

## 2020-04-23 ASSESSMENT — PAIN SCALES - GENERAL
PAINLEVEL_OUTOF10: 0

## 2020-04-23 NOTE — PROGRESS NOTES
0715--I received handoff report from Lawrence Rose, [facility]. Pt disoriented x4 in bed with bed alarm on.     0825--I spoke with MRI. I told them that pt is still being uncooperative and unable to lay still. 0839--I spoke with pt's mother and updated her to pt's status and plan of care. 0852--I left message for Dr. Brice Vanessa regarding pt's bladder scan of 382 mL at [phone] from night shift RN in report. 0930--PT and OT working with pt.     0953--I spoke with Dr. Brice Vanessa after she was paged a 2nd time. Orders to be placed. 1103--Neurology NP at bedside. 1130--Echo attempted at bedside. Unable to be completed d/t pt unable to lay still. 1215--I fed pt his lunch. Pt's communication is word salad. 1259--I spoke with Dr. Brice Vanessa. Ativan orders to be placed for lumbar puncture procedure. 1319--Consent obtained for lumbar puncture from pt's mother over the phone. 1430-- Handoff report given to Rikki crews RN. Pt alert and oriented to self only. In bed. Soft bilateral wrist restraints in place. Bed alarm on. 1433--I called pt's fiance and updated her to pt's status and plan of care.      Electronically signed by Jimy Remy RN on 4/23/2020 at 2:41 PM

## 2020-04-23 NOTE — ADT AUTH CERT
Utilization Reviews         Stroke: Ischemic - Care Day 4 (4/23/2020) by Yahir Ward RN         Review Status Review Entered   Completed 4/23/2020 12:22       Criteria Review      Care Day: 4 Care Date: 4/23/2020 Level of Care:    Guideline Day 2    Level Of Care    (X) Stroke unit, ICU, telemetry, or floor    Clinical Status    (X) * Hemodynamic stability    4/23/2020 12:22 PM EDT by Jose Guadalupe Corcorna      hr 64-88  bp: 103/61    (X) * Mental status at baseline or stable    ( ) * Neurologic deficits absent or stable    (X) * Unimpaired swallowing    (X) * Tolerates sitting in chair    ( ) * Feeding with or without assistance    Activity    ( ) * Up to chair    Routes    (X) * Oral hydration, medications    (X) * Advance diet as tolerated    Interventions    (X) Neurologic checks    4/23/2020 12:22 PM EDT by Jose Guadalupe Corcoran      q 4 hrs    Medications    (X) Antithrombotic therapy    4/23/2020 12:22 PM EDT by Jose Guadalupe Corcoran      aspirin po daily    * Milestone   Additional Notes   4/23  Day 4      Pt remains on progressive care unit      Vitals: t: 36.6  p: 64 rr: 18 bp: 123/82 spo2: 94% ra      Abn labs:  vitamin B-12: 1071      Orders:   Lovenox 40mg sq daily   Valentine catheter   MRI lumbar spine   Psychiatry consult   Urology consult      Per Urology PN:      Impression/Plan:    - 54y. o. with encephalopathy and falls. Consulted for urinary retention. Catheter placed for 325cc. - Do not feel he is in true retention, creatinine normal. Likely having difficulty due to mentation. Suspect voiding symptoms will improve as mentation improves. -Discontinue urethral catheter as soon as possible to avoid risk of injury pulling at catheter.     - On Flomax        Stroke: Ischemic - Care Day 3 (4/22/2020) by Yahir Ward RN         Review Status Review Entered   Completed 4/23/2020 12:22       Criteria Review      Care Day: 3 Care Date: 4/22/2020 Level of Care:    Guideline Day 2    Level Of Care

## 2020-04-23 NOTE — TELEPHONE ENCOUNTER
They stated incorrectly  They wanted a consult   They were given the consult line as Dr Jean Carlos Menchaca is off today (unsure if rounding)   I explained if he is off they have someone covering so they need to call that consult number

## 2020-04-23 NOTE — PROGRESS NOTES
Handoff report received from ROGER Jackson, pt did had a bowel movement, joselyn care and foster care done, gown changed, bed pad and sheet changed, did feed pt with his dinner, no acute event occurred, pt left in a stable condition.   Electronically signed by Yelena Jackson RN on 4/23/2020 at 6:27 PM

## 2020-04-23 NOTE — PROGRESS NOTES
from Bed to walker. Patient never participates, not even initiating trunk motion toward stance. Says \"yes\", when asked if he wants to stand, but seems unable to motor plan/attend to participating in stance activity. Patient returned to bed. Ambulation  Ambulation?: No  Balance  Sitting - Static: Good;-    Plan   Plan  Times per week: 3-5x week while in acute care setting. Current Treatment Recommendations: Functional Mobility Training, Transfer Training, Gait Training, Stair training, Safety Education & Training, Patient/Caregiver Education & Training  Safety Devices  Type of devices: Bed alarm in place, Call light within reach, Left in bed, Nurse notified(RN [employer] notified)  Restraints  Initially in place: Yes  Restraints: UE soft restraints reapplied  following completion PT Nevin.     AM-PAC Score  AM-PAC Inpatient Mobility Raw Score : 9 (04/23/20 1255)  AM-PAC Inpatient T-Scale Score : 30.55 (04/23/20 1255)  Mobility Inpatient CMS 0-100% Score: 81.38 (04/23/20 1255)  Mobility Inpatient CMS G-Code Modifier : CM (04/23/20 1255)     Goals  Short term goals  Time Frame for Short term goals: Upon d/c acute care setting.  4- all goals ongoing. Short term goal 1: Bed Mob Independent. Short term goal 2: Transfers with/without assist device Supervision. Short term goal 3: Amb with/without assist device 150' SBA/Supervision. Patient Goals   Patient goals : None stated.       Therapy Time   Individual Concurrent Group Co-treatment   Time In 3887         Time Out 0948         Minutes 21            Eunice Gan PT  Electronically signed by Mike Saunders PT [id number] on 4/23/2020 at 1:34 PM

## 2020-04-24 ENCOUNTER — ANESTHESIA EVENT (OUTPATIENT)
Dept: INTERVENTIONAL RADIOLOGY/VASCULAR | Age: 55
DRG: 045 | End: 2020-04-24
Payer: COMMERCIAL

## 2020-04-24 ENCOUNTER — APPOINTMENT (OUTPATIENT)
Dept: INTERVENTIONAL RADIOLOGY/VASCULAR | Age: 55
DRG: 045 | End: 2020-04-24
Payer: COMMERCIAL

## 2020-04-24 ENCOUNTER — ANESTHESIA (OUTPATIENT)
Dept: INTERVENTIONAL RADIOLOGY/VASCULAR | Age: 55
DRG: 045 | End: 2020-04-24
Payer: COMMERCIAL

## 2020-04-24 VITALS
SYSTOLIC BLOOD PRESSURE: 91 MMHG | OXYGEN SATURATION: 98 % | RESPIRATION RATE: 21 BRPM | DIASTOLIC BLOOD PRESSURE: 42 MMHG

## 2020-04-24 LAB
GLUCOSE BLD-MCNC: 94 MG/DL (ref 70–99)
INR BLD: 1.03 (ref 0.86–1.14)
PERFORMED ON: NORMAL
PROTHROMBIN TIME: 12 SEC (ref 10–13.2)

## 2020-04-24 PROCEDURE — 36415 COLL VENOUS BLD VENIPUNCTURE: CPT

## 2020-04-24 PROCEDURE — 2580000003 HC RX 258: Performed by: NURSE ANESTHETIST, CERTIFIED REGISTERED

## 2020-04-24 PROCEDURE — 2709999900 IR LUMBAR PUNCTURE FOR DIAGNOSIS

## 2020-04-24 PROCEDURE — 009U3ZX DRAINAGE OF SPINAL CANAL, PERCUTANEOUS APPROACH, DIAGNOSTIC: ICD-10-PCS | Performed by: RADIOLOGY

## 2020-04-24 PROCEDURE — 87070 CULTURE OTHR SPECIMN AEROBIC: CPT

## 2020-04-24 PROCEDURE — 00JU3ZZ INSPECTION OF SPINAL CANAL, PERCUTANEOUS APPROACH: ICD-10-PCS | Performed by: RADIOLOGY

## 2020-04-24 PROCEDURE — 6360000002 HC RX W HCPCS: Performed by: NURSE ANESTHETIST, CERTIFIED REGISTERED

## 2020-04-24 PROCEDURE — 3700000001 HC ADD 15 MINUTES (ANESTHESIA)

## 2020-04-24 PROCEDURE — 62328 DX LMBR SPI PNXR W/FLUOR/CT: CPT

## 2020-04-24 PROCEDURE — 94760 N-INVAS EAR/PLS OXIMETRY 1: CPT

## 2020-04-24 PROCEDURE — 6370000000 HC RX 637 (ALT 250 FOR IP): Performed by: INTERNAL MEDICINE

## 2020-04-24 PROCEDURE — 85610 PROTHROMBIN TIME: CPT

## 2020-04-24 PROCEDURE — 6360000002 HC RX W HCPCS: Performed by: INTERNAL MEDICINE

## 2020-04-24 PROCEDURE — 2500000003 HC RX 250 WO HCPCS: Performed by: NURSE ANESTHETIST, CERTIFIED REGISTERED

## 2020-04-24 PROCEDURE — 2140000000 HC CCU INTERMEDIATE R&B

## 2020-04-24 PROCEDURE — 99233 SBSQ HOSP IP/OBS HIGH 50: CPT | Performed by: INTERNAL MEDICINE

## 2020-04-24 PROCEDURE — 3700000000 HC ANESTHESIA ATTENDED CARE

## 2020-04-24 PROCEDURE — 2580000003 HC RX 258: Performed by: INTERNAL MEDICINE

## 2020-04-24 RX ORDER — SODIUM CHLORIDE 9 MG/ML
INJECTION, SOLUTION INTRAVENOUS CONTINUOUS PRN
Status: DISCONTINUED | OUTPATIENT
Start: 2020-04-24 | End: 2020-04-24 | Stop reason: SDUPTHER

## 2020-04-24 RX ORDER — LIDOCAINE HYDROCHLORIDE 20 MG/ML
INJECTION, SOLUTION EPIDURAL; INFILTRATION; INTRACAUDAL; PERINEURAL PRN
Status: DISCONTINUED | OUTPATIENT
Start: 2020-04-24 | End: 2020-04-24 | Stop reason: SDUPTHER

## 2020-04-24 RX ORDER — LISINOPRIL 10 MG/1
10 TABLET ORAL DAILY
Status: DISCONTINUED | OUTPATIENT
Start: 2020-04-25 | End: 2020-04-28 | Stop reason: HOSPADM

## 2020-04-24 RX ORDER — LORAZEPAM 2 MG/ML
1 INJECTION INTRAMUSCULAR
Status: COMPLETED | OUTPATIENT
Start: 2020-04-24 | End: 2020-04-24

## 2020-04-24 RX ORDER — SODIUM CHLORIDE 0.9 % (FLUSH) 0.9 %
10 SYRINGE (ML) INJECTION EVERY 12 HOURS SCHEDULED
Status: DISCONTINUED | OUTPATIENT
Start: 2020-04-24 | End: 2020-04-24

## 2020-04-24 RX ORDER — PROPOFOL 10 MG/ML
INJECTION, EMULSION INTRAVENOUS PRN
Status: DISCONTINUED | OUTPATIENT
Start: 2020-04-24 | End: 2020-04-24 | Stop reason: SDUPTHER

## 2020-04-24 RX ORDER — SODIUM CHLORIDE 9 MG/ML
INJECTION, SOLUTION INTRAVENOUS CONTINUOUS
Status: DISCONTINUED | OUTPATIENT
Start: 2020-04-24 | End: 2020-04-27

## 2020-04-24 RX ORDER — SODIUM CHLORIDE 0.9 % (FLUSH) 0.9 %
10 SYRINGE (ML) INJECTION PRN
Status: DISCONTINUED | OUTPATIENT
Start: 2020-04-24 | End: 2020-04-28 | Stop reason: HOSPADM

## 2020-04-24 RX ADMIN — PHENYLEPHRINE HYDROCHLORIDE 200 MCG: 10 INJECTION INTRAVENOUS at 15:19

## 2020-04-24 RX ADMIN — PROPOFOL 50 MG: 10 INJECTION, EMULSION INTRAVENOUS at 14:59

## 2020-04-24 RX ADMIN — LORAZEPAM 1 MG: 2 INJECTION INTRAMUSCULAR; INTRAVENOUS at 10:02

## 2020-04-24 RX ADMIN — QUETIAPINE FUMARATE 25 MG: 25 TABLET ORAL at 20:44

## 2020-04-24 RX ADMIN — PROPOFOL 30 MG: 10 INJECTION, EMULSION INTRAVENOUS at 15:32

## 2020-04-24 RX ADMIN — PROPOFOL 50 MG: 10 INJECTION, EMULSION INTRAVENOUS at 15:18

## 2020-04-24 RX ADMIN — SODIUM CHLORIDE: 9 INJECTION, SOLUTION INTRAVENOUS at 22:29

## 2020-04-24 RX ADMIN — PROPOFOL 50 MG: 10 INJECTION, EMULSION INTRAVENOUS at 15:08

## 2020-04-24 RX ADMIN — QUETIAPINE FUMARATE 25 MG: 25 TABLET ORAL at 12:02

## 2020-04-24 RX ADMIN — SODIUM CHLORIDE, PRESERVATIVE FREE 10 ML: 5 INJECTION INTRAVENOUS at 09:39

## 2020-04-24 RX ADMIN — PHENYLEPHRINE HYDROCHLORIDE 200 MCG: 10 INJECTION INTRAVENOUS at 15:32

## 2020-04-24 RX ADMIN — LISINOPRIL 20 MG: 20 TABLET ORAL at 12:02

## 2020-04-24 RX ADMIN — QUETIAPINE FUMARATE 25 MG: 25 TABLET ORAL at 02:37

## 2020-04-24 RX ADMIN — LORAZEPAM 1 MG: 2 INJECTION INTRAMUSCULAR; INTRAVENOUS at 10:20

## 2020-04-24 RX ADMIN — PROPOFOL 50 MG: 10 INJECTION, EMULSION INTRAVENOUS at 15:26

## 2020-04-24 RX ADMIN — LIDOCAINE HYDROCHLORIDE 20 MG: 20 INJECTION, SOLUTION EPIDURAL; INFILTRATION; INTRACAUDAL; PERINEURAL at 14:59

## 2020-04-24 RX ADMIN — PHENYLEPHRINE HYDROCHLORIDE 200 MCG: 10 INJECTION INTRAVENOUS at 15:23

## 2020-04-24 RX ADMIN — FAMOTIDINE 20 MG: 20 TABLET ORAL at 12:02

## 2020-04-24 RX ADMIN — PHENYLEPHRINE HYDROCHLORIDE 200 MCG: 10 INJECTION INTRAVENOUS at 15:26

## 2020-04-24 RX ADMIN — PHENYLEPHRINE HYDROCHLORIDE 200 MCG: 10 INJECTION INTRAVENOUS at 15:17

## 2020-04-24 RX ADMIN — FAMOTIDINE 20 MG: 20 TABLET ORAL at 20:44

## 2020-04-24 RX ADMIN — TAMSULOSIN HYDROCHLORIDE 0.4 MG: 0.4 CAPSULE ORAL at 20:44

## 2020-04-24 RX ADMIN — SODIUM CHLORIDE: 9 INJECTION, SOLUTION INTRAVENOUS at 16:39

## 2020-04-24 RX ADMIN — SODIUM CHLORIDE: 9 INJECTION, SOLUTION INTRAVENOUS at 14:51

## 2020-04-24 ASSESSMENT — PAIN SCALES - GENERAL
PAINLEVEL_OUTOF10: 0

## 2020-04-24 ASSESSMENT — ENCOUNTER SYMPTOMS: SHORTNESS OF BREATH: 0

## 2020-04-24 NOTE — ANESTHESIA PRE PROCEDURE
Department of Anesthesiology  Preprocedure Note       Name:  Catalina Angela   Age:  48 y.o.  :  1965                                          MRN:  [id number]         Date:  2020      Surgeon: * No surgeons listed *    Procedure: IR LUMBAR PUNCTURE FOR DIAGNOSIS    Medications prior to admission:   Prior to Admission medications    Medication Sig Start Date End Date Taking? Authorizing Provider   omeprazole (PRILOSEC) 40 MG delayed release capsule Take 40 mg by mouth daily 20  Yes Historical Provider, MD   gabapentin (NEURONTIN) 400 MG capsule TAKE ONE CAPSULE BY MOUTH TWICE A DAY 20 Yes Tri Luna MD   sildenafil (VIAGRA) 100 MG tablet Take 1 tablet by mouth daily as needed for Erectile Dysfunction 3/27/20  Yes Tri Luna MD   amphetamine-dextroamphetamine (ADDERALL XR) 30 MG extended release capsule Take 1 capsule by mouth every morning for 30 days.  3/16/20 4/23/20 Yes Tri Luna MD   albuterol sulfate HFA (VENTOLIN HFA) 108 (90 Base) MCG/ACT inhaler Inhale 2 puffs into the lungs 4 times daily as needed for Wheezing 3/14/20  Yes Tri Luna MD   fluticasone (FLONASE) 50 MCG/ACT nasal spray 2 sprays by Each Nostril route daily 3/14/20  Yes Tri Luna MD   testosterone (ANDROGEL; TESTIM) 50 MG/5GM (1%) GEL 1% gel Daily to shoulder alternatingly 20 Yes Tri Luna MD   buPROPion (WELLBUTRIN XL) 300 MG extended release tablet TK 1 T PO QD 2/3/20  Yes Tri Luna MD   traZODone (DESYREL) 50 MG tablet Take 1 tablet by mouth nightly as needed for Sleep 20  Yes Tri Luna MD   cetirizine (ZYRTEC ALLERGY) 10 MG tablet Take 1 tablet by mouth daily 20  Yes Tri Luna MD   ibuprofen (IBU) 600 MG tablet Take 1 tablet by mouth every 6 hours as needed for Pain 19  Yes Kat Rodriges MD   metoprolol succinate (TOPROL XL) 50 MG extended release tablet Take 1 tablet by mouth daily 10/15/19  Yes Amberly ([facility] I63.9       Past Medical History:        Diagnosis Date    ADHD     Arthritis     Bipolar 1 disorder ([facility]     Depression     Hypertension     Sleep apnea        Past Surgical History:        Procedure Laterality Date    FRACTURE SURGERY Right     FX Tibia / Fibula    LEG SURGERY Right     Remove Plate    SHOULDER ARTHROSCOPY Right 11/8/2019    RIGHT SHOULDER ARTHROSCOPY,  ROTATOR CUFF REPAIR, SUBACROMIC DECOMPRESSION, LABRIAL DEBRIDEMENT performed by Norberto Carrizales MD at [facility]      x 4       Social History:    Social History     Tobacco Use    Smoking status: Current Every Day Smoker     Packs/day: 0.50     Types: Cigarettes    Smokeless tobacco: Never Used   Substance Use Topics    Alcohol use: Yes     Frequency: 4 or more times a week     Drinks per session: 3 or 4     Comment: Occassionally                                Ready to quit: Not Answered  Counseling given: Not Answered      Vital Signs (Current):   Vitals:    04/24/20 0533 04/24/20 0834 04/24/20 0841 04/24/20 1157   BP:   121/75 121/85   Pulse:   96 105   Resp:   18 18   Temp:   98.2 °F (36.8 °C) 98.6 °F (37 °C)   TempSrc:   Axillary Axillary   SpO2:  97% 97% 95%   Weight: 216 lb 0.8 oz (98 kg)      Height:                                                  BP Readings from Last 3 Encounters:   04/24/20 121/85   01/09/20 130/78   01/05/20 (!) 178/94       NPO Status:                                                                                 BMI:   Wt Readings from Last 3 Encounters:   04/24/20 216 lb 0.8 oz (98 kg)   01/27/20 244 lb 14.9 oz (111.1 kg)   01/09/20 245 lb (111.1 kg)     Body mass index is 29.3 kg/m².     CBC:   Lab Results   Component Value Date    WBC 8.4 04/23/2020    RBC 5.09 04/23/2020    HGB 15.8 04/23/2020    HCT 47.1 04/23/2020    MCV 92.4 04/23/2020    RDW 15.5 04/23/2020     04/23/2020       CMP:   Lab Results   Component Value Date     04/23/2020    K 3.9 04/23/2020    K

## 2020-04-24 NOTE — CARE COORDINATION
LSW reviewed chart. LSW called mother, Ambika Yan, to introduce  and to initiate discussion regarding DC planning needs. She reports she and her spouse live in [place], [place] and they are caring for pt's youngest son (25 yrs old) and he lives with them. She reports Patient has Four Children:  Nemo Murguia, 28years old  [phone]  Another Dgtr, 34years old  Dgtr, 25years old  Son 25years old, Steve Sánchez, that lives with she and spouse. Mother expressed concern that he go to a SNF agency in [place] due to [address] and she would prefer that he go to their hometown in [place] to the nursing home called [facility] where she reports they have NO COVID patients. Discussion held regarding crossing state lines with his insurance is not valid. Call placed to Eldest Dgtr, Nemo Murguia, left message asking for non urgent return call about discharge planning needs.   [place], [place]     Case Management   [phone]    4/24/2020  1:36 PM

## 2020-04-24 NOTE — CONSULTS
Psychiatry Consultation/Initial Inpatient Alec Fontanez M.D.  4/24/2020  11:02 AM      Referring Provider:  Alysha Dean MD    Recommendations:    1. Delirium/AMS-Unfortunately, I was not able to see this pt today because he had gone to a procedure. My review of this case, however, is in line with Neuro's thinking: All signs seem to point to delirium/encephalopathy. Certainly drug use may be part of the issue, and there are absolutely meds or drugs that can cause this sort of picture, including bath salts, K2/synthetic MJ, neurontin, pt's own benzos or withdrawal from such. I'll leave my usual delirium recs below. 1.  Delirium/Altered Mental Status    Delirium Basics:  Delirium is a waxing and waning disturbance in cognition, often multifactorial in etiology. Myriad different factors associated with being hospitalized can cause it. Chief among these are any kind of inflammatory process, sedation-causing medicines, intoxication or withdrawal, anything that affects brain structure or function, metabolic dysregulation, sleep deprivation, stress hormone activation, or even just being in an unfamiliar place, especially when combined with day/night dysregulation. The effect is usually worse in people over age 54 or in anyone with some degree of more global brain pathology like dementia, brain vascular disease, intellectual disability, etc.    The first step is a medical workup for common causes of altered mental status. This generally consists of cbc c diff, renal, lft, TSH, ua, drug screen, beta hcg.  rpr or hiv testing may be indicated. Imaging like a CXR, brain CT, or even MRI is usually a good idea for any new mental status change, unless an obvious etiologic agent is already identified. EEG, LP, and ECG should be considered if clinically indicated. Abnormalities in the above should be corrected.       A UA with + LE may well indicate an inflammatory process causing delirium, even if the enoxaparin  40 mg Subcutaneous Daily    aspirin  81 mg Oral Daily    famotidine  20 mg Oral BID    lisinopril  20 mg Oral Daily    tamsulosin  0.4 mg Oral Nightly     sodium chloride flush, acetaminophen **OR** acetaminophen, polyethylene glycol, promethazine **OR** ondansetron, QUEtiapine, perflutren lipid microspheres    Examination        Recent Results (from the past 168 hour(s))   POCT Glucose    Collection Time: 04/20/20  7:46 PM   Result Value Ref Range    POC Glucose 121 (H) 70 - 99 mg/dl    Performed on ACCU-CHEK    EKG 12 Lead    Collection Time: 04/20/20  7:50 PM   Result Value Ref Range    Ventricular Rate 93 BPM    Atrial Rate 93 BPM    P-R Interval 144 ms    QRS Duration 88 ms    Q-T Interval 352 ms    QTc Calculation (Bazett) 437 ms    P Axis 43 degrees    R Axis 14 degrees    T Axis 49 degrees    Diagnosis       Normal sinus rhythmNormal ECGWhen compared with ECG of 22-AUG-2019 15:05,Vent.  rate has increased BY  34 BPMQT has lengthenedConfirmed by Arlen Guerrero MD, Fay Cadena ([id number]) on 4/21/2020 8:22:56 AM   Comprehensive Metabolic Panel w/ Reflex to MG    Collection Time: 04/20/20  7:53 PM   Result Value Ref Range    Sodium 135 (L) 136 - 145 mmol/L    Potassium reflex Magnesium 3.6 3.5 - 5.1 mmol/L    Chloride 95 (L) 99 - 110 mmol/L    CO2 27 21 - 32 mmol/L    Anion Gap 13 3 - 16    Glucose 108 (H) 70 - 99 mg/dL    BUN 14 7 - 20 mg/dL    CREATININE 0.9 0.9 - 1.3 mg/dL    GFR Non-African American >60 >60    GFR African American >60 >60    Calcium 9.1 8.3 - 10.6 mg/dL    Total Protein 7.0 6.4 - 8.2 g/dL    Alb 3.9 3.4 - 5.0 g/dL    Albumin/Globulin Ratio 1.3 1.1 - 2.2    Total Bilirubin 0.7 0.0 - 1.0 mg/dL    Alkaline Phosphatase 119 40 - 129 U/L    ALT 30 10 - 40 U/L    AST 28 15 - 37 U/L    Globulin 3.1 g/dL   Ethanol    Collection Time: 04/20/20  7:53 PM   Result Value Ref Range    Ethanol Lvl None Detected mg/dL   Acetaminophen Level    Collection Time: 04/20/20  7:53 PM   Result Value Ref Range

## 2020-04-24 NOTE — PROGRESS NOTES
IR Note    Attempted lumbar puncture on patient, but the patient could not tolerate it and was moving during the procedure. Therefore, the procedure was aborted. We can reattempt the LP with general anesthesia.     Randall Encinas MD  Interventional Radiology

## 2020-04-24 NOTE — PROGRESS NOTES
Hospitalist Progress Note  4/23/2020 11:27 PM  Subjective:   Admit Date: 4/20/2020  PCP: Olamide Sage MD  Interval History: pt is still confused  Valentine was removed  Could not pass the urine  Valentine is put back in  In restrains  Agitated at times  At risk for him self and staff  Last night events noted  Chart reviewed. Diet: DIET GENERAL;  Medications:   Scheduled Meds:   sodium chloride flush  10 mL Intravenous 2 times per day    enoxaparin  40 mg Subcutaneous Daily    aspirin  81 mg Oral Daily    famotidine  20 mg Oral BID    lisinopril  20 mg Oral Daily    tamsulosin  0.4 mg Oral Nightly     Continuous Infusions:  CBC:   Recent Labs     04/21/20  0326 04/23/20  0409   WBC 7.4 8.4   HGB 14.0 15.8    259     BMP:    Recent Labs     04/23/20  0409      K 3.9      CO2 25   BUN 17   CREATININE 1.0   GLUCOSE 97     Hepatic:   Recent Labs     04/23/20  0409   AST 26   ALT 38   BILITOT 1.6*   ALKPHOS 117     Troponin:   Recent Labs     04/21/20  0326 04/21/20  1032   TROPONINI 0.02* 0.02*     BNP: No results for input(s): BNP in the last 72 hours. Lipids:   Recent Labs     04/23/20  0409   CHOL 181   HDL 41     INR: No results for input(s): INR in the last 72 hours. Objective:   Vitals: /76   Pulse 91   Temp 98 °F (36.7 °C) (Oral)   Resp 18   Ht 6' (1.829 m)   Wt 217 lb 6 oz (98.6 kg)   SpO2 98%   BMI 29.48 kg/m²   General appearance: alert,awake  HEENT: Head: Normal, normocephalic, atraumatic, anicteric, pupils are reactive to light. Dry mucous membrane.   Neck: no adenopathy, no carotid bruit, supple, symmetrical, trachea midline and thyroid not enlarged, symmetric, no tenderness/mass/nodules  Lungs: clear to auscultation bilaterally  Heart: regular rate and rhythm, S1, S2 normal, no murmur, click, rub or gallop  Abdomen: soft, non-tender; bowel sounds normal; no masses,  no organomegaly  Extremities: extremities normal, atraumatic, no cyanosis or edema  Skin -as in nursing notes  Neurologic: Mental status: Alert,     Assessment and Plan:   1. CVA  2. Urinary retention  3. Agitation-psych consult    Patient Active Problem List:     Complete tear of right rotator cuff     Elevated troponin     Altered mental status     Frequent falls     Hypertension     Cerebrovascular accident (CVA) ([address]    Pt is stable. Discussed with staff and pt about the plan. See the orders for further plan. Will proceed further acc to pt's progress.   Time spent with management of the pt is-20 minutes  D/w girlfriend  She is is going to give info about son and daughter  [address] to her daughter is not in talking to the pt    Electronically signed by: Vicente Hodges MD, on 4/23/2020, at 11:27 PM

## 2020-04-24 NOTE — PROGRESS NOTES
4/21/20  Motion limited.    Acute L parietal lobe infarct.       CTA head/neck 4/21/20  Limited due to motion.    No definitive focal narrowing in the head, however distal branches difficult to assess.    No focal significant narrowing in the neck.       TTE 4/23/20  Overall left ventricular function is normal.    Normal left atrium. No bubble study due to sub-optimal imaging and poor patient compliance. Impression  1. Subacute change in mental status, ongoing  Unclear etiology. May be related to substance abuse/misuse, though w/out any improvement. MRI brain identified an acute infarct in the L parietal region, which is unlikely to be the cause of his encephalopathy. EEG w/ slowing. 2.  Multiple reported falls/RLE weakness.    3.  + urine drug screen.    4.  HTN. Recommendations  1. Due for CSF this morning. 2.  Await Psychiatry input. 3.  Stroke prevention measures - antiplatelet, statin, BP control, normoglycemia. 4.  EMG outpatient. Not tolerating lumbar MRI. Will follow up on CSF as it becomes available and address as necessary. Please call over the weekend if urgently needed. Thank you. ADDENDUM 1320  LP unable to be safely completed w/ benzodiazepine administration. Anesthesia to assist w/ completion this afternoon.       Josie Harrison NP  [address] [place] [address] [address] Neurology    A copy of this note was provided for Dr Mayelin Velázquez MD

## 2020-04-24 NOTE — CARE COORDINATION
kathrine, Sri Edward rturned call. She reports her father was  to her step mother who is a Nurse Practitioner and she is not sure they are legally . She will attempt to reach her but she reports they do not have a good relationship. STEVE informed her [facility] [address] requires a lineage of who might be able to make decisions for him. She will call me back with this information.   [place], [place]     Case Management   [phone]    4/24/2020  1:48 PM

## 2020-04-24 NOTE — BRIEF OP NOTE
Brief Postoperative Note    Yonas Serrato  YOB: 1965  [id number]    Pre-operative Diagnosis: encephalopathy    Post-operative Diagnosis: Same    Procedure: lumbar puncture    Anesthesia: General    Surgeons: Tyrone Pierre MD    Estimated Blood Loss: Less than 5 mL    Complications: None    Specimens: Was Obtained: 1 cc of CSF    Findings: Access was gained into the subarachnoid space however only 1 cc of CSF was able to be obtained.      Electronically signed by Tyrone Pierre MD on 4/24/2020 at 3:45 PM

## 2020-04-24 NOTE — ANESTHESIA POSTPROCEDURE EVALUATION
Department of Anesthesiology  Postprocedure Note    Patient: Pedro Luis Reyes  MRN: [id number]  YOB: 1965  Date of evaluation: 4/24/2020  Time:  5:25 PM     Procedure Summary     Date:  04/24/20 Room / Location:  [facility] Special Procedures    Anesthesia Start:  2997 Anesthesia Stop:  9188    Procedure:  IR LUMBAR PUNCTURE FOR DIAGNOSIS Diagnosis:  (encephalitis)    Scheduled Providers:  Kyra Interventionalist1 Responsible Provider:  Yasir Kinsey MD    Anesthesia Type:  MAC ASA Status:  3          Anesthesia Type: MAC    Mckenzie Phase I:      Mckenzie Phase II:      Last vitals: Reviewed and per EMR flowsheets.        Anesthesia Post Evaluation    Patient location during evaluation: PACU  Level of consciousness: awake and alert  Airway patency: patent  Nausea & Vomiting: no nausea and no vomiting  Complications: no  Cardiovascular status: blood pressure returned to baseline  Respiratory status: acceptable  Hydration status: euvolemic  Comments: Postoperative Anesthesia Note    Name:    Pedro Luis Reyes  MRN:      [id number]    Patient Vitals in the past 12 hrs:  04/24/20 1644, BP:116/66, Pulse:93  04/24/20 1612, BP:104/83, Pulse:86  04/24/20 1557, BP:(!) 108/51, Temp:98 °F (36.7 °C), Temp src:Axillary, Pulse:85, Resp:20, SpO2:100 %  04/24/20 1157, BP:121/85, Temp:98.6 °F (37 °C), Temp src:Axillary, Pulse:105, Resp:18, SpO2:95 %  04/24/20 0841, BP:121/75, Temp:98.2 °F (36.8 °C), Temp src:Axillary, Pulse:96, Resp:18, SpO2:97 %  04/24/20 0834, SpO2:97 %  04/24/20 0533, Weight:216 lb 0.8 oz (98 kg)     LABS:    CBC  Lab Results       Component                Value               Date/Time                  WBC                      8.4                 04/23/2020 04:09 AM        HGB                      15.8                04/23/2020 04:09 AM        HCT                      47.1                04/23/2020 04:09 AM        PLT                      259                 04/23/2020 04:09 AM   RENAL  Lab

## 2020-04-24 NOTE — PROGRESS NOTES
RN. Pt confused in bed. Soft bilateral wrist restraints remain in place. Bed alarm on.      Electronically signed by Swetha Joel RN on 4/24/2020 at 7:17 PM

## 2020-04-25 LAB
A/G RATIO: 1.4 (ref 1.1–2.2)
ALBUMIN SERPL-MCNC: 3.9 G/DL (ref 3.4–5)
ALP BLD-CCNC: 103 U/L (ref 40–129)
ALT SERPL-CCNC: 54 U/L (ref 10–40)
ANION GAP SERPL CALCULATED.3IONS-SCNC: 14 MMOL/L (ref 3–16)
AST SERPL-CCNC: 32 U/L (ref 15–37)
BILIRUB SERPL-MCNC: 0.8 MG/DL (ref 0–1)
BUN BLDV-MCNC: 28 MG/DL (ref 7–20)
CALCIUM SERPL-MCNC: 8.9 MG/DL (ref 8.3–10.6)
CHLORIDE BLD-SCNC: 107 MMOL/L (ref 99–110)
CO2: 22 MMOL/L (ref 21–32)
CREAT SERPL-MCNC: 1.3 MG/DL (ref 0.9–1.3)
GFR AFRICAN AMERICAN: >60
GFR NON-AFRICAN AMERICAN: 58
GLOBULIN: 2.7 G/DL
GLUCOSE BLD-MCNC: 111 MG/DL (ref 70–99)
HCT VFR BLD CALC: 46.6 % (ref 40.5–52.5)
HEMOGLOBIN: 15.7 G/DL (ref 13.5–17.5)
MCH RBC QN AUTO: 31.3 PG (ref 26–34)
MCHC RBC AUTO-ENTMCNC: 33.6 G/DL (ref 31–36)
MCV RBC AUTO: 93 FL (ref 80–100)
PDW BLD-RTO: 15.2 % (ref 12.4–15.4)
PLATELET # BLD: 287 K/UL (ref 135–450)
PMV BLD AUTO: 9.1 FL (ref 5–10.5)
POTASSIUM SERPL-SCNC: 4 MMOL/L (ref 3.5–5.1)
RBC # BLD: 5.01 M/UL (ref 4.2–5.9)
SODIUM BLD-SCNC: 143 MMOL/L (ref 136–145)
TOTAL PROTEIN: 6.6 G/DL (ref 6.4–8.2)
WBC # BLD: 8.4 K/UL (ref 4–11)

## 2020-04-25 PROCEDURE — 99232 SBSQ HOSP IP/OBS MODERATE 35: CPT | Performed by: INTERNAL MEDICINE

## 2020-04-25 PROCEDURE — 2580000003 HC RX 258: Performed by: INTERNAL MEDICINE

## 2020-04-25 PROCEDURE — 85027 COMPLETE CBC AUTOMATED: CPT

## 2020-04-25 PROCEDURE — 2140000000 HC CCU INTERMEDIATE R&B

## 2020-04-25 PROCEDURE — 6360000002 HC RX W HCPCS: Performed by: INTERNAL MEDICINE

## 2020-04-25 PROCEDURE — 36415 COLL VENOUS BLD VENIPUNCTURE: CPT

## 2020-04-25 PROCEDURE — 97530 THERAPEUTIC ACTIVITIES: CPT

## 2020-04-25 PROCEDURE — 97110 THERAPEUTIC EXERCISES: CPT

## 2020-04-25 PROCEDURE — 94760 N-INVAS EAR/PLS OXIMETRY 1: CPT

## 2020-04-25 PROCEDURE — 6370000000 HC RX 637 (ALT 250 FOR IP): Performed by: INTERNAL MEDICINE

## 2020-04-25 PROCEDURE — 80053 COMPREHEN METABOLIC PANEL: CPT

## 2020-04-25 RX ADMIN — TAMSULOSIN HYDROCHLORIDE 0.4 MG: 0.4 CAPSULE ORAL at 20:27

## 2020-04-25 RX ADMIN — SODIUM CHLORIDE: 9 INJECTION, SOLUTION INTRAVENOUS at 08:06

## 2020-04-25 RX ADMIN — ACETAMINOPHEN 650 MG: 325 TABLET, FILM COATED ORAL at 20:27

## 2020-04-25 RX ADMIN — FAMOTIDINE 20 MG: 20 TABLET ORAL at 08:20

## 2020-04-25 RX ADMIN — SODIUM CHLORIDE: 9 INJECTION, SOLUTION INTRAVENOUS at 15:55

## 2020-04-25 RX ADMIN — LISINOPRIL 10 MG: 10 TABLET ORAL at 08:20

## 2020-04-25 RX ADMIN — ENOXAPARIN SODIUM 40 MG: 40 INJECTION SUBCUTANEOUS at 08:22

## 2020-04-25 RX ADMIN — FAMOTIDINE 20 MG: 20 TABLET ORAL at 20:27

## 2020-04-25 RX ADMIN — QUETIAPINE FUMARATE 25 MG: 25 TABLET ORAL at 22:30

## 2020-04-25 RX ADMIN — ASPIRIN 81 MG: 81 TABLET, COATED ORAL at 08:21

## 2020-04-25 RX ADMIN — QUETIAPINE FUMARATE 25 MG: 25 TABLET ORAL at 08:20

## 2020-04-25 RX ADMIN — QUETIAPINE FUMARATE 25 MG: 25 TABLET ORAL at 12:57

## 2020-04-25 ASSESSMENT — PAIN SCALES - GENERAL
PAINLEVEL_OUTOF10: 0
PAINLEVEL_OUTOF10: 2
PAINLEVEL_OUTOF10: 0
PAINLEVEL_OUTOF10: 0

## 2020-04-25 NOTE — PROGRESS NOTES
Late Entry:  @ 0730 Bedside handoff with Malinda Olivier RN. Pt resting quietly in bed, soft bilateral wrist restraints in place. @ 0815 Assessment completed. Pt opens eyes to name. Nods head yes when asked does he go by [facility]. Pt does not verbally answer any orientation questions. Pt does not follow commands. Pt restless/fidgety in bed when awakened. @ [phone] Pt flinch/restless and cursing after administration of SQ Lovenox. Then quiets/relaxes after couple minutes without stimlation. Orientation provided for time/place/situation. @ 6422 Dr Lawrence Mcdowell at bedside for primary care rounds. Status reviewed, questions answered, plan of care discussed. @ 0930 Pt fed breakfast without difficulty, consumed 100%. Pt not answering questions, but talking to self nonsensical subject no related to questions from staff. Pt does not follow commands/direction except eating/drinking. TV on and window shades open for time orientation. @ 8816 PT to bedside for therapy, see notes. Pt not following commands/directions. Pt with eyes closed most of time. Pt in chair position in bed at end of therapy. Orientation provided for time/place/situation. @ 2905 Pt's shruthi called as lunch tray arrived. Held phone up to pt's ear. He responded to her saying \"I love you. \" Pt fed and consumed 100% of lunch tray. Pt rambling non-sense conversation during meal, both laughing and tearful at times. Pt talking about some cora, cursing and flipping his middle finger at times. Pt doesn't answer RN questions, no appropriate/related responses to staff conversation. @ 1315 Pt agitated, PRN Seroquel administered see eMAR. Pt incontinent of stool, pericare completed and bed linens changed. Valentine care completed. Orientation provided for time/place/situation. @ 1400 Pt sleeping quietly in bed at this time. @ 1600 Pt resting quietly, awakens, looks around and closes eyes again.  Sleep apnea noted when sleeping, HR 60 with apnea then increases 80-90s when awake.  @ [address] arrived, pt refusing food/drink. Shakes head no when offered. @ 1915 Bedside handoff with Nickie Vinson RN. Pt awake, not responding to questions. Bilateral soft restraints remain in place.

## 2020-04-25 NOTE — PROGRESS NOTES
Hospitalist Progress Note  4/25/2020 9:00 AM  Subjective:   Admit Date: 4/20/2020  PCP: Ike Skelton MD  Interval History: pt is still confused  Valentine is draining  In restrains  Agitated at times  At risk for him self and staff  Last night events noted  Chart reviewed. Diet: DIET GENERAL;  Medications:   Scheduled Meds:   lisinopril  10 mg Oral Daily    sodium chloride flush  10 mL Intravenous 2 times per day    enoxaparin  40 mg Subcutaneous Daily    aspirin  81 mg Oral Daily    famotidine  20 mg Oral BID    tamsulosin  0.4 mg Oral Nightly     Continuous Infusions:   sodium chloride 125 mL/hr at 04/25/20 0806     CBC:   Recent Labs     04/23/20 0409 04/25/20  0407   WBC 8.4 8.4   HGB 15.8 15.7    287     BMP:    Recent Labs     04/23/20  0409 04/25/20  0407    143   K 3.9 4.0    107   CO2 25 22   BUN 17 28*   CREATININE 1.0 1.3   GLUCOSE 97 111*     Hepatic:   Recent Labs     04/23/20 0409 04/25/20  0407   AST 26 32   ALT 38 54*   BILITOT 1.6* 0.8   ALKPHOS 117 103     Troponin:   No results for input(s): TROPONINI in the last 72 hours. BNP: No results for input(s): BNP in the last 72 hours. Lipids:   Recent Labs     04/23/20  0409   CHOL 181   HDL 41     INR:   Recent Labs     04/24/20  0352   INR 1.03       Objective:   Vitals: /81   Pulse 77   Temp 98.2 °F (36.8 °C) (Axillary)   Resp 14   Ht 6' (1.829 m)   Wt 216 lb 4.3 oz (98.1 kg)   SpO2 95%   BMI 29.33 kg/m²   General appearance:alert,awake  ,HEENT: Head: Normal, normocephalic, atraumatic, anicteric, pupils are reactive to light. Dry mucous membrane.   Neck: no adenopathy, no carotid bruit, supple, symmetrical, trachea midline and thyroid not enlarged, symmetric, no tenderness/mass/nodules  Lungs: clear to auscultation bilaterally  Heart: regular rate and rhythm, S1, S2 normal, no murmur, click, rub or gallop  Abdomen: soft, non-tender; bowel sounds normal; no masses,  no organomegaly  Extremities: extremities normal, atraumatic, no cyanosis or edema  Neurologic: Mental status: alert and awake    Assessment and Plan:   1. CVA  2. Urinary retention  3. Agitation-psych consult noted  4. Metabolic encephalopathy-    Patient Active Problem List:     Complete tear of right rotator cuff     Elevated troponin     Altered mental status     Frequent falls     Hypertension     Cerebrovascular accident (CVA) ([facility]    Pt is stable. Discussed with staff and pt about the plan. See the orders for further plan. Will proceed further acc to pt's progress.   Time spent with management of the pt is-20 minutes      Electronically signed by: María Elena Nathan MD, on 4/25/2020, at 9:00 AM

## 2020-04-25 NOTE — PROGRESS NOTES
has a past medical history of ADHD, Arthritis, Bipolar 1 disorder ([facility] [address] Depression, Hypertension, and Sleep apnea. has a past surgical history that includes shoulder surgery; Leg Surgery (Right); fracture surgery (Right); and Shoulder arthroscopy (Right, 11/8/2019). Restrictions  Restrictions/Precautions  Restrictions/Precautions: Fall Risk  Subjective   General  Chart Reviewed: Yes  Additional Pertinent Hx: 49 y/o male admit 4/20/2020 with Altered MS, Frequent Falls. Tox Screen + Amphetamine, Benzo and Cannabinoid. MRI + Punctate Acute Infarct involving L Parietal Lobe.  4/24/2020 Lumbar Puncture completed. PMH as noted including R Shld Surg (x 4), (11/2019, Dr David Smith; noncompliant Out-Pt PT); R LE Fx/Surg; Bipolar; Sleep Apnea. Per discussion with RN 6-, patient with fall within past 10 days, hitting his head (wound at left top of scalp), and family reporting Karol Fritz has not been right since. \"  Response To Previous Treatment: Patient unable to report, no changes reported from family or staff  Family / Caregiver Present: No  Referring Practitioner: Dr. Alvaro Santos  Subjective  Subjective: Pt agreeable to PT. General Comment  Comments: Pt cont in bed; eyes generally closed although opens them although never direct eye contact. Follows very few simple commands, delayed. Orientation  Orientation  Overall Orientation Status: Impaired  Orientation Level: Oriented to person;Disoriented to place; Disoriented to time;Disoriented to situation  Cognition   Cognition  Overall Cognitive Status: Exceptions  Arousal/Alertness: Delayed responses to stimuli  Following Commands: Inconsistently follows commands  Attention Span: Attends with cues to redirect  Memory: Decreased recall of biographical Information;Decreased recall of recent events  Safety Judgement: Decreased awareness of need for assistance  Problem Solving: Assistance required to correct errors made;Assistance required to generate

## 2020-04-25 NOTE — PLAN OF CARE
infection. Monitor quality and quantity of urine output. Stat lock in place, drainage bag hanging below level of bladder, routine catheter care. Urology consulted       Problem:   Goal: Adequate urinary output  Outcome: Ongoing  Note: A: Strict I/O, daily weight, assess for edema. Urology consulted. Goal: No urinary complication  Outcome: Ongoing     Problem: Skin Integrity:  Goal: Will show no infection signs and symptoms  Description: Will show no infection signs and symptoms  Outcome: Ongoing  Goal: Absence of new skin breakdown  Description: Absence of new skin breakdown  Outcome: Ongoing  Note: A: Low air loss/alternating pressure specialty mattress, assist with turn/repositioning, incontinence care, off loading pressure areas, Nutritional consult, daily weight, I/O, monitor meal intake.

## 2020-04-26 ENCOUNTER — APPOINTMENT (OUTPATIENT)
Dept: GENERAL RADIOLOGY | Age: 55
DRG: 045 | End: 2020-04-26
Payer: COMMERCIAL

## 2020-04-26 PROCEDURE — 99233 SBSQ HOSP IP/OBS HIGH 50: CPT | Performed by: INTERNAL MEDICINE

## 2020-04-26 PROCEDURE — 6370000000 HC RX 637 (ALT 250 FOR IP): Performed by: INTERNAL MEDICINE

## 2020-04-26 PROCEDURE — 2580000003 HC RX 258: Performed by: INTERNAL MEDICINE

## 2020-04-26 PROCEDURE — 72100 X-RAY EXAM L-S SPINE 2/3 VWS: CPT

## 2020-04-26 PROCEDURE — 73502 X-RAY EXAM HIP UNI 2-3 VIEWS: CPT

## 2020-04-26 PROCEDURE — 2140000000 HC CCU INTERMEDIATE R&B

## 2020-04-26 PROCEDURE — 6360000002 HC RX W HCPCS: Performed by: INTERNAL MEDICINE

## 2020-04-26 RX ADMIN — FAMOTIDINE 20 MG: 20 TABLET ORAL at 22:23

## 2020-04-26 RX ADMIN — TAMSULOSIN HYDROCHLORIDE 0.4 MG: 0.4 CAPSULE ORAL at 22:23

## 2020-04-26 RX ADMIN — QUETIAPINE FUMARATE 25 MG: 25 TABLET ORAL at 12:17

## 2020-04-26 RX ADMIN — LISINOPRIL 10 MG: 10 TABLET ORAL at 08:37

## 2020-04-26 RX ADMIN — QUETIAPINE FUMARATE 25 MG: 25 TABLET ORAL at 22:23

## 2020-04-26 RX ADMIN — SODIUM CHLORIDE, PRESERVATIVE FREE 10 ML: 5 INJECTION INTRAVENOUS at 08:32

## 2020-04-26 RX ADMIN — SODIUM CHLORIDE: 9 INJECTION, SOLUTION INTRAVENOUS at 15:14

## 2020-04-26 RX ADMIN — QUETIAPINE FUMARATE 25 MG: 25 TABLET ORAL at 16:58

## 2020-04-26 RX ADMIN — ASPIRIN 81 MG: 81 TABLET, COATED ORAL at 08:37

## 2020-04-26 RX ADMIN — SODIUM CHLORIDE: 9 INJECTION, SOLUTION INTRAVENOUS at 07:33

## 2020-04-26 RX ADMIN — SODIUM CHLORIDE: 9 INJECTION, SOLUTION INTRAVENOUS at 23:50

## 2020-04-26 RX ADMIN — ENOXAPARIN SODIUM 40 MG: 40 INJECTION SUBCUTANEOUS at 08:32

## 2020-04-26 RX ADMIN — QUETIAPINE FUMARATE 25 MG: 25 TABLET ORAL at 08:37

## 2020-04-26 RX ADMIN — FAMOTIDINE 20 MG: 20 TABLET ORAL at 08:37

## 2020-04-26 RX ADMIN — SODIUM CHLORIDE: 9 INJECTION, SOLUTION INTRAVENOUS at 00:26

## 2020-04-26 ASSESSMENT — PAIN SCALES - WONG BAKER
WONGBAKER_NUMERICALRESPONSE: 0

## 2020-04-26 ASSESSMENT — PAIN SCALES - GENERAL
PAINLEVEL_OUTOF10: 0

## 2020-04-27 PROBLEM — G93.41 METABOLIC ENCEPHALOPATHY: Status: ACTIVE | Noted: 2020-04-27

## 2020-04-27 LAB
A/G RATIO: 1.5 (ref 1.1–2.2)
ALBUMIN SERPL-MCNC: 3.7 G/DL (ref 3.4–5)
ALP BLD-CCNC: 88 U/L (ref 40–129)
ALT SERPL-CCNC: 31 U/L (ref 10–40)
ANION GAP SERPL CALCULATED.3IONS-SCNC: 14 MMOL/L (ref 3–16)
AST SERPL-CCNC: 18 U/L (ref 15–37)
BILIRUB SERPL-MCNC: 0.9 MG/DL (ref 0–1)
BUN BLDV-MCNC: 12 MG/DL (ref 7–20)
CALCIUM SERPL-MCNC: 8.7 MG/DL (ref 8.3–10.6)
CHLORIDE BLD-SCNC: 106 MMOL/L (ref 99–110)
CO2: 20 MMOL/L (ref 21–32)
CREAT SERPL-MCNC: 0.7 MG/DL (ref 0.9–1.3)
GFR AFRICAN AMERICAN: >60
GFR NON-AFRICAN AMERICAN: >60
GLOBULIN: 2.4 G/DL
GLUCOSE BLD-MCNC: 91 MG/DL (ref 70–99)
HCT VFR BLD CALC: 38 % (ref 40.5–52.5)
HEMOGLOBIN: 13.1 G/DL (ref 13.5–17.5)
MCH RBC QN AUTO: 31.5 PG (ref 26–34)
MCHC RBC AUTO-ENTMCNC: 34.4 G/DL (ref 31–36)
MCV RBC AUTO: 91.6 FL (ref 80–100)
PDW BLD-RTO: 15 % (ref 12.4–15.4)
PLATELET # BLD: 217 K/UL (ref 135–450)
PMV BLD AUTO: 9.3 FL (ref 5–10.5)
POTASSIUM SERPL-SCNC: 3.8 MMOL/L (ref 3.5–5.1)
RBC # BLD: 4.15 M/UL (ref 4.2–5.9)
SODIUM BLD-SCNC: 140 MMOL/L (ref 136–145)
TOTAL PROTEIN: 6.1 G/DL (ref 6.4–8.2)
WBC # BLD: 5.7 K/UL (ref 4–11)

## 2020-04-27 PROCEDURE — 85027 COMPLETE CBC AUTOMATED: CPT

## 2020-04-27 PROCEDURE — 80053 COMPREHEN METABOLIC PANEL: CPT

## 2020-04-27 PROCEDURE — 99238 HOSP IP/OBS DSCHRG MGMT 30/<: CPT | Performed by: INTERNAL MEDICINE

## 2020-04-27 PROCEDURE — 2580000003 HC RX 258: Performed by: INTERNAL MEDICINE

## 2020-04-27 PROCEDURE — 6360000002 HC RX W HCPCS: Performed by: INTERNAL MEDICINE

## 2020-04-27 PROCEDURE — 94760 N-INVAS EAR/PLS OXIMETRY 1: CPT

## 2020-04-27 PROCEDURE — 6370000000 HC RX 637 (ALT 250 FOR IP): Performed by: INTERNAL MEDICINE

## 2020-04-27 PROCEDURE — 36415 COLL VENOUS BLD VENIPUNCTURE: CPT

## 2020-04-27 PROCEDURE — 2140000000 HC CCU INTERMEDIATE R&B

## 2020-04-27 RX ORDER — ATORVASTATIN CALCIUM 10 MG/1
10 TABLET, FILM COATED ORAL DAILY
Qty: 30 TABLET | Refills: 3 | Status: ON HOLD
Start: 2020-04-27 | End: 2020-05-08 | Stop reason: HOSPADM

## 2020-04-27 RX ORDER — LISINOPRIL 10 MG/1
10 TABLET ORAL DAILY
Qty: 30 TABLET | Refills: 3 | Status: ON HOLD | OUTPATIENT
Start: 2020-04-28 | End: 2020-05-08 | Stop reason: SDUPTHER

## 2020-04-27 RX ORDER — QUETIAPINE FUMARATE 25 MG/1
25 TABLET, FILM COATED ORAL 4 TIMES DAILY PRN
Qty: 60 TABLET | Refills: 3 | Status: ON HOLD | OUTPATIENT
Start: 2020-04-27 | End: 2020-05-02 | Stop reason: SDUPTHER

## 2020-04-27 RX ORDER — POLYETHYLENE GLYCOL 3350 17 G/17G
17 POWDER, FOR SOLUTION ORAL DAILY PRN
Qty: 527 G | Refills: 1 | Status: ON HOLD | OUTPATIENT
Start: 2020-04-27 | End: 2020-05-08 | Stop reason: SDUPTHER

## 2020-04-27 RX ORDER — ASPIRIN 81 MG/1
81 TABLET ORAL DAILY
Qty: 30 TABLET | Refills: 3 | Status: SHIPPED | OUTPATIENT
Start: 2020-04-28 | End: 2021-11-22 | Stop reason: CLARIF

## 2020-04-27 RX ORDER — ONDANSETRON 2 MG/ML
4 INJECTION INTRAMUSCULAR; INTRAVENOUS EVERY 6 HOURS PRN
Qty: 84 ML | Refills: 0 | Status: ON HOLD
Start: 2020-04-27 | End: 2020-05-02 | Stop reason: HOSPADM

## 2020-04-27 RX ORDER — FAMOTIDINE 20 MG/1
20 TABLET, FILM COATED ORAL 2 TIMES DAILY
Qty: 60 TABLET | Refills: 3 | Status: ON HOLD | OUTPATIENT
Start: 2020-04-27 | End: 2020-05-08 | Stop reason: SDUPTHER

## 2020-04-27 RX ORDER — TAMSULOSIN HYDROCHLORIDE 0.4 MG/1
0.4 CAPSULE ORAL NIGHTLY
Qty: 30 CAPSULE | Refills: 3 | Status: SHIPPED | OUTPATIENT
Start: 2020-04-27 | End: 2021-11-08 | Stop reason: SDUPTHER

## 2020-04-27 RX ADMIN — QUETIAPINE FUMARATE 25 MG: 25 TABLET ORAL at 11:45

## 2020-04-27 RX ADMIN — ACETAMINOPHEN 650 MG: 325 TABLET, FILM COATED ORAL at 11:45

## 2020-04-27 RX ADMIN — QUETIAPINE FUMARATE 25 MG: 25 TABLET ORAL at 20:47

## 2020-04-27 RX ADMIN — SODIUM CHLORIDE, PRESERVATIVE FREE 10 ML: 5 INJECTION INTRAVENOUS at 20:47

## 2020-04-27 RX ADMIN — ASPIRIN 81 MG: 81 TABLET, COATED ORAL at 08:17

## 2020-04-27 RX ADMIN — FAMOTIDINE 20 MG: 20 TABLET ORAL at 08:17

## 2020-04-27 RX ADMIN — ENOXAPARIN SODIUM 40 MG: 40 INJECTION SUBCUTANEOUS at 08:57

## 2020-04-27 RX ADMIN — TAMSULOSIN HYDROCHLORIDE 0.4 MG: 0.4 CAPSULE ORAL at 20:47

## 2020-04-27 RX ADMIN — FAMOTIDINE 20 MG: 20 TABLET ORAL at 20:47

## 2020-04-27 RX ADMIN — LISINOPRIL 10 MG: 10 TABLET ORAL at 08:17

## 2020-04-27 ASSESSMENT — PAIN SCALES - WONG BAKER
WONGBAKER_NUMERICALRESPONSE: 0
WONGBAKER_NUMERICALRESPONSE: 0
WONGBAKER_NUMERICALRESPONSE: 4
WONGBAKER_NUMERICALRESPONSE: 0

## 2020-04-27 NOTE — PROGRESS NOTES
Late Entry:  @ 0715 Bedside handoff with Zena Pablo RN. Pt sleeping quietly in bed at this time. @ 7018 Assessment completed. Pt opens eyes to name, then closes eyes again. Pt does not answer orientation questions. Pt took PO medication and eating breakfast being fed with eyes closed. Pt with rambling speech, not interactive speech with RN. Orientation to time/place/situation provided; window blinds up, room lights and TV on for time of day orientation. Pt periodically grunts and growls. @ 0900 Pt consumed >75% of large breakfast tray. Pt taking PO fluids. SQ lovenox injection administered with verbal instruction with each step, pt flinched and tensed up with wipe of alcohol wipe and injection. Pt relaxed afterwards. @ 7253 Pt's fiancee/girlfriend, Marcelle Oskar, called RN for updates. Questions answered. @ 0658 Dr Yarelis De at bedside for primary care rounds. Status reviewed, questions answered, plan of care discussed. @ 1025 Pt resting quietly. Offered PO fluids. Pt shakes head and won't open eyes, squints eyes closed tightly when RN speaking to him. @ Sara Holman  78., CNP at bedside for neurology rounds. Status reviewed, questions answered, plan of care discussed. States that he has spoken with Dr Yarelis De today to discussed plan of care. @ 1104 Pt awake, grunting and growling. When RN asking questions, pt looks at RN, no direct verbal response, some non-related rambling conversation at times. Pt does not follow commands or any directions. @ 1150 Pt awake, restless in bed, reaching for joselyn area; soft wrist restraints remain in place. Pt grunting, growling, winking at staff. Pt with clear, random, rambling conversation. He did answer one orientation question after multiple attempts, stating his birthday was in [birth year]. Frequently showing pt pictures brought in by fibrannon/girlfriend, RN asking him who is in the pictures. Pt smiles, laughs but doesn't not state any names.  Pt grimacing when asked if having any pain. PRN Tylenol and Seroquel administered, see eMAR  @ 1300 Pt resting in bed quietly, awakens to name. Does not answer orientation questions or follow commands. Pt rambles in clear voice, RN unable to follow rambling conversation. No eye contact when eyes open. @ 1407 Valentine catheter removed. Pt tolerated but reached for kristan area after removed. Depends in place. @ 1540 Dr Rafi Lema and Yanira Baptiste, CNP at bedside for neurology. They plan to transfer pt to [facility] for continued EEG monitoring. 3 attempts to call Dr Yeh Maker office, call gets disconnected. Call placed to Pt's mom and son Edinson Farrar, both give permission to transfer. Call placed to daughter Frances Garzon full. Call placed to daughter Sofia Velázquez, left voicemail. Call placed to oldest daughter Eliz Erazo, left voicemail and she returned call. She gives permission and states will follow up with her sisters. @ 0835 Daughter Kristan Castanon returned call and gives permission to transfer. @ 0774 Attempted to call pt's fiancee/girlfriend, left voicemail. @ 1 Obtained Dr Booth's cell phone number and informed of neurology plan of care. She states spoke with neurology and ok with transfer. She does not round at [facility], hospitalist and neurology will follow patient upon transfer. Dr Quigley Keep to call transfer center to initiate process. @ 1700 Pt fed supper and consumed >75% of supper and 100% of supplement. Pt incontinent large amount urine, pericare completed. Pt rambling confused conversation during meal, singing, laughing and crying. Pt periodically makes different facial expressions, winking and odd noises. Pt does not answer any RN questions appropriately. @ [phone] Pt's fiancee/girlfriend, Cleveland Arshad, returned call. Updated on orders to transfer to [facility]. She reports having found some POA papers, plans to drop copy off at hospital for chart. @ [phone] Received call from transfer center. Pt room at [facility] will be 4455 bed 1, waiting for transport time.  Pt's

## 2020-04-27 NOTE — PROGRESS NOTES
Nutrition Assessment    Type and Reason for Visit: Initial(los)    Nutrition Recommendations: Add Ensure Enlive at dinner  Continue to feed pt as needed to ensure adequacy    Nutrition Assessment: Pt with pmh; HTN, bipolar, ADHD, admitted r/t altered mental status & recent increase in falls. Found to have CVA & what appears to be per Psych, Delerium Encephalopathy. Noted that pt tested positive for drugs. Diet advanced to general. Intake very good, but pt requires feeding at this time. Noted significant wt loss over the past 3 months. Will add supplementation bid to start. Malnutrition Assessment:  · Malnutrition Status: Insufficient data  Due to current CDC guidelines recommending 6-ft distancing for social isolation for COVID19 prevention, NFPE/malnutrition assessment was deferred at this time. Nutrition Risk Level: Moderate    Nutrient Needs:  · Estimated Daily Total Kcal: 5020-7441 (15-18 x  kg)  · Estimated Daily Protein (g):  (1.2-2 x ABW 81 kg)  · Estimated Daily Total Fluid (ml/day): 1 ml per kcal    Nutrition Diagnosis:   · Problem: Predicted suboptimal energy intake  · Etiology: related to Cognitive or neurological impairment     Signs and symptoms:  as evidenced by Weight loss    Objective Information:  · Nutrition-Focused Physical Findings: BM 4/26. Noted no edema.   · Wound Type: (Noted traumatic wound to head & right knee)  · Current Nutrition Therapies:  · Oral Diet Orders: General   · Oral Diet intake: %  · Anthropometric Measures:  · Ht: 6' (182.9 cm)   · Current Body Wt: 224 lb (101.6 kg)  · Admission Body Wt: 226 lb (102.5 kg)  · % Weight Change:  ,  8.5% loss over the past 3 months with wt on 1/27/20 at 244# 14.9 oz.  · Ideal Body Wt: 178 lb (80.7 kg), % Ideal Body    · BMI Classification: BMI 30.0 - 34.9 Obese Class I    Nutrition Interventions:   Continue current diet, Start ONS  Continued Inpatient Monitoring    Nutrition Evaluation:   · Evaluation: Goals set

## 2020-04-27 NOTE — PROGRESS NOTES
perflutren lipid microspheres (DEFINITY) injection 1.65 mg, 1.5 mL, Intravenous, ONCE PRN, Félix Vazquez DO    tamsulosin [facility]) capsule 0.4 mg, 0.4 mg, Oral, Nightly, Amberly Booth MD, 0.4 mg at 04/26/20 2223    Exam  Blood pressure (!) 116/101, pulse 71, temperature 99 °F (37.2 °C), temperature source Axillary, resp. rate 18, height 6' (1.829 m), weight 223 lb 8.7 oz (101.4 kg), SpO2 95 %. Constitutional                          Vital signs: BP, HR, and RR reviewed            General encephalopathic. No distress. Eyes: unable to visualize the fundi  Cardiovascular: pulses symmetric in all 4 extremities. Psychiatric: does not appear to be hallucinating. Neurologic  Mental status:   Orientation unable to assess due to encephalopathy. Attention poor              Language unable to assess due to encephalopathy. Comprehension not consistently following any commands at this time. Cranial nerves:   CN2: resists passive eyelid opening. CN 3,4,6: no forced gaze deviation. CN7: face symmetric  CN8: hearing seems OK. CN12: tongue protrusion reji. Strength: technically difficult exam.  Appears to be moving all four limbs. Unable to follow commands well. Sensory: withdraw no nailbed pressure in all four limbs. Cerebellar/coordination: finger nose finger reji. Currently unable to follow commands well. Tone: normal in all 4 extremities  Gait: deferred at this time due to encephalopathy. ROS - Unable to obtain from the patient at this time due to encephalopathy. Chart reviewed.     Labs  Glucose 91  Na 140  K 3.8  BUN 12  Creatinine 0.7  LFTs OK    WBC 5.7K  Hg 13.1  Platelets 612    LDL 94  HgA1c 5.3     TSH 2.48  Folate > 20  Vitamin B12 - 1071  Sed Rate 13  CRP 4.0  ADOLFO negative     Paraneoplastic panel - pending     UA no evidence of infection.    C.diff negative    ETOH none detected  Urine drug screen + amphetamines, + benzodiazepines, + cannabinoids    Studies  EEG 4/22/20  Mild/moderate background slowing and disorganization.      MRI brain w/o 4/21/20  Motion limited.    Acute L parietal lobe infarct.       CTA head/neck 4/21/20  Limited due to motion.    No definitive focal narrowing in the head, however distal branches difficult to assess.    No focal significant narrowing in the neck.       TTE 4/23/20  Overall left ventricular function is normal.    Normal left atrium.    No bubble study due to sub-optimal imaging and poor patient compliance. Impression  1. Encephalopathy, acute/subacute.    Unclear etiology. May be related to substance abuse/misuse, though still w/out any improvement. MRI brain identified an acute infarct in the L parietal region, which is unlikely to be the cause of his encephalopathy.  EEG w/ slowing. CSF attempted last week, though IR could only obtain 1 cc.    2.  + urine drug screen.    3.  Multiple reported falls/RLE weakness.      Recommendations  1. May need to re-attempt CSF/EEG. Will discuss. 2.  Stroke prevention measures - antiplatelet, statin, BP control, normoglycemia. 3.  Psych following as well. 4.  EMG outpatient. Not tolerating MRI.       ADDENDUM 1520  Seen w/ attending Neurologist.  Transfer for cvEEG to r/o complex partial status and possible CSF    Yanira Baptiste NP  [address] [facility] [address] Neurology    A copy of this note was provided for Dr Angeli Graham MD

## 2020-04-27 NOTE — PROGRESS NOTES
Hospitalist Progress Note  4/27/2020 1:36 PM  Subjective:   Admit Date: 4/20/2020  PCP: Can Gipson MD  Interval History: pt is  Confused  No changes  Serge is draining  In restrains  Agitated at times  Eating well  At risk for him self and staff  Last night events noted  Chart reviewed. Diet: DIET GENERAL;  Dietary Nutrition Supplements: Standard High Calorie Oral Supplement  Medications:   Scheduled Meds:   lisinopril  10 mg Oral Daily    sodium chloride flush  10 mL Intravenous 2 times per day    enoxaparin  40 mg Subcutaneous Daily    aspirin  81 mg Oral Daily    famotidine  20 mg Oral BID    tamsulosin  0.4 mg Oral Nightly     Continuous Infusions:    CBC:   Recent Labs     04/25/20  0407 04/27/20  0648   WBC 8.4 5.7   HGB 15.7 13.1*    217     BMP:    Recent Labs     04/25/20  0407 04/27/20  0648    140   K 4.0 3.8    106   CO2 22 20*   BUN 28* 12   CREATININE 1.3 0.7*   GLUCOSE 111* 91     Hepatic:   Recent Labs     04/25/20  0407 04/27/20  0648   AST 32 18   ALT 54* 31   BILITOT 0.8 0.9   ALKPHOS 103 88     Troponin:   No results for input(s): TROPONINI in the last 72 hours. BNP: No results for input(s): BNP in the last 72 hours. Lipids:   No results for input(s): CHOL, HDL in the last 72 hours. Invalid input(s): LDLCALCU  INR:   No results for input(s): INR in the last 72 hours. Objective:   Vitals: /62   Pulse 59   Temp 99 °F (37.2 °C) (Axillary)   Resp 16   Ht 6' (1.829 m)   Wt 223 lb 8.7 oz (101.4 kg)   SpO2 95%   BMI 30.32 kg/m²   General appearance:alert,awake  ,HEENT: Head: Normal, normocephalic, atraumatic, anicteric, pupils are reactive to light. Dry mucous membrane.   Neck: no adenopathy, no carotid bruit, supple, symmetrical, trachea midline and thyroid not enlarged, symmetric, no tenderness/mass/nodules  Lungs: clear to auscultation bilaterally  Heart: regular rate and rhythm, S1, S2 normal, no murmur, click, rub or gallop  Abdomen: soft,

## 2020-04-27 NOTE — DISCHARGE INSTR - COC
8.7 oz (101.4 kg)   SpO2 95%   BMI 30.32 kg/m²     Last documented pain score (0-10 scale): Pain Level: 0  Last Weight:   Wt Readings from Last 1 Encounters:   04/26/20 223 lb 8.7 oz (101.4 kg)     Mental Status:  disoriented and unable to follow directions, no logical thought process/conversation    IV Access:  [address] [facility] IV ACCESS:[id number]}    Nursing Mobility/ADLs:  Walking   Dependent  Transfer  Dependent  Bathing  Dependent  Dressing  Dependent  Toileting  Dependent  Feeding  Dependent  Med Admin  Dependent  Med Delivery   whole    Wound Care Documentation and Therapy:  Wound 04/21/20 Head Left;Upper;Posterior (Active)   Wound Image   4/21/2020 10:30 AM   Wound Traumatic 4/27/2020  8:17 AM   Dressing Status Other (Comment) 4/27/2020  1:00 PM   Dressing Changed Changed/New 4/21/2020 10:48 AM   Dressing/Treatment Open to air 4/27/2020  1:00 PM   Wound Cleansed Other (Comment) 4/21/2020 10:48 AM   Wound Length (cm) 2 cm 4/21/2020 10:30 AM   Wound Width (cm) 2.5 cm 4/21/2020 10:30 AM   Wound Surface Area (cm^2) 5 cm^2 4/21/2020 10:30 AM   Wound Assessment Clean;Dry 4/25/2020  8:00 PM   Drainage Amount None 4/27/2020  1:00 PM   Odor None 4/27/2020  1:00 PM   Margins Attached edges 4/25/2020  8:00 PM   Kristan-wound Assessment Intact; Pink 4/27/2020  1:00 PM   Wound measurement (cm^2) 2.5 cm2 4/21/2020  7:55 AM   Number of days: 6       Wound 04/21/20 Knee Anterior;Right 0.8 cm x 0.6 cm  scabbed (Active)   Wound Traumatic 4/25/2020  8:00 PM   Dressing Status Other (Comment) 4/27/2020  1:00 PM   Dressing Changed Changed/New 4/21/2020  7:55 AM   Dressing/Treatment Open to air 4/27/2020  1:00 PM   Wound Cleansed Other (Comment) 4/21/2020  7:55 AM   Wound Length (cm) 0.8 cm 4/21/2020  7:55 AM   Wound Width (cm) 0.6 cm 4/21/2020  7:55 AM   Wound Surface Area (cm^2) 0.48 cm^2 4/21/2020  7:55 AM   Wound Assessment Clean;Dry; Intact 4/25/2020  8:00 PM   Drainage Amount None 4/27/2020  1:00 PM   Odor None 4/27/2020  1:00 PM Margins Attached edges 4/25/2020  8:00 PM   Kristan-wound Assessment Dry; Intact; Pink 4/21/2020  7:55 AM   Number of days: 6        Elimination:  Continence:   · Bowel: No  · Bladder: No  Urinary Catheter: Removal Date 4/27/2020   Colostomy/Ileostomy/Ileal Conduit: No       Date of Last BM: 4/26/2020    Intake/Output Summary (Last 24 hours) at 4/27/2020 1628  Last data filed at 4/27/2020 1522  Gross per 24 hour   Intake 3307 ml   Output 3125 ml   Net 182 ml     I/O last 3 completed shifts: In: 4069.7 [P.O.:710; I.V.:3359.7]  Out: 3325 [Urine:3325]    Safety Concerns:     History of Falls (last 30 days)    Impairments/Disabilities:      None    Nutrition Therapy:  Current Nutrition Therapy:   - Oral Diet:  General    Routes of Feeding: Oral  Liquids: No Restrictions  Daily Fluid Restriction: no  Last Modified Barium Swallow with Video (Video Swallowing Test): not done    Treatments at the Time of Hospital Discharge:   Respiratory Treatments: no  Oxygen Therapy:  is not on home oxygen therapy.   Ventilator:    - No ventilator support    Rehab Therapies: Physical Therapy and Occupational Therapy  Weight Bearing Status/Restrictions: No weight bearing restirctions  Other Medical Equipment (for information only, NOT a DME order):  {EQUIPMENT:[id number]}  Other Treatments: ***    Patient's personal belongings (please select all that are sent with patient):  cell phone, pictures, clothing, stuffed animal    RN SIGNATURE:  Electronically signed by Priya Palacios RN on 4/27/20 at 8:11 PM EDT    CASE MANAGEMENT/SOCIAL WORK SECTION    Inpatient Status Date: ***    Readmission Risk Assessment Score:  Readmission Risk              Risk of Unplanned Readmission:        14           Discharging to Facility/ Agency   · Name:   · Address:  · Phone:  · Fax:    Dialysis Facility (if applicable)   · Name:  · Address:  · Dialysis Schedule:  · Phone:  · Fax:    / signature: {Esignature:[id number]}    PHYSICIAN

## 2020-04-27 NOTE — PLAN OF CARE
Problem: Falls - Risk of:  Goal: Will remain free from falls  Description: Will remain free from falls  Outcome: Ongoing  Note: A: Sound com dome light, fall risk arm band, bed/chair alarm, bed in lowest position, wheels of bed/chair locked, non skid footwear, call light in reach, fall precaution education, intentional rounding. PT/OT eval/treat    Goal: Absence of physical injury  Description: Absence of physical injury  Outcome: Ongoing     Problem: Mental Status - Impaired:  Goal: Mental status will improve  Description: Mental status will improve  4/27/2020 1027 by Jordyn Douglas RN  Outcome: Ongoing  Note: A: Assess orientation and mental status. Report changes to physician. Neuro and Psych consulted. 4/27/2020 0645 by Martinez Santana RN  Outcome: Ongoing     Problem: Restraint Use - Nonviolent/Non-Self-Destructive Behavior:  Goal: Absence of restraint indications  Description: Absence of restraint indications   4/27/2020 1027 by Jordyn Douglas RN  Outcome: Ongoing  Note: A: Assess need for continued use. Assess for restraint related injury. Provide assistance with elimination, nutrition, and ROM. Educate patient/family re: criteria for removal.    4/27/2020 0645 by Martinez Santana RN  Outcome: Ongoing  Goal: Absence of restraint-related injury  Description: Absence of restraint-related injury   Outcome: Ongoing     Problem: Urinary Elimination:  Goal: Signs and symptoms of infection will decrease  Description: Signs and symptoms of infection will decrease  4/27/2020 0645 by Martinez Santana RN  Outcome: Ongoing  Goal: Complications related to the disease process, condition or treatment will be avoided or minimized  Description: Complications related to the disease process, condition or treatment will be avoided or minimized  Outcome: Ongoing  Note: A: Assess need for continued use. Assess and educate for signs/symptoms of infection. Monitor quality and quantity of urine output.  Stat lock in place, drainage bag hanging below level of bladder, routine catheter care.  Urology consulted     Problem: Skin Integrity:  Goal: Will show no infection signs and symptoms  Description: Will show no infection signs and symptoms  4/27/2020 1027 by Stevens Babinski, RN  Outcome: Ongoing  Note: A: Assess scabbed abrasion for signs/symptoms of infection  4/27/2020 0645 by Dyllan Vargas RN  Outcome: Ongoing  Goal: Absence of new skin breakdown  Description: Absence of new skin breakdown  Outcome: Ongoing  Note: A: Low air loss/alternating pressure specialty mattress, assist with turn/repositioning, incontinence care, off loading pressure areas, Nutritional consult, daily weight, I/O, monitor meal intake

## 2020-04-27 NOTE — PLAN OF CARE
Problem: Mental Status - Impaired:  Goal: Mental status will improve  Outcome: Ongoing, remains confused, disoriented, not following commands, reoriented with rounding. Has only had about 45min sleeping intervals, seroquel given x1  Problem: Restraint Use - Nonviolent/Non-Self-Destructive Behavior:  Goal: Absence of restraint indications  Outcome: Ongoing, no injury, repositioned and ROM done per protocol.   Problem: Skin Integrity:  Goal: Will show no infection signs and symptoms  Outcome: Ongoing, coccyx remains intact, laying mostly on back, repositioned but turns self back

## 2020-04-28 ENCOUNTER — HOSPITAL ENCOUNTER (INPATIENT)
Age: 55
LOS: 11 days | Discharge: NURSING, REHAB OR LONG-TERM CARE | DRG: 052 | End: 2020-05-09
Attending: INTERNAL MEDICINE | Admitting: INTERNAL MEDICINE
Payer: COMMERCIAL

## 2020-04-28 ENCOUNTER — APPOINTMENT (OUTPATIENT)
Dept: GENERAL RADIOLOGY | Age: 55
DRG: 052 | End: 2020-04-28
Attending: INTERNAL MEDICINE
Payer: COMMERCIAL

## 2020-04-28 VITALS
OXYGEN SATURATION: 98 % | HEART RATE: 65 BPM | HEIGHT: 72 IN | DIASTOLIC BLOOD PRESSURE: 113 MMHG | RESPIRATION RATE: 20 BRPM | BODY MASS INDEX: 30.28 KG/M2 | TEMPERATURE: 99.1 F | WEIGHT: 223.55 LBS | SYSTOLIC BLOOD PRESSURE: 135 MMHG

## 2020-04-28 PROBLEM — R41.82 AMS (ALTERED MENTAL STATUS): Status: ACTIVE | Noted: 2020-04-28

## 2020-04-28 LAB
ANION GAP SERPL CALCULATED.3IONS-SCNC: 11 MMOL/L (ref 3–16)
BUN BLDV-MCNC: 14 MG/DL (ref 7–20)
CALCIUM SERPL-MCNC: 9.6 MG/DL (ref 8.3–10.6)
CHLORIDE BLD-SCNC: 103 MMOL/L (ref 99–110)
CO2: 26 MMOL/L (ref 21–32)
CREAT SERPL-MCNC: 0.8 MG/DL (ref 0.9–1.3)
GFR AFRICAN AMERICAN: >60
GFR NON-AFRICAN AMERICAN: >60
GLUCOSE BLD-MCNC: 94 MG/DL (ref 70–99)
HCT VFR BLD CALC: 39.7 % (ref 40.5–52.5)
HEMOGLOBIN: 13.8 G/DL (ref 13.5–17.5)
MCH RBC QN AUTO: 31.9 PG (ref 26–34)
MCHC RBC AUTO-ENTMCNC: 34.9 G/DL (ref 31–36)
MCV RBC AUTO: 91.5 FL (ref 80–100)
PDW BLD-RTO: 15.1 % (ref 12.4–15.4)
PLATELET # BLD: 231 K/UL (ref 135–450)
PMV BLD AUTO: 9.4 FL (ref 5–10.5)
POTASSIUM REFLEX MAGNESIUM: 3.6 MMOL/L (ref 3.5–5.1)
RBC # BLD: 4.34 M/UL (ref 4.2–5.9)
SODIUM BLD-SCNC: 140 MMOL/L (ref 136–145)
WBC # BLD: 6.2 K/UL (ref 4–11)

## 2020-04-28 PROCEDURE — 62328 DX LMBR SPI PNXR W/FLUOR/CT: CPT

## 2020-04-28 PROCEDURE — U0003 INFECTIOUS AGENT DETECTION BY NUCLEIC ACID (DNA OR RNA); SEVERE ACUTE RESPIRATORY SYNDROME CORONAVIRUS 2 (SARS-COV-2) (CORONAVIRUS DISEASE [COVID-19]), AMPLIFIED PROBE TECHNIQUE, MAKING USE OF HIGH THROUGHPUT TECHNOLOGIES AS DESCRIBED BY CMS-2020-01-R: HCPCS

## 2020-04-28 PROCEDURE — 2580000003 HC RX 258: Performed by: STUDENT IN AN ORGANIZED HEALTH CARE EDUCATION/TRAINING PROGRAM

## 2020-04-28 PROCEDURE — 80048 BASIC METABOLIC PNL TOTAL CA: CPT

## 2020-04-28 PROCEDURE — 6370000000 HC RX 637 (ALT 250 FOR IP): Performed by: STUDENT IN AN ORGANIZED HEALTH CARE EDUCATION/TRAINING PROGRAM

## 2020-04-28 PROCEDURE — U0002 COVID-19 LAB TEST NON-CDC: HCPCS

## 2020-04-28 PROCEDURE — 2500000003 HC RX 250 WO HCPCS: Performed by: NURSE PRACTITIONER

## 2020-04-28 PROCEDURE — 89050 BODY FLUID CELL COUNT: CPT

## 2020-04-28 PROCEDURE — 00JU3ZZ INSPECTION OF SPINAL CANAL, PERCUTANEOUS APPROACH: ICD-10-PCS | Performed by: RADIOLOGY

## 2020-04-28 PROCEDURE — 95813 EEG EXTND MNTR 61-119 MIN: CPT

## 2020-04-28 PROCEDURE — 6370000000 HC RX 637 (ALT 250 FOR IP): Performed by: INTERNAL MEDICINE

## 2020-04-28 PROCEDURE — 85027 COMPLETE CBC AUTOMATED: CPT

## 2020-04-28 PROCEDURE — 6360000002 HC RX W HCPCS: Performed by: NURSE PRACTITIONER

## 2020-04-28 PROCEDURE — 2580000003 HC RX 258: Performed by: NURSE PRACTITIONER

## 2020-04-28 PROCEDURE — 2060000000 HC ICU INTERMEDIATE R&B

## 2020-04-28 RX ORDER — ASPIRIN 81 MG/1
81 TABLET ORAL DAILY
Status: DISCONTINUED | OUTPATIENT
Start: 2020-04-28 | End: 2020-05-02 | Stop reason: SDUPTHER

## 2020-04-28 RX ORDER — ATORVASTATIN CALCIUM 10 MG/1
10 TABLET, FILM COATED ORAL DAILY
Status: DISCONTINUED | OUTPATIENT
Start: 2020-04-28 | End: 2020-05-01

## 2020-04-28 RX ORDER — LORAZEPAM 2 MG/ML
0.5 INJECTION INTRAMUSCULAR EVERY 8 HOURS SCHEDULED
Status: DISCONTINUED | OUTPATIENT
Start: 2020-04-28 | End: 2020-05-06

## 2020-04-28 RX ORDER — QUETIAPINE FUMARATE 25 MG/1
25 TABLET, FILM COATED ORAL 2 TIMES DAILY PRN
Status: DISCONTINUED | OUTPATIENT
Start: 2020-04-28 | End: 2020-05-01

## 2020-04-28 RX ORDER — FAMOTIDINE 20 MG/1
20 TABLET, FILM COATED ORAL 2 TIMES DAILY
Status: DISCONTINUED | OUTPATIENT
Start: 2020-04-28 | End: 2020-05-01

## 2020-04-28 RX ORDER — SODIUM CHLORIDE 0.9 % (FLUSH) 0.9 %
10 SYRINGE (ML) INJECTION PRN
Status: DISCONTINUED | OUTPATIENT
Start: 2020-04-28 | End: 2020-05-09 | Stop reason: HOSPADM

## 2020-04-28 RX ORDER — SODIUM CHLORIDE 0.9 % (FLUSH) 0.9 %
10 SYRINGE (ML) INJECTION EVERY 12 HOURS SCHEDULED
Status: DISCONTINUED | OUTPATIENT
Start: 2020-04-28 | End: 2020-05-09 | Stop reason: HOSPADM

## 2020-04-28 RX ORDER — ACETAMINOPHEN 325 MG/1
650 TABLET ORAL EVERY 6 HOURS PRN
Status: DISCONTINUED | OUTPATIENT
Start: 2020-04-28 | End: 2020-05-09 | Stop reason: HOSPADM

## 2020-04-28 RX ORDER — TAMSULOSIN HYDROCHLORIDE 0.4 MG/1
0.4 CAPSULE ORAL NIGHTLY
Status: DISCONTINUED | OUTPATIENT
Start: 2020-04-28 | End: 2020-05-09 | Stop reason: HOSPADM

## 2020-04-28 RX ORDER — ONDANSETRON 2 MG/ML
4 INJECTION INTRAMUSCULAR; INTRAVENOUS EVERY 6 HOURS PRN
Status: DISCONTINUED | OUTPATIENT
Start: 2020-04-28 | End: 2020-05-01

## 2020-04-28 RX ORDER — PROMETHAZINE HYDROCHLORIDE 25 MG/1
12.5 TABLET ORAL EVERY 6 HOURS PRN
Status: DISCONTINUED | OUTPATIENT
Start: 2020-04-28 | End: 2020-05-01

## 2020-04-28 RX ORDER — POLYETHYLENE GLYCOL 3350 17 G/17G
17 POWDER, FOR SOLUTION ORAL DAILY PRN
Status: DISCONTINUED | OUTPATIENT
Start: 2020-04-28 | End: 2020-05-01

## 2020-04-28 RX ORDER — DIAZEPAM 2 MG/1
2 TABLET ORAL EVERY 6 HOURS
Status: DISCONTINUED | OUTPATIENT
Start: 2020-04-28 | End: 2020-04-28

## 2020-04-28 RX ORDER — LISINOPRIL 10 MG/1
10 TABLET ORAL DAILY
Status: DISCONTINUED | OUTPATIENT
Start: 2020-04-28 | End: 2020-05-01

## 2020-04-28 RX ORDER — ACETAMINOPHEN 650 MG/1
650 SUPPOSITORY RECTAL EVERY 6 HOURS PRN
Status: DISCONTINUED | OUTPATIENT
Start: 2020-04-28 | End: 2020-05-09 | Stop reason: HOSPADM

## 2020-04-28 RX ADMIN — TAMSULOSIN HYDROCHLORIDE 0.4 MG: 0.4 CAPSULE ORAL at 22:10

## 2020-04-28 RX ADMIN — QUETIAPINE FUMARATE 25 MG: 25 TABLET ORAL at 03:02

## 2020-04-28 RX ADMIN — Medication 10 ML: at 22:12

## 2020-04-28 RX ADMIN — Medication 10 ML: at 09:56

## 2020-04-28 RX ADMIN — LORAZEPAM 0.5 MG: 2 INJECTION INTRAMUSCULAR; INTRAVENOUS at 22:07

## 2020-04-28 RX ADMIN — FAMOTIDINE 20 MG: 20 TABLET ORAL at 22:09

## 2020-04-28 RX ADMIN — LORAZEPAM 0.5 MG: 2 INJECTION INTRAMUSCULAR; INTRAVENOUS at 14:50

## 2020-04-28 RX ADMIN — QUETIAPINE 25 MG: 25 TABLET, FILM COATED ORAL at 17:20

## 2020-04-28 RX ADMIN — THIAMINE HYDROCHLORIDE: 100 INJECTION, SOLUTION INTRAMUSCULAR; INTRAVENOUS at 12:50

## 2020-04-28 ASSESSMENT — PAIN SCALES - GENERAL
PAINLEVEL_OUTOF10: 0

## 2020-04-28 NOTE — PLAN OF CARE
Problem: Falls - Risk of:  Goal: Will remain free from falls  Description: Will remain free from falls  Outcome: Ongoing  Note: Patient currently in bed, low position, alarm armed, avasys monitor in place, call light within reach     Problem: Restraint Use - Nonviolent/Non-Self-Destructive Behavior:  Goal: Absence of restraint indications  Description: Absence of restraint indications  Outcome: Ongoing  Note: Bilat soft wrist restraints remain in place.  Patient continues to pull/attempt to remove lines and medical equipment

## 2020-04-28 NOTE — PROGRESS NOTES
Attempted to contact patient's SO Kit [employer] for update on patient status, complete MRI checklist and to obtain informed consent for LP. VM left.  Will re-attempt

## 2020-04-28 NOTE — CONSULTS
Neurology Consultation Note    Patient: Mary Jo Byrd MRN: [id number]    YOB: 1965  Age: 48 y.o. Sex: male   Unit: [address] [facility] 4 PCU Room/Bed: 7276/3199-74 Location: [address] [place] [address]    Date of Consultation: 4/28/2020  Date of Admission: 4/28/2020  4:45 AM ( LOS: 0 days )  Admitting Physician: Shaq Brewer    Primary Care Physician: Abbey Restrepo MD   Consult Requested By: Bambi Hebert MD     Reason for Consult: \"Transfer from [facility] for [address] and altered mental status\"    ASSESSMENT & RECOMMENDATIONS     Assessment  - Pt not only with encephalopathy but also with some bizarre and belligerent behaviors as well  - Very inconsistent with seizure and certainly not consistent with status epilepticus  - If any seizure activity did occur, wonder if etiology is benzo withdrawal seizures given that he apparently takes multiple benzos (simultaneously or in various combinations) at home  - Pt was transferred for cvEEG, but not sure this is necessary. Additionally, pt is refusing EEG leads being placed  - His bizarre behavior and overall encephalopathy would not be expected from benzo withdrawal and may or may not be somehow contributed to by amphetamine withdrawal; therefore     Recommendations  - Will forego cvEEG for now given that he is awake and responsive enough to be refusing placement of leads  - Can attempt to get routine EEG at some point  - CSF studies given encephalopathy not easily attributable to medication withdrawal nor toxicity  - Repeat brain MRI with contrast  - Stroke w/u as outlined in [facility], CNP note      SUBJECTIVE     Chief Complaint:   Transferred for cvEEG for encephalopathy and behavior change    History of Present Illness:  Yonas Zavala is a 48 y.o. man with PHx sig for HTN; sleep apnea; depression; Bipolar 1. Pt is a COVID r/o. Transferred from [facility] over concerns for possible status epilepticus. Pt had presented there with encephalopathy and multiple falls. Pain    Review of Systems:  No fevers; no fatigue; no general malaise; no visual changes/disturbances; no SOB; no cough; no CP; no palpitations; no abdominal pain; no nausea; no vomiting; no dyspepsia; no diarrhea; no constipation; no urinary complaints; no skin lesions/rashes; no myalgias; no arthralgias; no changes in mood/personality; neuro & others as per HPI    OBJECTIVE     Vitals:  Patient Vitals for the past 36 hrs:   BP Temp Temp src Pulse Resp SpO2 Height Weight   04/28/20 1244 119/82 98.1 °F (36.7 °C) Oral 74 18 99 % -- --   04/28/20 0900 (!) 146/86 -- -- 55 -- -- -- --   04/28/20 0828 (!) 160/97 98 °F (36.7 °C) Oral 68 19 100 % -- --   04/28/20 0524 -- -- -- -- -- -- 6' (1.829 m) --   04/28/20 0455 (!) 155/86 98 °F (36.7 °C) Axillary 75 16 97 % -- 217 lb 6 oz (98.6 kg)       Neurological Exam   Pt is a COVID-19 rule out and in attempts to limit exposure and/or spread of virus as well as to preserve PPE, which is in short supply, examination is deferred at this time. Imaging: All reports below personally reviewed & actual images reviewed where indicated. Pertinent positives & negatives are addressed in Assessment & Plan section of note  MRI BRAIN WO CONTRAST; 04/28/20, 1153  Images unable to review (not available in PACS) --ERIC    1. Evaluation is limited due to extensive patient motion. 2. Punctate acute infarct involving the left parietal lobe. No mass effect or midline shift. 3. Otherwise, no acute intracranial abnormality. 4. Minimal chronic microvascular ischemic changes. Laboratory Review: All results below personally reviewed.  Pertinent positives & negatives are addressed in Assessment & Plan section of note  Recent Results (from the past 36 hour(s))   CBC    Collection Time: 04/27/20  6:48 AM   Result Value Ref Range    WBC 5.7 4.0 - 11.0 K/uL    RBC 4.15 (L) 4.20 - 5.90 M/uL    Hemoglobin 13.1 (L) 13.5 - 17.5 g/dL    Hematocrit 38.0 (L) 40.5 - 52.5 %    MCV 91.6 80.0 - 100.0

## 2020-04-28 NOTE — CONSULTS
Neurology consult Note    Dr. Devorah Neville requesting this consult. CC: Seizures  HPI:     Mr. Jennifer Barbour is a 48year old man with a PMH HTN, sleep apnea, depression, Bipolar 1 who presented 10 days ago to [facility] with multiple falls and altered mental status. His home medication list is a bit unclear but he reportedly is on aderall, klonopin, valium, and xanax. He had an MRI (not available in PACS) that reportedly showed punctate acute infarct in the left parietal lobe. I attempted to contact Suzan Franco, his domestic partner, for a more thorough history but she did not answer the phone.      Past Medical History:   Diagnosis Date    ADHD     Arthritis     Bipolar 1 disorder ([facility] [address]     Depression     Hypertension     Sleep apnea      Past Surgical History:   Procedure Laterality Date    FRACTURE SURGERY Right     FX Tibia / Fibula    LEG SURGERY Right     Remove Plate    SHOULDER ARTHROSCOPY Right 11/8/2019    RIGHT SHOULDER ARTHROSCOPY,  ROTATOR CUFF REPAIR, SUBACROMIC DECOMPRESSION, LABRIAL DEBRIDEMENT performed by Catharine Hammans, MD at [facility]      x 4       CURRENT MEDICATIONS:    Current Facility-Administered Medications:     aspirin EC tablet 81 mg, 81 mg, Oral, Daily, Yuki Dasilva MD    atorvastatin (LIPITOR) tablet 10 mg, 10 mg, Oral, Daily, Yuki Dasilva MD    famotidine (PEPCID) tablet 20 mg, 20 mg, Oral, BID, Yuki Dasilva MD    lisinopril (PRINIVIL;ZESTRIL) tablet 10 mg, 10 mg, Oral, Daily, Yuki Dasilva MD    QUEtiapine (SEROQUEL) tablet 25 mg, 25 mg, Oral, BID PRN, Yuki Dasilva MD    [facility]) capsule 0.4 mg, 0.4 mg, Oral, Nightly, Yuki Dasilva MD    sodium chloride flush 0.9 % injection 10 mL, 10 mL, Intravenous, 2 times per day, Yuki Dasilva MD    sodium chloride flush 0.9 % injection 10 mL, 10 mL, Intravenous, PRN, Yuki Dasilva MD    acetaminophen (TYLENOL) tablet 650 mg, 650 mg, Oral, Q6H PRN **OR** acetaminophen (TYLENOL) suppository 650 mg, 650 mg, Rectal, Q6H PRN, Dudley Alvarez MD    polyethylene glycol [facility]) packet 17 g, 17 g, Oral, Daily PRN, Dudley Alvarez MD    promethazine (PHENERGAN) tablet 12.5 mg, 12.5 mg, Oral, Q6H PRN **OR** ondansetron (ZOFRAN) injection 4 mg, 4 mg, Intravenous, Q6H PRN, Dudley Alvarez MD      ROS:   JULIO CÉSAR    Constitutional  BP (!) 160/97   Pulse 68   Temp 98 °F (36.7 °C) (Oral)   Resp 19   Ht 6' (1.829 m)   Wt 217 lb 6 oz (98.6 kg)   SpO2 100%   BMI 29.48 kg/m²       Agitated. Moving all extremities. Oriented to self. Images:    CTA head and neck, 4/21/2020:  Significant limitation due to motion related artifact, particularly in the head  No focal significant arterial narrowing in the neck  No definitive focal significant arterial narrowing in the head however distal branches in the anterior circulation and posterior circulation are significantly limited due to artifact     MRI wo contrast, 4/21/2020 ([facility]):  1. Evaluation is limited due to extensive patient motion. 2. Punctate acute infarct involving the left parietal lobe. No mass effect or midline shift. 3. Otherwise, no acute intracranial abnormality. 4. Minimal chronic microvascular ischemic changes. Echocardiogram without bubble:   A bubble study was not performed due to sub-optimal imaging and poor patient   compliance. Overall left ventricular function is normal.   Left atrium is of normal size. RV appears to be normal size and function. Assessment: 48year old admitted to [facility] several days ago with falls and altered mental status. He was transferred to [facility] for cvEEG. It is difficult to determine the cause of his encephalopathy at this time. Considering it has been over a week of alteration, I think this warrants a full neurologic workup.         Plan:    Encephalopathy  -MRI brain w/wo contrast  -The patient will need a lumbar puncture (when obtaining, please order CSF glucose, protein, bacterial culture, ACE, viral culture, HSV PCR,

## 2020-04-28 NOTE — PROGRESS NOTES
Progress Note    Admit Date: 4/28/2020  Day: 1  Diet: Diet NPO Effective Now    CC: AMS    Interval history: Transferred from [facility] overnight for cvEEG in setting of AMS from unclear etiology. In an effort to conserve PPE, patient was not directly examined this morning. Medications:     Scheduled Meds:   aspirin  81 mg Oral Daily    atorvastatin  10 mg Oral Daily    famotidine  20 mg Oral BID    lisinopril  10 mg Oral Daily    tamsulosin  0.4 mg Oral Nightly    sodium chloride flush  10 mL Intravenous 2 times per day     Continuous Infusions:  PRN Meds:QUEtiapine, sodium chloride flush, acetaminophen **OR** acetaminophen, polyethylene glycol, promethazine **OR** ondansetron    Objective:   Vitals:   T-max:  Patient Vitals for the past 8 hrs:   BP Temp Temp src Pulse Resp SpO2 Height Weight   04/28/20 0524 -- -- -- -- -- -- 6' (1.829 m) --   04/28/20 0455 (!) 155/86 98 °F (36.7 °C) Axillary 75 16 97 % -- 217 lb 6 oz (98.6 kg)     No intake or output data in the 24 hours ending 04/28/20 0805    Review of Systems    Physical Exam    LABS:    CBC:   Recent Labs     04/27/20  0648 04/28/20  0650   WBC 5.7 6.2   HGB 13.1* 13.8   HCT 38.0* 39.7*    231   MCV 91.6 91.5     Renal:    Recent Labs     04/27/20  0648 04/28/20  0650    140   K 3.8 3.6    103   CO2 20* 26   BUN 12 14   CREATININE 0.7* 0.8*   GLUCOSE 91 94   CALCIUM 8.7 9.6   ANIONGAP 14 11     Hepatic:   Recent Labs     04/27/20  0648   AST 18   ALT 31   BILITOT 0.9   PROT 6.1*   LABALBU 3.7   ALKPHOS 88     Troponin: No results for input(s): TROPONINI in the last 72 hours. BNP: No results for input(s): BNP in the last 72 hours. Lipids: No results for input(s): CHOL, HDL in the last 72 hours. Invalid input(s): LDLCALCU, TRIGLYCERIDE  ABGs:  No results for input(s): PHART, IVS1BCU, PO2ART, WZO8BXX, BEART, THGBART, U2RTRNEE, ATB0QOP in the last 72 hours. INR: No results for input(s): INR in the last 72 hours.   Lactate: No results for input(s): LACTATE in the last 72 hours. Cultures:  -----------------------------------------------------------------  RAD:   No orders to display       Assessment/Plan:     Acute encephalopathy, unclear etiology - From the review of notes medical workup has not revealed a cause for AMS. Positive UDS with amphetamines and benzos however that does not explain the mental status change since his admission to [address] [place] [address] has been over a week. MRI done of brain showed a left parietal lobe infarct which was acute in nature. Neurology followed the patient at [address] [place] [address] and did not think that his mental status changes were related to the parietal lobe infarct. Transferred here for cvEEG  -cvEEG  -Consulted neuro  -Repeat LP  -MRI w/wo con  -scheduled valium for possible xanax withdrawal, will taper  -Psych recommendations were made at [address] [place] [address]. Seroquel if needed for agitation. Avoid benzos. The working diagnosis from them was perhaps delirium however to rule out medical causes first.      Acute left parietal stroke - Unclear if he has memory of receptive deficits. Speech is garbled. He is not alert for a proper exam  -Continue aspirin/statin  -Neuro consult  -Studies as above     HTN  -Resumed lisinopril     Agitation  -PRN seroquel BID PRN.  Was ordered four times PRN but reduced to BID    Code Status: Full code  FEN: NPO  PPX: Lovenox held for LP, SCDs  DISPO: Richi Bower MD, PGY-2  04/28/20  8:05 AM    This patient has been staffed and discussed with Jose Cruz Fernandez MD.

## 2020-04-29 ENCOUNTER — ANESTHESIA (OUTPATIENT)
Dept: INTERVENTIONAL RADIOLOGY/VASCULAR | Age: 55
DRG: 052 | End: 2020-04-29
Payer: COMMERCIAL

## 2020-04-29 ENCOUNTER — APPOINTMENT (OUTPATIENT)
Dept: GENERAL RADIOLOGY | Age: 55
DRG: 052 | End: 2020-04-29
Attending: INTERNAL MEDICINE
Payer: COMMERCIAL

## 2020-04-29 ENCOUNTER — ANESTHESIA EVENT (OUTPATIENT)
Dept: INTERVENTIONAL RADIOLOGY/VASCULAR | Age: 55
DRG: 052 | End: 2020-04-29
Payer: COMMERCIAL

## 2020-04-29 ENCOUNTER — APPOINTMENT (OUTPATIENT)
Dept: MRI IMAGING | Age: 55
DRG: 052 | End: 2020-04-29
Attending: INTERNAL MEDICINE
Payer: COMMERCIAL

## 2020-04-29 ENCOUNTER — APPOINTMENT (OUTPATIENT)
Dept: INTERVENTIONAL RADIOLOGY/VASCULAR | Age: 55
DRG: 052 | End: 2020-04-29
Attending: INTERNAL MEDICINE
Payer: COMMERCIAL

## 2020-04-29 VITALS
TEMPERATURE: 101.7 F | RESPIRATION RATE: 6 BRPM | SYSTOLIC BLOOD PRESSURE: 104 MMHG | OXYGEN SATURATION: 100 % | DIASTOLIC BLOOD PRESSURE: 55 MMHG

## 2020-04-29 LAB
ALBUMIN SERPL-MCNC: 4.2 G/DL (ref 3.4–5)
ANION GAP SERPL CALCULATED.3IONS-SCNC: 14 MMOL/L (ref 3–16)
ANION GAP SERPL CALCULATED.3IONS-SCNC: 18 MMOL/L (ref 3–16)
APPEARANCE CSF: ABNORMAL
BASOPHILS ABSOLUTE: 0 K/UL (ref 0–0.2)
BASOPHILS RELATIVE PERCENT: 0.4 %
BILIRUBIN URINE: NEGATIVE
BLOOD, URINE: NEGATIVE
BUN BLDV-MCNC: 17 MG/DL (ref 7–20)
BUN BLDV-MCNC: 19 MG/DL (ref 7–20)
CALCIUM SERPL-MCNC: 9.5 MG/DL (ref 8.3–10.6)
CALCIUM SERPL-MCNC: 9.7 MG/DL (ref 8.3–10.6)
CHLORIDE BLD-SCNC: 100 MMOL/L (ref 99–110)
CHLORIDE BLD-SCNC: 97 MMOL/L (ref 99–110)
CLARITY: CLEAR
CLOT EVALUATION CSF: ABNORMAL
CO2: 21 MMOL/L (ref 21–32)
CO2: 25 MMOL/L (ref 21–32)
COLOR CSF: ABNORMAL
COLOR: YELLOW
CREAT SERPL-MCNC: 1.1 MG/DL (ref 0.9–1.3)
CREAT SERPL-MCNC: 1.2 MG/DL (ref 0.9–1.3)
EOSINOPHILS ABSOLUTE: 0 K/UL (ref 0–0.6)
EOSINOPHILS RELATIVE PERCENT: 0 %
GFR AFRICAN AMERICAN: >60
GFR AFRICAN AMERICAN: >60
GFR NON-AFRICAN AMERICAN: >60
GFR NON-AFRICAN AMERICAN: >60
GLUCOSE BLD-MCNC: 119 MG/DL (ref 70–99)
GLUCOSE BLD-MCNC: 127 MG/DL (ref 70–99)
GLUCOSE URINE: NEGATIVE MG/DL
GLUCOSE, CSF: 64 MG/DL (ref 40–80)
HCT VFR BLD CALC: 41.5 % (ref 40.5–52.5)
HCT VFR BLD CALC: 43.9 % (ref 40.5–52.5)
HEMOGLOBIN: 14.5 G/DL (ref 13.5–17.5)
HEMOGLOBIN: 14.9 G/DL (ref 13.5–17.5)
KETONES, URINE: NEGATIVE MG/DL
LACTIC ACID: 1.9 MMOL/L (ref 0.4–2)
LEUKOCYTE ESTERASE, URINE: NEGATIVE
LYMPHOCYTES ABSOLUTE: 0.4 K/UL (ref 1–5.1)
LYMPHOCYTES RELATIVE PERCENT: 5 %
MAGNESIUM: 2.2 MG/DL (ref 1.8–2.4)
MCH RBC QN AUTO: 31.4 PG (ref 26–34)
MCH RBC QN AUTO: 32.1 PG (ref 26–34)
MCHC RBC AUTO-ENTMCNC: 33.9 G/DL (ref 31–36)
MCHC RBC AUTO-ENTMCNC: 35 G/DL (ref 31–36)
MCV RBC AUTO: 91.9 FL (ref 80–100)
MCV RBC AUTO: 92.7 FL (ref 80–100)
MENINGITIS ENCEPHALITIS PANEL: NORMAL
MICROSCOPIC EXAMINATION: NORMAL
MONOCYTES ABSOLUTE: 0.4 K/UL (ref 0–1.3)
MONOCYTES RELATIVE PERCENT: 5.9 %
NEUTROPHILS ABSOLUTE: 6.2 K/UL (ref 1.7–7.7)
NEUTROPHILS RELATIVE PERCENT: 88.7 %
NITRITE, URINE: NEGATIVE
NO DIFFERENTIAL CSF: ABNORMAL
PDW BLD-RTO: 15 % (ref 12.4–15.4)
PDW BLD-RTO: 15.2 % (ref 12.4–15.4)
PH UA: 6 (ref 5–8)
PHOSPHORUS: 3.6 MG/DL (ref 2.5–4.9)
PLATELET # BLD: 204 K/UL (ref 135–450)
PLATELET # BLD: 217 K/UL (ref 135–450)
PMV BLD AUTO: 9.6 FL (ref 5–10.5)
PMV BLD AUTO: 9.6 FL (ref 5–10.5)
POTASSIUM REFLEX MAGNESIUM: 4 MMOL/L (ref 3.5–5.1)
POTASSIUM SERPL-SCNC: 4 MMOL/L (ref 3.5–5.1)
PROTEIN CSF: 72 MG/DL (ref 15–45)
PROTEIN UA: NEGATIVE MG/DL
RBC # BLD: 4.52 M/UL (ref 4.2–5.9)
RBC # BLD: 4.74 M/UL (ref 4.2–5.9)
RBC CSF: ABNORMAL /CUMM
REASON FOR REJECTION: NORMAL
REJECTED TEST: NORMAL
REPORT: NORMAL
SARS-COV-2, PCR: NOT DETECTED
SODIUM BLD-SCNC: 136 MMOL/L (ref 136–145)
SODIUM BLD-SCNC: 139 MMOL/L (ref 136–145)
SPECIFIC GRAVITY UA: 1.02 (ref 1–1.03)
TUBE NUMBER CSF: ABNORMAL
URINE REFLEX TO CULTURE: NORMAL
URINE TYPE: NORMAL
UROBILINOGEN, URINE: 1 E.U./DL
WBC # BLD: 6.8 K/UL (ref 4–11)
WBC # BLD: 7 K/UL (ref 4–11)
WBC CSF: 2 /CUMM (ref 0–5)

## 2020-04-29 PROCEDURE — 3700000000 HC ANESTHESIA ATTENDED CARE

## 2020-04-29 PROCEDURE — 81003 URINALYSIS AUTO W/O SCOPE: CPT

## 2020-04-29 PROCEDURE — 2580000003 HC RX 258: Performed by: STUDENT IN AN ORGANIZED HEALTH CARE EDUCATION/TRAINING PROGRAM

## 2020-04-29 PROCEDURE — 85027 COMPLETE CBC AUTOMATED: CPT

## 2020-04-29 PROCEDURE — 85025 COMPLETE CBC W/AUTO DIFF WBC: CPT

## 2020-04-29 PROCEDURE — 88112 CYTOPATH CELL ENHANCE TECH: CPT

## 2020-04-29 PROCEDURE — 87070 CULTURE OTHR SPECIMN AEROBIC: CPT

## 2020-04-29 PROCEDURE — 7100000000 HC PACU RECOVERY - FIRST 15 MIN

## 2020-04-29 PROCEDURE — 87483 CNS DNA AMP PROBE TYPE 12-25: CPT

## 2020-04-29 PROCEDURE — B01B1ZZ FLUOROSCOPY OF SPINAL CORD USING LOW OSMOLAR CONTRAST: ICD-10-PCS | Performed by: RADIOLOGY

## 2020-04-29 PROCEDURE — 2500000003 HC RX 250 WO HCPCS: Performed by: NURSE PRACTITIONER

## 2020-04-29 PROCEDURE — 6360000004 HC RX CONTRAST MEDICATION: Performed by: PSYCHIATRY & NEUROLOGY

## 2020-04-29 PROCEDURE — 6360000002 HC RX W HCPCS

## 2020-04-29 PROCEDURE — 86341 ISLET CELL ANTIBODY: CPT

## 2020-04-29 PROCEDURE — 2500000003 HC RX 250 WO HCPCS: Performed by: NURSE ANESTHETIST, CERTIFIED REGISTERED

## 2020-04-29 PROCEDURE — 6370000000 HC RX 637 (ALT 250 FOR IP): Performed by: STUDENT IN AN ORGANIZED HEALTH CARE EDUCATION/TRAINING PROGRAM

## 2020-04-29 PROCEDURE — 009U3ZX DRAINAGE OF SPINAL CANAL, PERCUTANEOUS APPROACH, DIAGNOSTIC: ICD-10-PCS | Performed by: RADIOLOGY

## 2020-04-29 PROCEDURE — 3700000001 HC ADD 15 MINUTES (ANESTHESIA)

## 2020-04-29 PROCEDURE — 83735 ASSAY OF MAGNESIUM: CPT

## 2020-04-29 PROCEDURE — 71045 X-RAY EXAM CHEST 1 VIEW: CPT

## 2020-04-29 PROCEDURE — C8923 2D TTE W OR W/O FOL W/CON,CO: HCPCS

## 2020-04-29 PROCEDURE — 70553 MRI BRAIN STEM W/O & W/DYE: CPT

## 2020-04-29 PROCEDURE — 2060000000 HC ICU INTERMEDIATE R&B

## 2020-04-29 PROCEDURE — 83605 ASSAY OF LACTIC ACID: CPT

## 2020-04-29 PROCEDURE — 86255 FLUORESCENT ANTIBODY SCREEN: CPT

## 2020-04-29 PROCEDURE — 87040 BLOOD CULTURE FOR BACTERIA: CPT

## 2020-04-29 PROCEDURE — 87205 SMEAR GRAM STAIN: CPT

## 2020-04-29 PROCEDURE — 84157 ASSAY OF PROTEIN OTHER: CPT

## 2020-04-29 PROCEDURE — 7100000001 HC PACU RECOVERY - ADDTL 15 MIN

## 2020-04-29 PROCEDURE — 62328 DX LMBR SPI PNXR W/FLUOR/CT: CPT | Performed by: RADIOLOGY

## 2020-04-29 PROCEDURE — 6360000002 HC RX W HCPCS: Performed by: NURSE PRACTITIONER

## 2020-04-29 PROCEDURE — 2580000003 HC RX 258: Performed by: NURSE PRACTITIONER

## 2020-04-29 PROCEDURE — 95813 EEG EXTND MNTR 61-119 MIN: CPT

## 2020-04-29 PROCEDURE — 6360000002 HC RX W HCPCS: Performed by: NURSE ANESTHETIST, CERTIFIED REGISTERED

## 2020-04-29 PROCEDURE — 82945 GLUCOSE OTHER FLUID: CPT

## 2020-04-29 PROCEDURE — 36415 COLL VENOUS BLD VENIPUNCTURE: CPT

## 2020-04-29 PROCEDURE — 2500000003 HC RX 250 WO HCPCS

## 2020-04-29 PROCEDURE — 2580000003 HC RX 258: Performed by: NURSE ANESTHETIST, CERTIFIED REGISTERED

## 2020-04-29 PROCEDURE — 80069 RENAL FUNCTION PANEL: CPT

## 2020-04-29 RX ORDER — OXYCODONE HYDROCHLORIDE 5 MG/1
10 TABLET ORAL PRN
Status: ACTIVE | OUTPATIENT
Start: 2020-04-29 | End: 2020-04-29

## 2020-04-29 RX ORDER — LABETALOL 20 MG/4 ML (5 MG/ML) INTRAVENOUS SYRINGE
5 EVERY 10 MIN PRN
Status: DISCONTINUED | OUTPATIENT
Start: 2020-04-29 | End: 2020-05-07 | Stop reason: HOSPADM

## 2020-04-29 RX ORDER — MORPHINE SULFATE 4 MG/ML
1 INJECTION, SOLUTION INTRAMUSCULAR; INTRAVENOUS EVERY 5 MIN PRN
Status: DISCONTINUED | OUTPATIENT
Start: 2020-04-29 | End: 2020-05-07 | Stop reason: HOSPADM

## 2020-04-29 RX ORDER — MEPERIDINE HYDROCHLORIDE 25 MG/ML
12.5 INJECTION INTRAMUSCULAR; INTRAVENOUS; SUBCUTANEOUS EVERY 5 MIN PRN
Status: DISCONTINUED | OUTPATIENT
Start: 2020-04-29 | End: 2020-05-07 | Stop reason: HOSPADM

## 2020-04-29 RX ORDER — SODIUM CHLORIDE 9 MG/ML
INJECTION, SOLUTION INTRAVENOUS CONTINUOUS PRN
Status: DISCONTINUED | OUTPATIENT
Start: 2020-04-29 | End: 2020-04-29 | Stop reason: SDUPTHER

## 2020-04-29 RX ORDER — HYDRALAZINE HYDROCHLORIDE 20 MG/ML
5 INJECTION INTRAMUSCULAR; INTRAVENOUS EVERY 10 MIN PRN
Status: DISCONTINUED | OUTPATIENT
Start: 2020-04-29 | End: 2020-05-07 | Stop reason: HOSPADM

## 2020-04-29 RX ORDER — 0.9 % SODIUM CHLORIDE 0.9 %
500 INTRAVENOUS SOLUTION INTRAVENOUS ONCE
Status: COMPLETED | OUTPATIENT
Start: 2020-04-29 | End: 2020-04-30

## 2020-04-29 RX ORDER — PROMETHAZINE HYDROCHLORIDE 25 MG/ML
6.25 INJECTION, SOLUTION INTRAMUSCULAR; INTRAVENOUS
Status: ACTIVE | OUTPATIENT
Start: 2020-04-29 | End: 2020-04-29

## 2020-04-29 RX ORDER — DIPHENHYDRAMINE HYDROCHLORIDE 50 MG/ML
12.5 INJECTION INTRAMUSCULAR; INTRAVENOUS
Status: ACTIVE | OUTPATIENT
Start: 2020-04-29 | End: 2020-04-29

## 2020-04-29 RX ORDER — SUCCINYLCHOLINE CHLORIDE 20 MG/ML
INJECTION INTRAMUSCULAR; INTRAVENOUS PRN
Status: DISCONTINUED | OUTPATIENT
Start: 2020-04-29 | End: 2020-04-29 | Stop reason: SDUPTHER

## 2020-04-29 RX ORDER — LIDOCAINE HYDROCHLORIDE 20 MG/ML
INJECTION, SOLUTION INFILTRATION; PERINEURAL PRN
Status: DISCONTINUED | OUTPATIENT
Start: 2020-04-29 | End: 2020-04-29 | Stop reason: SDUPTHER

## 2020-04-29 RX ORDER — PROPOFOL 10 MG/ML
INJECTION, EMULSION INTRAVENOUS PRN
Status: DISCONTINUED | OUTPATIENT
Start: 2020-04-29 | End: 2020-04-29 | Stop reason: SDUPTHER

## 2020-04-29 RX ORDER — OXYCODONE HYDROCHLORIDE 5 MG/1
5 TABLET ORAL PRN
Status: ACTIVE | OUTPATIENT
Start: 2020-04-29 | End: 2020-04-29

## 2020-04-29 RX ADMIN — PROPOFOL 200 MG: 10 INJECTION, EMULSION INTRAVENOUS at 14:13

## 2020-04-29 RX ADMIN — THIAMINE HYDROCHLORIDE: 100 INJECTION, SOLUTION INTRAMUSCULAR; INTRAVENOUS at 08:34

## 2020-04-29 RX ADMIN — Medication 10 ML: at 08:28

## 2020-04-29 RX ADMIN — SUCCINYLCHOLINE CHLORIDE 40 MG: 20 INJECTION, SOLUTION INTRAMUSCULAR; INTRAVENOUS; PARENTERAL at 14:26

## 2020-04-29 RX ADMIN — LORAZEPAM 0.5 MG: 2 INJECTION INTRAMUSCULAR; INTRAVENOUS at 06:30

## 2020-04-29 RX ADMIN — SUCCINYLCHOLINE CHLORIDE 160 MG: 20 INJECTION, SOLUTION INTRAMUSCULAR; INTRAVENOUS; PARENTERAL at 14:13

## 2020-04-29 RX ADMIN — IOPAMIDOL 20 ML: 755 INJECTION, SOLUTION INTRAVENOUS at 09:37

## 2020-04-29 RX ADMIN — PROPOFOL 200 MG: 10 INJECTION, EMULSION INTRAVENOUS at 14:26

## 2020-04-29 RX ADMIN — SODIUM CHLORIDE: 9 INJECTION, SOLUTION INTRAVENOUS at 14:05

## 2020-04-29 RX ADMIN — LIDOCAINE HYDROCHLORIDE 100 MG: 20 INJECTION, SOLUTION INFILTRATION; PERINEURAL at 14:13

## 2020-04-29 RX ADMIN — ACETAMINOPHEN 650 MG: 650 SUPPOSITORY RECTAL at 18:15

## 2020-04-29 ASSESSMENT — PULMONARY FUNCTION TESTS
PIF_VALUE: 13
PIF_VALUE: 16
PIF_VALUE: 5
PIF_VALUE: 0
PIF_VALUE: 5
PIF_VALUE: 2
PIF_VALUE: 22
PIF_VALUE: 0
PIF_VALUE: 13
PIF_VALUE: 1
PIF_VALUE: 4
PIF_VALUE: 0
PIF_VALUE: 0
PIF_VALUE: 5
PIF_VALUE: 2
PIF_VALUE: 0
PIF_VALUE: 0
PIF_VALUE: 4
PIF_VALUE: 7
PIF_VALUE: 0
PIF_VALUE: 2
PIF_VALUE: 3
PIF_VALUE: 20
PIF_VALUE: 21
PIF_VALUE: 2
PIF_VALUE: 0
PIF_VALUE: 23
PIF_VALUE: 0
PIF_VALUE: 5
PIF_VALUE: 3
PIF_VALUE: 21
PIF_VALUE: 0
PIF_VALUE: 5
PIF_VALUE: 19
PIF_VALUE: 21
PIF_VALUE: 1
PIF_VALUE: 1
PIF_VALUE: 2
PIF_VALUE: 0
PIF_VALUE: 4
PIF_VALUE: 2

## 2020-04-29 ASSESSMENT — PAIN SCALES - GENERAL
PAINLEVEL_OUTOF10: 0
PAINLEVEL_OUTOF10: 0

## 2020-04-29 ASSESSMENT — PAIN SCALES - PAIN ASSESSMENT IN ADVANCED DEMENTIA (PAINAD)
NEGVOCALIZATION: 0
BREATHING: 0
BODYLANGUAGE: 0
TOTALSCORE: 0
CONSOLABILITY: 0
FACIALEXPRESSION: 0
FACIALEXPRESSION: 0
TOTALSCORE: 0
BREATHING: 0
BODYLANGUAGE: 0
CONSOLABILITY: 0
NEGVOCALIZATION: 0

## 2020-04-29 NOTE — PROGRESS NOTES
Spoke with [address] [place] [address].  Updated on patient condition, lab results and treatment plan

## 2020-04-29 NOTE — PLAN OF CARE
Problem: Verbal Communication - Impaired:  Goal: Functional communication will improve  Description: Functional communication will improve  Outcome: Ongoing  Note: Pt able to occasionally answer yes/no to questions but predominantly responds with incomprehensible speech/inappropriate words. Pt unable to follow commands. Will continue to monitor. Problem: Respiratory:  Goal: Ability to maintain adequate ventilation will improve  Description: Ability to maintain adequate ventilation will improve  Outcome: Ongoing  Note: Pt has maintained SpO2 > 92% on room air with respiratory rate 18-20 breaths/min with no complaints of shortness of breath/dyspnea. Will continue to monitor.

## 2020-04-29 NOTE — ANESTHESIA POSTPROCEDURE EVALUATION
Department of Anesthesiology  Postprocedure Note    Patient: Carrillo Murry  MRN: [id number]  YOB: 1965  Date of evaluation: 4/29/2020  Time:  3:42 PM     Procedure Summary     Date:  04/29/20 Room / Location:  [facility] Special Procedures    Anesthesia Start:  1147 Anesthesia Stop:  3844    Procedure:  IR LUMBAR PUNCTURE FOR DIAGNOSIS Diagnosis:  (AMS- multiple attempts being unsuccessful)    Scheduled Providers:   Responsible Provider:      Anesthesia Type:  general ASA Status:  3          Anesthesia Type: No value filed. Mckenzie Phase I: Mckenzie Score: 7    Mckenzie Phase II:      Last vitals: Reviewed and per EMR flowsheets.        Anesthesia Post Evaluation    Patient location during evaluation: PACU  Patient participation: complete - patient participated  Level of consciousness: awake and alert  Pain score: 0  Airway patency: patent  Nausea & Vomiting: no nausea and no vomiting  Complications: no  Cardiovascular status: hemodynamically stable  Respiratory status: acceptable  Hydration status: euvolemic

## 2020-04-29 NOTE — PROGRESS NOTES
CONTINUOUS EEG    Name:  Lynda Aponte  Medical Record Number:  [id number]  Age: 48 y.o. Gender: male  : 1965  Today's Date:  2020  Room:  [facility]54455-01  Vital Signs   /72   Pulse 95   Temp 99.9 °F (37.7 °C) (Temporal)   Resp 20   Ht 6' (1.829 m)   Wt 217 lb 6 oz (98.6 kg)   SpO2 99%   BMI 29.48 kg/m²       Patient currently on continuous EEG monitoring. All EEG leads are currently in place with no current issues. Verified [employer] connection via team viewer. Checked in with patient RN for current plan of care. Comments:Pt reconnected after MRI and LP. Alana Ca at [facility] contacted [phone] with new password. Continue monitoring. All leads within 10K limit.     Electronically signed by Maury Landrum on 2020 at 4:42 PM

## 2020-04-29 NOTE — PROGRESS NOTES
Progress Note    Admit Date: 4/28/2020  Day: 2  Diet: Diet NPO Effective Now    CC: AMS    Interval history: Pt received seroquel yesterday evening followed by an attempt at a second LP by IR which was unsuccessful due to pt's AMS and constant movement with recommendation to attempt under anesthesia if required. He was placed on cvEEG. No acute events overnight. COVID-19 resulted negative. Pt seen and examined at bedside this morning. He is lethargic, not verbalizing and only wiggles his left toes and squeezes his hand on command but does not follow other commands. Vitals: Afebrile (Tmax 99.4), BP 120s-150s/70s-90s, HR 100s, SpO2 99% RA  I/Os: in 10 cc, out 900 cc, net -890 cc since admission  Studies: cvEEG without seizures. MRI brain pending. HPI: 49 yo M with PMH bipolar disorder, depression and ADHD presented to [facility] with AMS and multiple falls. UDS with amphetamines, benzos and marijuana. LP yielded minimal fluid for analysis and MRI showed acute left parietal infarct, however neuro did not think this was the cause of his encephalopathy. He was transferred to [facility] for cvEEG given the concern for status epilepticus.       Medications:     Scheduled Meds:   aspirin  81 mg Oral Daily    atorvastatin  10 mg Oral Daily    famotidine  20 mg Oral BID    lisinopril  10 mg Oral Daily    tamsulosin  0.4 mg Oral Nightly    sodium chloride flush  10 mL Intravenous 2 times per day    folic acid, thiamine, multi-vitamin with vitamin K infusion   Intravenous Daily    LORazepam  0.5 mg Intravenous 3 times per day     Continuous Infusions:  PRN Meds:QUEtiapine, sodium chloride flush, acetaminophen **OR** acetaminophen, polyethylene glycol, promethazine **OR** ondansetron    Objective:   Vitals:   T-max:  Patient Vitals for the past 8 hrs:   BP Temp Temp src Pulse Resp SpO2   04/29/20 0454 (!) 158/96 99.4 °F (37.4 °C) Oral 107 18 99 %   04/28/20 2348 (!) 149/97 98.4 °F (36.9 °C) Oral 101 20 97 % Intake/Output Summary (Last 24 hours) at 4/29/2020 0533  Last data filed at 4/29/2020 0454  Gross per 24 hour   Intake 10 ml   Output 900 ml   Net -890 ml       Review of Systems   Unable to perform ROS: Mental status change     Physical Exam  Vitals signs and nursing note reviewed. Constitutional:       General: He is not in acute distress. Appearance: He is not diaphoretic. Comments: Lethargic   HENT:      Head: Normocephalic and atraumatic. Nose: Nose normal. No rhinorrhea. Eyes:      General: No scleral icterus. Conjunctiva/sclera: Conjunctivae normal.      Pupils: Pupils are equal, round, and reactive to light. Neck:      Musculoskeletal: Normal range of motion and neck supple. Cardiovascular:      Rate and Rhythm: Normal rate and regular rhythm. Heart sounds: No friction rub. No gallop. Pulmonary:      Effort: Pulmonary effort is normal. No respiratory distress. Breath sounds: No stridor. No rhonchi. Chest:      Chest wall: No tenderness. Abdominal:      General: Bowel sounds are normal. There is no distension. Palpations: Abdomen is soft. Tenderness: There is no abdominal tenderness. Musculoskeletal:         General: No tenderness. Right lower leg: No edema. Left lower leg: No edema. Skin:     General: Skin is warm and dry. Coloration: Skin is not jaundiced or pale. Neurological:      Comments: Pt lethargic, follows very minimal commands: squeezes hands bilaterally and wiggles left toes. Does not verbalize at this time.    Psychiatric:      Comments: Unable to assess     LABS:    CBC:   Recent Labs     04/27/20  0648 04/28/20  0650   WBC 5.7 6.2   HGB 13.1* 13.8   HCT 38.0* 39.7*    231   MCV 91.6 91.5     Renal:    Recent Labs     04/27/20  0648 04/28/20  0650    140   K 3.8 3.6    103   CO2 20* 26   BUN 12 14   CREATININE 0.7* 0.8*   GLUCOSE 91 94   CALCIUM 8.7 9.6   ANIONGAP 14 11     Hepatic:   Recent Labs 04/27/20  0648   AST 18   ALT 31   BILITOT 0.9   PROT 6.1*   LABALBU 3.7   ALKPHOS 88     Troponin: No results for input(s): TROPONINI in the last 72 hours. BNP: No results for input(s): BNP in the last 72 hours. Lipids: No results for input(s): CHOL, HDL in the last 72 hours. Invalid input(s): LDLCALCU, TRIGLYCERIDE  ABGs:  No results for input(s): PHART, NRQ4UEA, PO2ART, RLL0YCP, BEART, THGBART, J4URVCCE, PJE3AOC in the last 72 hours. INR: No results for input(s): INR in the last 72 hours. Lactate: No results for input(s): LACTATE in the last 72 hours. Cultures:  -----------------------------------------------------------------  RAD:   FL LUMBAR PUNCTURE DIAG   Final Result   Impression: Extensive fluoroscopically guided lumbar puncture was unsuccessful secondary to patient condition, altered mental status and constant movement. The patient was aborted due to safety concerns. The procedure can be attempted under anesthesia if    clinically appropriate. MRI BRAIN W WO CONTRAST    (Results Pending)   IR LUMBAR PUNCTURE FOR DIAGNOSIS    (Results Pending)       Assessment/Plan:     Acute encephalopathy, unclear etiology From the review of notes, medical workup has not revealed a cause for AMS. Positive UDS with amphetamines and benzos however that does not explain the mental status change since his admission to [address] [place] [address] has been over a week. MRI done of brain showed a left parietal lobe infarct which was acute in nature. Neurology followed the patient at [address] [place] [address] and did not think that his mental status changes were related to the parietal lobe infarct. Transferred to [facility] for cvEEG.  -cvEEG  -Neurology following  -Repeat LP  -MRI w/wo con  -scheduled ativan 0.5 mg q8h for possible xanax withdrawal, will taper  -Psych recommendations at [facility]: Seroquel if needed for agitation. Avoid benzos.  The working diagnosis from them was perhaps delirium however to rule out medical causes first.      Acute left parietal stroke - Unclear if he has memory of receptive deficits. Speech is garbled. He is not alert for a proper exam. HbA1c 5.3, LDL 94, .  -Continue aspirin 81mg, atorvastatin 80mg  -SCDs for DVT prophylaxis  -Neuro following  -q4h neuro checks  -Studies as above  -PT/OT/SLP     HTN  -Resumed lisinopril 10 mg daily     Agitation  -PRN seroquel 25 mg BID.  Was ordered four times PRN but reduced to BID    Dispo  - CW following: home with home care vs SNF  -COVID-19 status: negative    Code Status: Full code  FEN: NPO  PPX: Lovenox held for LP, SCDs  DISPO: [facility]    Rabia Pittman MD, PGY-1  04/29/20  5:33 AM    This patient has been staffed and discussed with Jam Spring MD.

## 2020-04-29 NOTE — DISCHARGE SUMMARY
Hospitalist Discharge Summary   4/29/2020 5:20 PM  Subjective:   Admit Date: 4/20/2020  PCP: Ghazala Hough MD  Interval History: pt is being transferred to [facility] for eeg  Monitoring  Pt is still confused  Admitted after a fall  Diagnosed with cva  Rest of the events as in progress notes and chart  Denies any other complaints  Chart reviewed. Diet: No diet orders on file  Medications:   Scheduled Meds:  Continuous Infusions:  CBC:   Recent Labs     04/27/20  0648 04/28/20  0650 04/29/20  0523   WBC 5.7 6.2 6.8   HGB 13.1* 13.8 14.5    231 217     BMP:    Recent Labs     04/27/20  0648 04/28/20  0650 04/29/20  0523    140 139   K 3.8 3.6 4.0    103 100   CO2 20* 26 25   BUN 12 14 17   CREATININE 0.7* 0.8* 1.1   GLUCOSE 91 94 119*     Hepatic:   Recent Labs     04/27/20  0648   AST 18   ALT 31   BILITOT 0.9   ALKPHOS 88     Troponin: No results for input(s): TROPONINI in the last 72 hours. BNP: No results for input(s): BNP in the last 72 hours. Lipids: No results for input(s): CHOL, HDL in the last 72 hours. Invalid input(s): LDLCALCU  INR: No results for input(s): INR in the last 72 hours. Orders Placed This Encounter   Procedures    Clostridium difficile toxin/antigen     Standing Status:   Standing     Number of Occurrences:   1    CSF culture     Standing Status:   Standing     Number of Occurrences:   1    CT HEAD WO CONTRAST     Standing Status:   Standing     Number of Occurrences:   1     Order Specific Question:   Reason for exam:     Answer:   ams     Order Specific Question:   Has a \"code stroke\" or \"stroke alert\" been called?      Answer:   No    CT CERVICAL SPINE WO CONTRAST     Standing Status:   Standing     Number of Occurrences:   1     Order Specific Question:   Reason for exam:     Answer:   ams    XR KNEE RIGHT (1-2 VIEWS)     Standing Status:   Standing     Number of Occurrences:   1    MRI LUMBAR SPINE WO CONTRAST     Standing Status:   Standing     Number chloride flush 0.9 % injection 10 mL  10 mL Intravenous PRN Shirley Gonzales MD        acetaminophen (TYLENOL) tablet 650 mg  650 mg Oral Q6H PRN Shirley Gonzales MD        Or    acetaminophen (TYLENOL) suppository 650 mg  650 mg Rectal Q6H PRN Shirley Gonzales MD        polyethylene glycol (GLYCOLAX) packet 17 g  17 g Oral Daily PRN Shirley Gonzales MD        promethazine (PHENERGAN) tablet 12.5 mg  12.5 mg Oral Q6H PRN Shirley Gonzales MD        Or    ondansetron [facility]) injection 4 mg  4 mg Intravenous Q6H PRN Shirley Gonzales MD        sodium chloride 0.9 % 50 mL with folic acid 1 mg, adult multi-vitamin with vitamin k 10 mL, thiamine 100 mg   Intravenous Daily ALIE Boles CNP   Stopped at 04/29/20 1140    LORazepam (ATIVAN) injection 0.5 mg  0.5 mg Intravenous 3 times per day ALIE Boles CNP   0.5 mg at 04/29/20 0630       Orders Placed This Encounter   Medications    DISCONTD: metoprolol succinate (TOPROL XL) extended release tablet 50 mg    DISCONTD: sodium chloride flush 0.9 % injection 10 mL    DISCONTD: sodium chloride flush 0.9 % injection 10 mL    DISCONTD: acetaminophen (TYLENOL) tablet 650 mg    DISCONTD: acetaminophen (TYLENOL) suppository 650 mg    DISCONTD: polyethylene glycol (GLYCOLAX) packet 17 g    DISCONTD: promethazine (PHENERGAN) tablet 12.5 mg    DISCONTD: ondansetron (ZOFRAN) injection 4 mg    DISCONTD: enoxaparin (LOVENOX) injection 40 mg    DISCONTD: 0.9 % sodium chloride infusion    DISCONTD: aspirin EC tablet 81 mg    DISCONTD: famotidine (PEPCID) tablet 20 mg    DISCONTD: acetaminophen (TYLENOL) tablet 500 mg    DISCONTD: famotidine (PEPCID) tablet 20 mg    DISCONTD: lisinopril (PRINIVIL;ZESTRIL) tablet 20 mg    DISCONTD: QUEtiapine (SEROQUEL) tablet 25 mg    iopamidol (ISOVUE-370) 76 % injection 75 mL    DISCONTD: perflutren lipid microspheres (DEFINITY) injection 1.65 mg    DISCONTD: tamsulosin (FLOMAX) capsule 0.4 mg    DISCONTD: LORazepam

## 2020-04-29 NOTE — ANESTHESIA PRE PROCEDURE
Department of Anesthesiology  Preprocedure Note       Name:  Mk Allen   Age:  48 y.o.  :  1965                                          MRN:  [id number]         Date:  2020      Surgeon: * No surgeons listed *    Procedure: IR LUMBAR PUNCTURE FOR DIAGNOSIS    Medications prior to admission:   Prior to Admission medications    Medication Sig Start Date End Date Taking?  Authorizing Provider   famotidine (PEPCID) 20 MG tablet Take 1 tablet by mouth 2 times daily 20   Germán Fritz MD   tamsulosin (FLOMAX) 0.4 MG capsule Take 1 capsule by mouth nightly 20   Germán Fritz MD   polyethylene glycol (GLYCOLAX) packet Take 17 g by mouth daily as needed for Constipation 20  Germán Fritz MD   QUEtiapine (SEROQUEL) 25 MG tablet Take 1 tablet by mouth 4 times daily as needed for Agitation 20   Germán Fritz MD   lisinopril (PRINIVIL;ZESTRIL) 10 MG tablet Take 1 tablet by mouth daily 20   Germán Fritz MD   ondansetron (ZOFRAN) 4 MG/2ML injection Infuse 2 mLs intravenously every 6 hours as needed for Nausea or Vomiting 20   Germán Fritz MD   enoxaparin (LOVENOX) 40 MG/0.4ML injection Inject 0.4 mLs into the skin daily 20   Germán Fritz MD   aspirin 81 MG EC tablet Take 1 tablet by mouth daily 20   Germán Fritz MD   atorvastatin (LIPITOR) 10 MG tablet Take 1 tablet by mouth daily 20   Germán Fritz MD   ibuprofen (IBU) 600 MG tablet Take 1 tablet by mouth every 6 hours as needed for Pain 19   Sandy Mckeon MD       Current medications:    Current Facility-Administered Medications   Medication Dose Route Frequency Provider Last Rate Last Dose    perflutren lipid microspheres (DEFINITY) injection 1.65 mg  1.5 mL Intravenous ONCE PRN Andrew Norris MD        HYDROmorphone (DILAUDID) injection 0.25 mg  0.25 mg Intravenous Q5 Min PRN Ousmane Dawkins MD        HYDROmorphone (DILAUDID) injection 0.5 mg  0.5 04/29/20 0800 04/29/20 1137   BP: (!) 149/97 (!) 158/96 137/89 134/86   Pulse: 101 107 103 105   Resp: 20 18 19 19   Temp: 98.4 °F (36.9 °C) 99.4 °F (37.4 °C) 99.2 °F (37.3 °C)    TempSrc: Oral Oral Axillary    SpO2: 97% 99% 97% 94%   Weight:       Height:                                                  BP Readings from Last 3 Encounters:   04/29/20 134/86   04/28/20 (!) 135/113   04/24/20 (!) 91/42       NPO Status:                                                                                 BMI:   Wt Readings from Last 3 Encounters:   04/28/20 217 lb 6 oz (98.6 kg)   04/26/20 223 lb 8.7 oz (101.4 kg)   01/27/20 244 lb 14.9 oz (111.1 kg)     Body mass index is 29.48 kg/m². CBC:   Lab Results   Component Value Date    WBC 6.8 04/29/2020    RBC 4.52 04/29/2020    HGB 14.5 04/29/2020    HCT 41.5 04/29/2020    MCV 91.9 04/29/2020    RDW 15.0 04/29/2020     04/29/2020       CMP:   Lab Results   Component Value Date     04/29/2020    K 4.0 04/29/2020     04/29/2020    CO2 25 04/29/2020    BUN 17 04/29/2020    CREATININE 1.1 04/29/2020    GFRAA >60 04/29/2020    AGRATIO 1.5 04/27/2020    LABGLOM >60 04/29/2020    GLUCOSE 119 04/29/2020    PROT 6.1 04/27/2020    CALCIUM 9.7 04/29/2020    BILITOT 0.9 04/27/2020    ALKPHOS 88 04/27/2020    AST 18 04/27/2020    ALT 31 04/27/2020       POC Tests: No results for input(s): POCGLU, POCNA, POCK, POCCL, POCBUN, POCHEMO, POCHCT in the last 72 hours.     Coags:   Lab Results   Component Value Date    PROTIME 12.0 04/24/2020    INR 1.03 04/24/2020       HCG (If Applicable): No results found for: PREGTESTUR, PREGSERUM, HCG, HCGQUANT     ABGs: No results found for: PHART, PO2ART, KYF7SQW, IMY6MYA, BEART, J3MZNHCJ     Type & Screen (If Applicable):  No results found for: LABABO, [address]    Anesthesia Evaluation  Patient summary reviewed and Nursing notes reviewed no history of anesthetic complications:   Airway: Mallampati: II  TM distance: >3 FB   Neck ROM: full  Mouth opening: > = 3 FB Dental:          Pulmonary:   (+) sleep apnea:                             Cardiovascular:    (+) hypertension:,                   Neuro/Psych:   (+) CVA:, psychiatric history:            GI/Hepatic/Renal: Neg GI/Hepatic/Renal ROS            Endo/Other: Negative Endo/Other ROS                    Abdominal:           Vascular:                                        Anesthesia Plan      general     ASA 1    (21-year-old male presents for lumbar puncture under anesthesia. Plan general anesthesia with ASA standard monitors. Questions answered. Patient agreeable with anesthetic plan.  )  Induction: intravenous. Anesthetic plan and risks discussed with patient. Plan discussed with CRNA.     Attending anesthesiologist reviewed and agrees with Pre Eval content              Joceline Ngo MD   4/29/2020

## 2020-04-29 NOTE — H&P
injection, PRN  0.9 % sodium chloride infusion, Continuous PRN          ASA 2 - Patient with mild systemic disease with no functional limitations    II (soft palate, uvula, fauces visible)    Activity:  2 - Able to move 4 extremities voluntarily on command  Respiration:  2 - Able to breathe deeply and cough freely  Circulation:  2 - BP+/- 20mmHg of normal  Consciousness:  2 - Fully awake  Oxygen Saturation (color):  2 - Able to maintain oxygen saturation >92% on room air    Sedation : Moderate sedation planned

## 2020-04-29 NOTE — PROGRESS NOTES
CONTINUOUS VIDEO EEG MONITORING    DATE OF SERVICE: 4/28/2020 18:59 TO 4/29/2020 18:59    NAME: Yonas Serrato   YOB: 1965  SEX: male  MEDICAL RECORD [id number]    EEG-CCTV study:   The patient is a 48 y.o.y old male monitored at the request of the team for altered mental status and concern for subclinical seizures. EEG-VIDEO MONITORING METHODOLOGY:   Time-locked EEG-video monitoring was performed using the 32-channel [employer] monitoring system. Analyses of the monitoring data were performed using the following techniques:   1. Review of the relevant EEG-video data. 2. Review of events detected by the computer system in detail. 3. Review of clinical seizures, with both detailed review of EEG and video and playback using multiple montages. A variety of referential and bipolar montages were used. CLINICAL AND EEG ANALYSIS:  Video EEG recording was reviewed in real time by a technologist, and then reviewed at least twice a day when available on the  with maximum possible sampling. Results of the monitoring were related to the treating team frequently throughout the study, at least once a day to help guide treatment via verbal or written communication as brief notes or direct text messages or emails. Updates and response to treatment was communicated as requested by the requesting physician or the team.    4/28/2020-4/29/2020      Seizures - none  Push buttons - none  Background - continuous generalized mixed frequencies. Posterior frequencies up to 6-7 hz.  Intermixed generalized delta. Some sleep morphology. CLINICAL INTERPRETATION: This is a normal video EEG study  1. Generalized background abnormalities indicative of an underlying diffuse encephalopathy of non-specific etiology No focal or epileptiform abnormalities. No seizures. No push button events.

## 2020-04-30 LAB
ANION GAP SERPL CALCULATED.3IONS-SCNC: 17 MMOL/L (ref 3–16)
BASE EXCESS ARTERIAL: -2.4 MMOL/L (ref -3–3)
BUN BLDV-MCNC: 22 MG/DL (ref 7–20)
CALCIUM SERPL-MCNC: 9.2 MG/DL (ref 8.3–10.6)
CARBOXYHEMOGLOBIN ARTERIAL: 2.4 % (ref 0–1.5)
CHLORIDE BLD-SCNC: 101 MMOL/L (ref 99–110)
CO2: 19 MMOL/L (ref 21–32)
CREAT SERPL-MCNC: 0.9 MG/DL (ref 0.9–1.3)
GFR AFRICAN AMERICAN: >60
GFR NON-AFRICAN AMERICAN: >60
GLUCOSE BLD-MCNC: 126 MG/DL (ref 70–99)
HAV IGM SER IA-ACNC: NORMAL
HCO3 ARTERIAL: 21 MMOL/L (ref 21–29)
HCT VFR BLD CALC: 41.1 % (ref 40.5–52.5)
HEMOGLOBIN, ART, EXTENDED: 14.4 G/DL
HEMOGLOBIN: 14.4 G/DL (ref 13.5–17.5)
HEPATITIS B CORE IGM ANTIBODY: NORMAL
HEPATITIS B SURFACE ANTIGEN INTERPRETATION: NORMAL
HEPATITIS C ANTIBODY INTERPRETATION: NORMAL
MCH RBC QN AUTO: 32 PG (ref 26–34)
MCHC RBC AUTO-ENTMCNC: 35 G/DL (ref 31–36)
MCV RBC AUTO: 91.4 FL (ref 80–100)
METHEMOGLOBIN ARTERIAL: 0.3 % (ref 0–1.4)
O2 SAT, ARTERIAL: 98 % (ref 93–100)
PCO2 ARTERIAL: 32.9 MMHG (ref 35–45)
PDW BLD-RTO: 15.4 % (ref 12.4–15.4)
PH ARTERIAL: 7.42 (ref 7.35–7.45)
PLATELET # BLD: 162 K/UL (ref 135–450)
PMV BLD AUTO: 9.9 FL (ref 5–10.5)
PO2 ARTERIAL: 90.4 MMHG (ref 75–108)
POTASSIUM REFLEX MAGNESIUM: 3.8 MMOL/L (ref 3.5–5.1)
RBC # BLD: 4.49 M/UL (ref 4.2–5.9)
SODIUM BLD-SCNC: 137 MMOL/L (ref 136–145)
TCO2 ARTERIAL: 22 MMOL/L
VALPROIC ACID LEVEL: 84.2 UG/ML (ref 50–100)
WBC # BLD: 5.2 K/UL (ref 4–11)

## 2020-04-30 PROCEDURE — 2500000003 HC RX 250 WO HCPCS: Performed by: NURSE PRACTITIONER

## 2020-04-30 PROCEDURE — 95813 EEG EXTND MNTR 61-119 MIN: CPT

## 2020-04-30 PROCEDURE — 87390 HIV-1 AG IA: CPT

## 2020-04-30 PROCEDURE — 2580000003 HC RX 258: Performed by: NURSE PRACTITIONER

## 2020-04-30 PROCEDURE — 6360000002 HC RX W HCPCS: Performed by: NURSE PRACTITIONER

## 2020-04-30 PROCEDURE — 86702 HIV-2 ANTIBODY: CPT

## 2020-04-30 PROCEDURE — 2580000003 HC RX 258: Performed by: STUDENT IN AN ORGANIZED HEALTH CARE EDUCATION/TRAINING PROGRAM

## 2020-04-30 PROCEDURE — 85027 COMPLETE CBC AUTOMATED: CPT

## 2020-04-30 PROCEDURE — 82803 BLOOD GASES ANY COMBINATION: CPT

## 2020-04-30 PROCEDURE — 80074 ACUTE HEPATITIS PANEL: CPT

## 2020-04-30 PROCEDURE — 80164 ASSAY DIPROPYLACETIC ACD TOT: CPT

## 2020-04-30 PROCEDURE — 86701 HIV-1ANTIBODY: CPT

## 2020-04-30 PROCEDURE — 6370000000 HC RX 637 (ALT 250 FOR IP): Performed by: STUDENT IN AN ORGANIZED HEALTH CARE EDUCATION/TRAINING PROGRAM

## 2020-04-30 PROCEDURE — 2060000000 HC ICU INTERMEDIATE R&B

## 2020-04-30 PROCEDURE — 80048 BASIC METABOLIC PNL TOTAL CA: CPT

## 2020-04-30 RX ADMIN — Medication 10 ML: at 00:13

## 2020-04-30 RX ADMIN — VALPROATE SODIUM 500 MG: 100 INJECTION, SOLUTION INTRAVENOUS at 21:15

## 2020-04-30 RX ADMIN — LORAZEPAM 0.5 MG: 2 INJECTION INTRAMUSCULAR; INTRAVENOUS at 00:08

## 2020-04-30 RX ADMIN — LORAZEPAM 0.5 MG: 2 INJECTION INTRAMUSCULAR; INTRAVENOUS at 14:10

## 2020-04-30 RX ADMIN — LORAZEPAM 0.5 MG: 2 INJECTION INTRAMUSCULAR; INTRAVENOUS at 05:45

## 2020-04-30 RX ADMIN — ACETAMINOPHEN 975 MG: 650 SUPPOSITORY RECTAL at 06:58

## 2020-04-30 RX ADMIN — ACETAMINOPHEN 975 MG: 325 SUPPOSITORY RECTAL at 00:07

## 2020-04-30 RX ADMIN — LORAZEPAM 0.5 MG: 2 INJECTION INTRAMUSCULAR; INTRAVENOUS at 22:24

## 2020-04-30 RX ADMIN — VALPROATE SODIUM 1550 MG: 100 INJECTION, SOLUTION INTRAVENOUS at 12:15

## 2020-04-30 RX ADMIN — THIAMINE HYDROCHLORIDE: 100 INJECTION, SOLUTION INTRAMUSCULAR; INTRAVENOUS at 09:40

## 2020-04-30 RX ADMIN — Medication 10 ML: at 21:16

## 2020-04-30 RX ADMIN — SODIUM CHLORIDE 500 ML: 9 INJECTION, SOLUTION INTRAVENOUS at 00:08

## 2020-04-30 RX ADMIN — Medication 10 ML: at 09:39

## 2020-04-30 ASSESSMENT — PAIN SCALES - GENERAL
PAINLEVEL_OUTOF10: 0

## 2020-04-30 ASSESSMENT — PAIN SCALES - PAIN ASSESSMENT IN ADVANCED DEMENTIA (PAINAD)
FACIALEXPRESSION: 0
TOTALSCORE: 0
FACIALEXPRESSION: 0
BREATHING: 0
NEGVOCALIZATION: 0
FACIALEXPRESSION: 0
CONSOLABILITY: 0
TOTALSCORE: 0
NEGVOCALIZATION: 0
BODYLANGUAGE: 0
BODYLANGUAGE: 0
CONSOLABILITY: 0
BREATHING: 0
TOTALSCORE: 0
CONSOLABILITY: 0
BODYLANGUAGE: 0
BREATHING: 0
NEGVOCALIZATION: 0

## 2020-04-30 NOTE — PROGRESS NOTES
Pt rectal temp 103.6 despite administration of tyelenol at 1815 for axillary temp of 101.2. Ice packs applied to patient. On-call resident notified. Orders received for STAT blood cultures, STAT CBC, STAT BMP STAT Lactic, STAT CXR, STAT Urine reflex to culture. Will continue to monitor.  Electronically signed by Keon Oliveira RN on 4/30/2020 at 1:47 AM

## 2020-04-30 NOTE — PROGRESS NOTES
CONTINUOUS EEG    Name:  Eliza Walker  Medical Record Number:  [id number]  Age: 48 y.o. Gender: male  : 1965  Today's Date:  2020  Room:  [address] [place], [place] [address]-  Vital Signs   BP (!) 136/90   Pulse 82   Temp 98.1 °F (36.7 °C) (Oral)   Resp 18   Ht 6' (1.829 m)   Wt 217 lb 6 oz (98.6 kg)   SpO2 99%   BMI 29.48 kg/m²       Patient currently on continuous EEG monitoring. All EEG leads are currently in place with no current issues. Verified [employer] connection via team viewer. Checked in with patient RN for current plan of care. Comments: Multiple leads fixed. Continue monitoring. All leads within 10K limit.     Electronically signed by Jeffrey Weston on 2020 at 7:43 PM

## 2020-04-30 NOTE — PROGRESS NOTES
CONTINUOUS EEG    Name:  Alexey Julien  Medical Record Number:  [id number]  Age: 48 y.o. Gender: male  : 1965  Today's Date:  2020  Room:  [facility]4455-  Vital Signs   BP (!) 148/96   Pulse 108   Temp 103.6 °F (39.8 °C) (Rectal)   Resp 20   Ht 6' (1.829 m)   Wt 217 lb 6 oz (98.6 kg)   SpO2 97%   BMI 29.48 kg/m²       Patient currently on continuous EEG monitoring. All EEG leads are currently in place with no current issues. Verified [employer] connection via team viewer. Checked in with patient RN for current plan of care. Comments: Continue monitoring. All leads within 10K limit.     Electronically signed by Alyssa Lambert on 2020 at 10:30 PM

## 2020-04-30 NOTE — PROGRESS NOTES
WBC 6.8 7.0 5.2   HGB 14.5 14.9 14.4   HCT 41.5 43.9 41.1    204 162   MCV 91.9 92.7 91.4     Renal:    Recent Labs     04/29/20  0523 04/29/20 2200 04/30/20  0345    136 137   K 4.0 4.0 3.8    97* 101   CO2 25 21 19*   BUN 17 19 22*   CREATININE 1.1 1.2 0.9   GLUCOSE 119* 127* 126*   CALCIUM 9.7 9.5 9.2   MG  --  2.20  --    PHOS  --  3.6  --    ANIONGAP 14 18* 17*     Hepatic:   Recent Labs     04/29/20 2200   LABALBU 4.2     Troponin: No results for input(s): TROPONINI in the last 72 hours. BNP: No results for input(s): BNP in the last 72 hours. Lipids: No results for input(s): CHOL, HDL in the last 72 hours. Invalid input(s): LDLCALCU, TRIGLYCERIDE  ABGs:  No results for input(s): PHART, OMN4YXL, PO2ART, RPP8PNA, BEART, THGBART, J6PUYSDK, GGY4PSM in the last 72 hours. INR: No results for input(s): INR in the last 72 hours. Lactate: No results for input(s): LACTATE in the last 72 hours. Cultures:  -----------------------------------------------------------------  RAD:   XR CHEST PORTABLE   Final Result      No acute pulmonary disease. IR LUMBAR PUNCTURE FOR DIAGNOSIS   Final Result      MRI BRAIN W WO CONTRAST   Final Result      1. Severely limited due to motion artifact. 2. No acute stroke, mass, or hemorrhage. 3. Severe confluent hyperintense FLAIR signal within the cerebral white matter, progressed compared to the prior study, nonspecific and could represent  acute toxic/metabolic leukoencephalopathy. Hypoxic ischemic encephalopathy is considered less likely    given the predominant white matter findings. 4. Symmetric foci of hyperintense T2/flair signal foci within the globus pallidus, suggestive of carbon monoxide poisoning. This is unchanged from the prior study. Given the lack of diffusion restriction, this is probably subacute to chronic.       FL LUMBAR PUNCTURE DIAG   Final Result   Impression: Extensive fluoroscopically guided lumbar puncture was unsuccessful secondary to patient condition, altered mental status and constant movement. The patient was aborted due to safety concerns. The procedure can be attempted under anesthesia if    clinically appropriate. Assessment/Plan:     Acute encephalopathy, unclear etiology From the review of notes, medical workup has not revealed a cause for AMS. Positive UDS with amphetamines and benzos however that does not explain the mental status change since his admission to [address] [place] [address] has been over a week. MRI done of brain showed a left parietal lobe infarct which was acute in nature. Neurology followed the patient at [address] [place] [address] and did not think that his mental status changes were related to the parietal lobe infarct. Transferred to [facility] for cvEEG.  -cvEEG  -Neurology following  -Repeat LP  -MRI w/wo con  -scheduled ativan 0.5 mg q8h for possible xanax withdrawal, will taper  -Psych recommendations at [facility]: Seroquel if needed for agitation. Avoid benzos. The working diagnosis from them was perhaps delirium however to rule out medical causes first.      Acute left parietal stroke - Unclear if he has memory of receptive deficits. Speech is garbled. He is not alert for a proper exam. HbA1c 5.3, LDL 94, .  -Continue aspirin 81mg, atorvastatin 80mg  -SCDs for DVT prophylaxis  -Neuro following  -q4h neuro checks  -Studies as above  -PT/OT/SLP     HTN  -Resumed lisinopril 10 mg daily     Agitation  -PRN seroquel 25 mg BID.  Was ordered four times PRN but reduced to BID    Dispo  - CW following: home with home care vs SNF  -COVID-19 status: negative    Code Status: Full code  FEN: NPO  PPX: Lovenox held for LP, SCDs  DISPO: ELIZABETH Pedro MD, PGY-1  04/30/20  8:44 AM    This patient has been staffed and discussed with Kory Galvan MD.

## 2020-04-30 NOTE — PROGRESS NOTES
non-specific etiology  3. Periods of state change/stimulation with development of generalized periodic discharges. Generalized periodic epileptiform discharges (GPDs) are an electroencephalographic pattern indicative of underlying diffuse cortical excitability.

## 2020-04-30 NOTE — PROGRESS NOTES
CONTINUOUS EEG    Name:  Laura Knox  Medical Record Number:  [id number]  Age: 48 y.o. Gender: male  : 1965  Today's Date:  2020  Room:  [facility]/4455-01  Vital Signs   /80   Pulse 85   Temp 98.3 °F (36.8 °C) (Oral)   Resp 19   Ht 6' (1.829 m)   Wt 217 lb 6 oz (98.6 kg)   SpO2 93%   BMI 29.48 kg/m²       Patient currently on continuous EEG monitoring. All EEG leads are currently in place with no current issues. Verified [employer] connection via team viewer. Checked in with patient RN for current plan of care. Comments: Continue monitoring. All leads within 10K limit.     Electronically signed by Demetris Collins on 2020 at 3:03 PM

## 2020-05-01 ENCOUNTER — APPOINTMENT (OUTPATIENT)
Dept: GENERAL RADIOLOGY | Age: 55
DRG: 052 | End: 2020-05-01
Attending: INTERNAL MEDICINE
Payer: COMMERCIAL

## 2020-05-01 LAB
ANION GAP SERPL CALCULATED.3IONS-SCNC: 14 MMOL/L (ref 3–16)
BUN BLDV-MCNC: 22 MG/DL (ref 7–20)
CALCIUM SERPL-MCNC: 9.4 MG/DL (ref 8.3–10.6)
CHLORIDE BLD-SCNC: 101 MMOL/L (ref 99–110)
CO2: 25 MMOL/L (ref 21–32)
CREAT SERPL-MCNC: 0.9 MG/DL (ref 0.9–1.3)
CSF CULTURE: NORMAL
GFR AFRICAN AMERICAN: >60
GFR NON-AFRICAN AMERICAN: >60
GLUCOSE BLD-MCNC: 107 MG/DL (ref 70–99)
GRAM STAIN RESULT: NORMAL
HCT VFR BLD CALC: 43.3 % (ref 40.5–52.5)
HEMOGLOBIN: 15 G/DL (ref 13.5–17.5)
HIV AG/AB: NORMAL
HIV ANTIGEN: NORMAL
HIV-1 ANTIBODY: NORMAL
HIV-2 AB: NORMAL
MCH RBC QN AUTO: 31.8 PG (ref 26–34)
MCHC RBC AUTO-ENTMCNC: 34.7 G/DL (ref 31–36)
MCV RBC AUTO: 91.7 FL (ref 80–100)
MISCELLANEOUS LAB TEST ORDER: ABNORMAL
PDW BLD-RTO: 15.5 % (ref 12.4–15.4)
PLATELET # BLD: 180 K/UL (ref 135–450)
PMV BLD AUTO: 9.9 FL (ref 5–10.5)
POTASSIUM REFLEX MAGNESIUM: 4.1 MMOL/L (ref 3.5–5.1)
RBC # BLD: 4.72 M/UL (ref 4.2–5.9)
SODIUM BLD-SCNC: 140 MMOL/L (ref 136–145)
WBC # BLD: 6.1 K/UL (ref 4–11)

## 2020-05-01 PROCEDURE — 2580000003 HC RX 258: Performed by: NURSE PRACTITIONER

## 2020-05-01 PROCEDURE — 2580000003 HC RX 258: Performed by: STUDENT IN AN ORGANIZED HEALTH CARE EDUCATION/TRAINING PROGRAM

## 2020-05-01 PROCEDURE — 85027 COMPLETE CBC AUTOMATED: CPT

## 2020-05-01 PROCEDURE — 80048 BASIC METABOLIC PNL TOTAL CA: CPT

## 2020-05-01 PROCEDURE — 2500000003 HC RX 250 WO HCPCS: Performed by: NURSE PRACTITIONER

## 2020-05-01 PROCEDURE — 51798 US URINE CAPACITY MEASURE: CPT

## 2020-05-01 PROCEDURE — 6360000002 HC RX W HCPCS: Performed by: NURSE PRACTITIONER

## 2020-05-01 PROCEDURE — 71045 X-RAY EXAM CHEST 1 VIEW: CPT

## 2020-05-01 PROCEDURE — 95813 EEG EXTND MNTR 61-119 MIN: CPT

## 2020-05-01 PROCEDURE — 2060000000 HC ICU INTERMEDIATE R&B

## 2020-05-01 PROCEDURE — 6370000000 HC RX 637 (ALT 250 FOR IP): Performed by: STUDENT IN AN ORGANIZED HEALTH CARE EDUCATION/TRAINING PROGRAM

## 2020-05-01 RX ORDER — ATORVASTATIN CALCIUM 10 MG/1
10 TABLET, FILM COATED ORAL DAILY
Status: DISCONTINUED | OUTPATIENT
Start: 2020-05-02 | End: 2020-05-05

## 2020-05-01 RX ORDER — ONDANSETRON 2 MG/ML
4 INJECTION INTRAMUSCULAR; INTRAVENOUS EVERY 6 HOURS PRN
Status: DISCONTINUED | OUTPATIENT
Start: 2020-05-01 | End: 2020-05-09 | Stop reason: HOSPADM

## 2020-05-01 RX ORDER — LISINOPRIL 10 MG/1
10 TABLET ORAL DAILY
Status: DISCONTINUED | OUTPATIENT
Start: 2020-05-02 | End: 2020-05-09 | Stop reason: HOSPADM

## 2020-05-01 RX ORDER — POLYETHYLENE GLYCOL 3350 17 G/17G
17 POWDER, FOR SOLUTION ORAL DAILY PRN
Status: DISCONTINUED | OUTPATIENT
Start: 2020-05-01 | End: 2020-05-09 | Stop reason: HOSPADM

## 2020-05-01 RX ORDER — FAMOTIDINE 20 MG/1
20 TABLET, FILM COATED ORAL 2 TIMES DAILY
Status: DISCONTINUED | OUTPATIENT
Start: 2020-05-01 | End: 2020-05-04

## 2020-05-01 RX ORDER — QUETIAPINE FUMARATE 25 MG/1
25 TABLET, FILM COATED ORAL 2 TIMES DAILY PRN
Status: DISCONTINUED | OUTPATIENT
Start: 2020-05-01 | End: 2020-05-09 | Stop reason: HOSPADM

## 2020-05-01 RX ORDER — SODIUM CHLORIDE 9 MG/ML
INJECTION, SOLUTION INTRAVENOUS CONTINUOUS
Status: DISCONTINUED | OUTPATIENT
Start: 2020-05-01 | End: 2020-05-08

## 2020-05-01 RX ORDER — PROMETHAZINE HYDROCHLORIDE 25 MG/1
12.5 TABLET ORAL EVERY 6 HOURS PRN
Status: DISCONTINUED | OUTPATIENT
Start: 2020-05-01 | End: 2020-05-09 | Stop reason: HOSPADM

## 2020-05-01 RX ADMIN — VALPROATE SODIUM 500 MG: 100 INJECTION, SOLUTION INTRAVENOUS at 11:47

## 2020-05-01 RX ADMIN — LORAZEPAM 0.5 MG: 2 INJECTION INTRAMUSCULAR; INTRAVENOUS at 22:07

## 2020-05-01 RX ADMIN — VALPROATE SODIUM 500 MG: 100 INJECTION, SOLUTION INTRAVENOUS at 04:28

## 2020-05-01 RX ADMIN — LORAZEPAM 0.5 MG: 2 INJECTION INTRAMUSCULAR; INTRAVENOUS at 06:18

## 2020-05-01 RX ADMIN — Medication 10 ML: at 09:32

## 2020-05-01 RX ADMIN — VALPROATE SODIUM 500 MG: 100 INJECTION, SOLUTION INTRAVENOUS at 20:51

## 2020-05-01 RX ADMIN — SODIUM CHLORIDE: 9 INJECTION, SOLUTION INTRAVENOUS at 17:49

## 2020-05-01 RX ADMIN — FAMOTIDINE 20 MG: 20 TABLET ORAL at 22:07

## 2020-05-01 RX ADMIN — LORAZEPAM 0.5 MG: 2 INJECTION INTRAMUSCULAR; INTRAVENOUS at 14:16

## 2020-05-01 RX ADMIN — THIAMINE HYDROCHLORIDE: 100 INJECTION, SOLUTION INTRAMUSCULAR; INTRAVENOUS at 09:32

## 2020-05-01 ASSESSMENT — PAIN SCALES - WONG BAKER
WONGBAKER_NUMERICALRESPONSE: 0

## 2020-05-01 ASSESSMENT — PAIN SCALES - GENERAL
PAINLEVEL_OUTOF10: 0

## 2020-05-01 NOTE — PROGRESS NOTES
Meds:perflutren lipid microspheres, HYDROmorphone, HYDROmorphone, morphine, HYDROmorphone, labetalol, hydrALAZINE, meperidine, QUEtiapine, sodium chloride flush, acetaminophen **OR** acetaminophen, polyethylene glycol, promethazine **OR** ondansetron    Objective:   Vitals:   T-max:  Patient Vitals for the past 8 hrs:   BP Temp Temp src Pulse Resp SpO2 Weight   05/01/20 0732 114/79 98.5 °F (36.9 °C) Axillary 85 16 98 % --   05/01/20 0600 (!) 127/100 -- -- -- -- -- --   05/01/20 0400 (!) 132/90 99.1 °F (37.3 °C) Axillary 80 18 100 % --   05/01/20 0300 -- -- -- -- -- -- 215 lb 13.3 oz (97.9 kg)   05/01/20 0200 131/83 -- -- 89 -- -- --       Intake/Output Summary (Last 24 hours) at 5/1/2020 0800  Last data filed at 5/1/2020 0515  Gross per 24 hour   Intake 0 ml   Output 900 ml   Net -900 ml       Review of Systems   Unable to perform ROS: Mental status change     Physical Exam  Vitals signs and nursing note reviewed. Constitutional:       General: He is not in acute distress. Appearance: He is not diaphoretic. Comments: Lethargic   HENT:      Head: Normocephalic and atraumatic. Nose: Nose normal. No rhinorrhea. Eyes:      General: No scleral icterus. Conjunctiva/sclera: Conjunctivae normal.      Pupils: Pupils are equal, round, and reactive to light. Neck:      Musculoskeletal: Normal range of motion and neck supple. Cardiovascular:      Rate and Rhythm: Normal rate and regular rhythm. Heart sounds: No friction rub. No gallop. Pulmonary:      Effort: Pulmonary effort is normal. No respiratory distress. Breath sounds: No stridor. No rhonchi. Chest:      Chest wall: No tenderness. Abdominal:      General: Bowel sounds are normal. There is no distension. Palpations: Abdomen is soft. Tenderness: There is no abdominal tenderness. Musculoskeletal:         General: No tenderness. Right lower leg: No edema. Left lower leg: No edema.    Skin:     General: Skin is warm and dry. Coloration: Skin is not jaundiced or pale. Neurological:      Comments: Pt awake with garbled speech. Doesn't follow commands. Does not verbalize at this time. Psychiatric:      Comments: Unable to assess         LABS:    CBC:   Recent Labs     04/29/20  2322 04/30/20 0345 05/01/20  0320   WBC 7.0 5.2 6.1   HGB 14.9 14.4 15.0   HCT 43.9 41.1 43.3    162 180   MCV 92.7 91.4 91.7     Renal:    Recent Labs     04/29/20 2200 04/30/20 0345 05/01/20  0320    137 140   K 4.0 3.8 4.1   CL 97* 101 101   CO2 21 19* 25   BUN 19 22* 22*   CREATININE 1.2 0.9 0.9   GLUCOSE 127* 126* 107*   CALCIUM 9.5 9.2 9.4   MG 2.20  --   --    PHOS 3.6  --   --    ANIONGAP 18* 17* 14     Hepatic:   Recent Labs     04/29/20 2200   LABALBU 4.2     Troponin: No results for input(s): TROPONINI in the last 72 hours. BNP: No results for input(s): BNP in the last 72 hours. Lipids: No results for input(s): CHOL, HDL in the last 72 hours. Invalid input(s): LDLCALCU, TRIGLYCERIDE  ABGs:    Recent Labs     04/30/20  0940   PHART 7.418   ITD5LQF 32.9*   PO2ART 90.4   LLK2IUF 21   BEART -2.4   D1HPAYGX 98   MUU9ERB 22       INR: No results for input(s): INR in the last 72 hours. Lactate: No results for input(s): LACTATE in the last 72 hours. Cultures:  -----------------------------------------------------------------  RAD:   XR CHEST PORTABLE   Final Result      No acute pulmonary disease. IR LUMBAR PUNCTURE FOR DIAGNOSIS   Final Result      MRI BRAIN W WO CONTRAST   Final Result      1. Severely limited due to motion artifact. 2. No acute stroke, mass, or hemorrhage. 3. Severe confluent hyperintense FLAIR signal within the cerebral white matter, progressed compared to the prior study, nonspecific and could represent  acute toxic/metabolic leukoencephalopathy. Hypoxic ischemic encephalopathy is considered less likely    given the predominant white matter findings.     4. Symmetric foci of

## 2020-05-01 NOTE — PLAN OF CARE
Problem: Falls - Risk of:  Goal: Will remain free from falls  Description: Will remain free from falls  Outcome: Ongoing     Problem: Restraint Use - Nonviolent/Non-Self-Destructive Behavior:  Goal: Absence of restraint-related injury  Description: Absence of restraint-related injury  Outcome: Ongoing     Problem: Respiratory:  Goal: Ability to maintain a clear airway will improve  Description: Ability to maintain a clear airway will improve  Outcome: Ongoing

## 2020-05-01 NOTE — CARE COORDINATION
Case Management Assessment           Daily Note                 Date/ Time of Note: 5/1/2020 10:31 AM         Note completed by: Jani Byrd    Patient Name: Jaren Jarrett  YOB: 1965    Diagnosis:Metabolic encephalopathy [P73.57]  AMS (altered mental status) [R41.82]  Patient Admission Status: Inpatient    Date of Admission:4/28/2020  4:45 AM Length of Stay: 3 GLOS:      Current Plan of Care:   ________________________________________________________________________________________  PT/OT AM-PAC:   / 24 per last evaluation on: NOT ORDERED        DME Needs for discharge: TBD  ________________________________________________________________________________________  Discharge Plan: Home with homecare vs SNF? Tentative discharge date: TBD    Current barriers to discharge: restraints, cEEG    Referrals completed: Not Applicable    Resources/ information provided: Not indicated at this time  ________________________________________________________________________________________  Case Management Notes:    SW continuing to follow for DCP. Patient continues in restraints and cEEG. PT/OT evaluations will be helpful when appropriate for safe next level of care. Plan was for patient to return home with shruthi. ADDENDUM 11:35 AM  PT/OT ordered requested and placed.  left with patient gonzalezbrannon to discuss SNF choices. Oral and his family were provided with choice of provider; he and his family are in agreement with the discharge plan.     Care Transition Patient: ANNMARIE Choi, [facility]  [facility]  Case Management Department  Ph: [phone]

## 2020-05-01 NOTE — CONSULTS
NUTRITION ASSESSMENT  Admission Date: 4/28/2020     Type and Reason for Visit: Consult    NUTRITION RECOMMENDATIONS:     ENTERAL NUTRITION  1. Recommend order \"Diet: Tube feed continuous/ NPO\". Initiate Jevity 1.2 formula at 20 mL/hr and as tolerated, increase by 20 mL/hr q 4 hours until goal of 70 mL/hr is met via N/G.  2. Suggest 110 mL H20 q 4 hours for 100% fluid recommendations when no IVF. 3. Monitor for tolerance (bowel habits, N/V, cramping, abdominal distention). NUTRITION ASSESSMENT:  Patient with high nutrition risk as he has been NPO x7 days (pt at [facility] prior to admit to [facility]). Patient admitted with AMS and continues to be lethargic and unsafe for po. Noted pt with significant wt loss over the past three months and one week as well. RD consulted for EN ordering and management. RD will provide recommendations and continue to monitor nutritional status.      MALNUTRITION ASSESSMENT  Context: Acute illness or injury   Malnutrition Status: Meets the criteria for moderate malnutrition  Findings of the 6 clinical characteristics of malnutrition (Minimum of 2 out of 6 clinical characteristics is required to make the diagnosis of moderate or severe Protein Calorie Malnutrition based on AND/ASPEN Guidelines):  Energy Intake %: Less than or equal to 75% of estimated energy requirement  Energy Intake Time: Greater than or equal to 5 days  Interpretation of Weight Loss %: 2% loss or greater  Interpretation of Weight Loss Time: in 1 week  Body Fat Status: Unable to assess  Muscle Mass Status: Unable to assess  Fluid Accumulation Status: No significant fluid accumulation  Reduced  Strength: Not measured    NUTRITION DIAGNOSIS   Problem: Inadequate Oral Intake  Etiology: Cognitive or neurological impairment  Signs & Symptoms: NPO status due to medical condition    NUTRITION INTERVENTION  Food and/or Nutrient Delivery:Continue NPO or Start Tube Feeding   Nutrition education/counseling/coordination of care: Continue Inpatient Monitoring     NUTRITION MONITORING & EVALUATION:  Evaluation:Goals set   Goals: Patient will tolerate EN to provide 100% of nutrition needs  Monitoring: Mental Status/Confusion , Nutrition Progression  or TF Tolerance      OBJECTIVE DATA:  · Nutrition-Focused Physical Findings: LBM 4/27  · Wounds None      Past Medical History:   Diagnosis Date    ADHD     Arthritis     Bipolar 1 disorder ([facility] [address]     Depression     Hypertension     Sleep apnea         ANTHROPOMETRICS  Current Height: 6' (182.9 cm)  Current Weight: 215 lb 13.3 oz (97.9 kg)    Admission weight: 217 lb 6 oz (98.6 kg)  Ideal Bodyweight 178 lb (80 kg)  Weight Changes 3.5% loss ~1 week, 11% loss ~3m      BMI BMI (Calculated): 29.3    Wt Readings from Last 50 Encounters:   05/01/20 215 lb 13.3 oz (97.9 kg)   04/26/20 223 lb 8.7 oz (101.4 kg)   01/27/20 244 lb 14.9 oz (111.1 kg)   01/09/20 245 lb (111.1 kg)   01/05/20 245 lb 6 oz (111.3 kg)   01/02/20 246 lb 7.6 oz (111.8 kg)   01/01/20 246 lb 7.6 oz (111.8 kg)   12/01/19 225 lb (102.1 kg)   11/26/19 228 lb 13.4 oz (103.8 kg)   11/12/19 228 lb 13.4 oz (103.8 kg)   11/08/19 228 lb 13.4 oz (103.8 kg)   11/04/19 227 lb (103 kg)   11/04/19 227 lb (103 kg)   10/24/19 220 lb (99.8 kg)   10/15/19 227 lb (103 kg)   08/22/19 233 lb 14.5 oz (106.1 kg)   08/15/19 237 lb (107.5 kg)   06/16/19 234 lb 12.6 oz (106.5 kg)   03/08/19 244 lb 4.3 oz (110.8 kg)       COMPARATIVE STANDARDS  Estimated Total Kcals/Day : 18-22 Current Bodyweight (97 kg) 4421-0035 kcal    Estimated Total Protein (g/day) : 1.0-1.2 Current Bodyweight (97 kg)  g/day  Estimated Daily Total Fluid (ml/day): 1746 mL per day     Food / Nutrition-Related History  Pre-Admission / Home Diet:  Pre-Admission/Home Diet: General   Home Supplements / Herbals:    none noted  Food Restrictions / Cultural Requests:    none noted    Diet Orders / Intake / Nutrition Support  Current diet/supplement order: Diet NPO Effective Now     NSG

## 2020-05-02 LAB
ANION GAP SERPL CALCULATED.3IONS-SCNC: 13 MMOL/L (ref 3–16)
BUN BLDV-MCNC: 25 MG/DL (ref 7–20)
CALCIUM SERPL-MCNC: 9 MG/DL (ref 8.3–10.6)
CHLORIDE BLD-SCNC: 105 MMOL/L (ref 99–110)
CO2: 21 MMOL/L (ref 21–32)
CREAT SERPL-MCNC: 0.9 MG/DL (ref 0.9–1.3)
GFR AFRICAN AMERICAN: >60
GFR NON-AFRICAN AMERICAN: >60
GLUCOSE BLD-MCNC: 149 MG/DL (ref 70–99)
HCT VFR BLD CALC: 40.6 % (ref 40.5–52.5)
HEMOGLOBIN: 14.1 G/DL (ref 13.5–17.5)
MCH RBC QN AUTO: 31.5 PG (ref 26–34)
MCHC RBC AUTO-ENTMCNC: 34.6 G/DL (ref 31–36)
MCV RBC AUTO: 91.1 FL (ref 80–100)
PDW BLD-RTO: 15.1 % (ref 12.4–15.4)
PLATELET # BLD: 204 K/UL (ref 135–450)
PMV BLD AUTO: 9.9 FL (ref 5–10.5)
POTASSIUM REFLEX MAGNESIUM: 3.8 MMOL/L (ref 3.5–5.1)
RBC # BLD: 4.46 M/UL (ref 4.2–5.9)
SODIUM BLD-SCNC: 139 MMOL/L (ref 136–145)
WBC # BLD: 7.8 K/UL (ref 4–11)

## 2020-05-02 PROCEDURE — 95813 EEG EXTND MNTR 61-119 MIN: CPT

## 2020-05-02 PROCEDURE — U0003 INFECTIOUS AGENT DETECTION BY NUCLEIC ACID (DNA OR RNA); SEVERE ACUTE RESPIRATORY SYNDROME CORONAVIRUS 2 (SARS-COV-2) (CORONAVIRUS DISEASE [COVID-19]), AMPLIFIED PROBE TECHNIQUE, MAKING USE OF HIGH THROUGHPUT TECHNOLOGIES AS DESCRIBED BY CMS-2020-01-R: HCPCS

## 2020-05-02 PROCEDURE — 6370000000 HC RX 637 (ALT 250 FOR IP): Performed by: STUDENT IN AN ORGANIZED HEALTH CARE EDUCATION/TRAINING PROGRAM

## 2020-05-02 PROCEDURE — 2500000003 HC RX 250 WO HCPCS: Performed by: NURSE PRACTITIONER

## 2020-05-02 PROCEDURE — 2580000003 HC RX 258: Performed by: NURSE PRACTITIONER

## 2020-05-02 PROCEDURE — U0002 COVID-19 LAB TEST NON-CDC: HCPCS

## 2020-05-02 PROCEDURE — 2580000003 HC RX 258: Performed by: STUDENT IN AN ORGANIZED HEALTH CARE EDUCATION/TRAINING PROGRAM

## 2020-05-02 PROCEDURE — 80048 BASIC METABOLIC PNL TOTAL CA: CPT

## 2020-05-02 PROCEDURE — 6360000002 HC RX W HCPCS: Performed by: STUDENT IN AN ORGANIZED HEALTH CARE EDUCATION/TRAINING PROGRAM

## 2020-05-02 PROCEDURE — 2060000000 HC ICU INTERMEDIATE R&B

## 2020-05-02 PROCEDURE — 85027 COMPLETE CBC AUTOMATED: CPT

## 2020-05-02 PROCEDURE — 6360000002 HC RX W HCPCS: Performed by: NURSE PRACTITIONER

## 2020-05-02 RX ORDER — QUETIAPINE FUMARATE 25 MG/1
25 TABLET, FILM COATED ORAL 2 TIMES DAILY PRN
Qty: 60 TABLET | Refills: 0 | Status: SHIPPED | OUTPATIENT
Start: 2020-05-02 | End: 2020-05-08 | Stop reason: SDUPTHER

## 2020-05-02 RX ORDER — ASPIRIN 81 MG/1
81 TABLET, CHEWABLE ORAL DAILY
Status: DISCONTINUED | OUTPATIENT
Start: 2020-05-02 | End: 2020-05-05

## 2020-05-02 RX ADMIN — Medication 10 ML: at 21:22

## 2020-05-02 RX ADMIN — VALPROATE SODIUM 500 MG: 100 INJECTION, SOLUTION INTRAVENOUS at 20:00

## 2020-05-02 RX ADMIN — VALPROATE SODIUM 500 MG: 100 INJECTION, SOLUTION INTRAVENOUS at 04:27

## 2020-05-02 RX ADMIN — LORAZEPAM 0.5 MG: 2 INJECTION INTRAMUSCULAR; INTRAVENOUS at 21:18

## 2020-05-02 RX ADMIN — FAMOTIDINE 20 MG: 20 TABLET ORAL at 21:18

## 2020-05-02 RX ADMIN — LISINOPRIL 10 MG: 10 TABLET ORAL at 10:00

## 2020-05-02 RX ADMIN — THIAMINE HYDROCHLORIDE: 100 INJECTION, SOLUTION INTRAMUSCULAR; INTRAVENOUS at 09:58

## 2020-05-02 RX ADMIN — SODIUM CHLORIDE: 9 INJECTION, SOLUTION INTRAVENOUS at 17:12

## 2020-05-02 RX ADMIN — VALPROATE SODIUM 500 MG: 100 INJECTION, SOLUTION INTRAVENOUS at 12:43

## 2020-05-02 RX ADMIN — LORAZEPAM 0.5 MG: 2 INJECTION INTRAMUSCULAR; INTRAVENOUS at 13:55

## 2020-05-02 RX ADMIN — ATORVASTATIN CALCIUM 10 MG: 10 TABLET, FILM COATED ORAL at 10:00

## 2020-05-02 RX ADMIN — LORAZEPAM 0.5 MG: 2 INJECTION INTRAMUSCULAR; INTRAVENOUS at 06:32

## 2020-05-02 RX ADMIN — FAMOTIDINE 20 MG: 20 TABLET ORAL at 10:00

## 2020-05-02 RX ADMIN — TAMSULOSIN HYDROCHLORIDE 0.4 MG: 0.4 CAPSULE ORAL at 21:18

## 2020-05-02 RX ADMIN — ASPIRIN 81 MG 81 MG: 81 TABLET ORAL at 10:00

## 2020-05-02 RX ADMIN — ACETAMINOPHEN 650 MG: 325 TABLET ORAL at 21:18

## 2020-05-02 RX ADMIN — ENOXAPARIN SODIUM 40 MG: 40 INJECTION SUBCUTANEOUS at 13:54

## 2020-05-02 ASSESSMENT — PAIN SCALES - PAIN ASSESSMENT IN ADVANCED DEMENTIA (PAINAD)
CONSOLABILITY: 0
TOTALSCORE: 0
BODYLANGUAGE: 0
BODYLANGUAGE: 0
TOTALSCORE: 0
NEGVOCALIZATION: 0
BREATHING: 0
FACIALEXPRESSION: 0
FACIALEXPRESSION: 0
BODYLANGUAGE: 0
CONSOLABILITY: 0
TOTALSCORE: 0
BREATHING: 0
FACIALEXPRESSION: 0
NEGVOCALIZATION: 0
BODYLANGUAGE: 0
BREATHING: 0
NEGVOCALIZATION: 0
BREATHING: 0
FACIALEXPRESSION: 0
TOTALSCORE: 0
NEGVOCALIZATION: 0
CONSOLABILITY: 0
BODYLANGUAGE: 0
CONSOLABILITY: 0
BREATHING: 0
CONSOLABILITY: 0
NEGVOCALIZATION: 0
FACIALEXPRESSION: 0
TOTALSCORE: 0

## 2020-05-02 ASSESSMENT — PAIN SCALES - WONG BAKER: WONGBAKER_NUMERICALRESPONSE: 0

## 2020-05-02 ASSESSMENT — PAIN SCALES - GENERAL
PAINLEVEL_OUTOF10: 0

## 2020-05-02 NOTE — PROGRESS NOTES
Spoke to pt's dana/POA [employer] - [phone]) about pt's care and prognosis. Explained to her that pt will likely not return back to his former baseline and will require more care than can be provided at home so a facility would be the next step. Lalo Cristina voiced understanding and asked further questions, all of which were answered to her satisfaction. Goals of care were discussed with her and she was hesitant about whether pt would ever want to be resuscitated if he arrested however she requested to leave pt full code until she speaks to pt's mother and makes any changes. She reported that she will call back with any changes she wishes to make to pt's code status.

## 2020-05-02 NOTE — PROGRESS NOTES
Progress Note    Admit Date: 4/28/2020  Day: 5  Diet: DIET TUBE FEED CONTINUOUS/CYCLIC NPO; STANDARD WITH FIBER (Jevity 1.2); Nasogastric; Continuous; 20; 70; 24    CC: AMS    Interval history: No acute events overnight. Pt seen and examined at bedside this morning. He is sleeping and refuses to wake up at this time. He is tolerating his TFs and producing urine. Vitals: Afebrile since 4/30 AM (Tmax 103.6 on 4/29), BP 130s-130s/90s, HR 90s, SpO2 94% RA  I/Os: in 1.4, out 1L, net -739 cc since admission  Studies: LP does not appear infectious  CvEEG: No seizures but does show generalized background abnormalities and focal slowing of left temporal region, GPDs  Cultures: Blood, urine and CSF NGTD  Other: hepatitis negative, pending HIV  CXR: Negative    HPI: 47 yo M with PMH bipolar disorder, depression and ADHD presented to [facility] with AMS and multiple falls. UDS with amphetamines, benzos and marijuana. LP yielded minimal fluid for analysis and MRI showed acute left parietal infarct, however neuro did not think this was the cause of his encephalopathy. He was transferred to [facility] for cvEEG given the concern for status epilepticus.       Medications:     Scheduled Meds:   atorvastatin  10 mg Per NG tube Daily    lisinopril  10 mg Per NG tube Daily    famotidine  20 mg Per NG tube BID    valproate sodium (DEPACON) IVPB  500 mg Intravenous Q8H    aspirin  81 mg Oral Daily    tamsulosin  0.4 mg Oral Nightly    sodium chloride flush  10 mL Intravenous 2 times per day    folic acid, thiamine, multi-vitamin with vitamin K infusion   Intravenous Daily    LORazepam  0.5 mg Intravenous 3 times per day     Continuous Infusions:   sodium chloride 50 mL/hr at 05/01/20 9549     PRN Meds:QUEtiapine, polyethylene glycol, promethazine **OR** ondansetron, perflutren lipid microspheres, HYDROmorphone, HYDROmorphone, morphine, HYDROmorphone, labetalol, hydrALAZINE, meperidine, sodium chloride flush, acetaminophen **OR** acetaminophen    Objective:   Vitals:   T-max:  Patient Vitals for the past 8 hrs:   BP Temp Temp src Pulse Resp SpO2   05/02/20 0413 (!) 135/95 99.6 °F (37.6 °C) Axillary 94 18 94 %       Intake/Output Summary (Last 24 hours) at 5/2/2020 0800  Last data filed at 5/2/2020 9218  Gross per 24 hour   Intake 1425.08 ml   Output 1025 ml   Net 400.08 ml       Review of Systems   Unable to perform ROS: Mental status change     Physical Exam  Vitals signs and nursing note reviewed. Constitutional:       General: He is not in acute distress. Appearance: He is not diaphoretic. Comments: Lethargic   HENT:      Head: Normocephalic and atraumatic. Nose: Nose normal. No rhinorrhea. Eyes:      General: No scleral icterus. Conjunctiva/sclera: Conjunctivae normal.      Pupils: Pupils are equal, round, and reactive to light. Neck:      Musculoskeletal: Normal range of motion and neck supple. Cardiovascular:      Rate and Rhythm: Normal rate and regular rhythm. Heart sounds: No friction rub. No gallop. Pulmonary:      Effort: Pulmonary effort is normal. No respiratory distress. Breath sounds: No stridor. No rhonchi. Chest:      Chest wall: No tenderness. Abdominal:      General: Bowel sounds are normal. There is no distension. Palpations: Abdomen is soft. Tenderness: There is no abdominal tenderness. Musculoskeletal:         General: No tenderness. Right lower leg: No edema. Left lower leg: No edema. Skin:     General: Skin is warm and dry. Coloration: Skin is not jaundiced or pale. Neurological:      Comments: Sleeping comfortably. Does not verbalize at this time.    Psychiatric:      Comments: Unable to assess         LABS:    CBC:   Recent Labs     04/30/20  0345 05/01/20  0320 05/02/20  0420   WBC 5.2 6.1 7.8   HGB 14.4 15.0 14.1   HCT 41.1 43.3 40.6    180 204   MCV 91.4 91.7 91.1     Renal:    Recent Labs     04/29/20  2200 04/30/20  0345 05/01/20  0320 05/02/20  0420    137 140 139   K 4.0 3.8 4.1 3.8   CL 97* 101 101 105   CO2 21 19* 25 21   BUN 19 22* 22* 25*   CREATININE 1.2 0.9 0.9 0.9   GLUCOSE 127* 126* 107* 149*   CALCIUM 9.5 9.2 9.4 9.0   MG 2.20  --   --   --    PHOS 3.6  --   --   --    ANIONGAP 18* 17* 14 13     Hepatic:   Recent Labs     04/29/20  2200   LABALBU 4.2     Troponin: No results for input(s): TROPONINI in the last 72 hours. BNP: No results for input(s): BNP in the last 72 hours. Lipids: No results for input(s): CHOL, HDL in the last 72 hours. Invalid input(s): LDLCALCU, TRIGLYCERIDE  ABGs:    Recent Labs     04/30/20  0940   PHART 7.418   ASE0ISE 32.9*   PO2ART 90.4   MGL9JQV 21   BEART -2.4   I2FFGYSC 98   ASO7SNX 22       INR: No results for input(s): INR in the last 72 hours. Lactate: No results for input(s): LACTATE in the last 72 hours. Cultures:  -----------------------------------------------------------------  RAD:   XR CHEST PORTABLE   Final Result   Impression: Enteric tube terminates within the mid gastric body. Possible trace left effusion. XR CHEST PORTABLE   Final Result      No acute pulmonary disease. IR LUMBAR PUNCTURE FOR DIAGNOSIS   Final Result      MRI BRAIN W WO CONTRAST   Final Result      1. Severely limited due to motion artifact. 2. No acute stroke, mass, or hemorrhage. 3. Severe confluent hyperintense FLAIR signal within the cerebral white matter, progressed compared to the prior study, nonspecific and could represent  acute toxic/metabolic leukoencephalopathy. Hypoxic ischemic encephalopathy is considered less likely    given the predominant white matter findings. 4. Symmetric foci of hyperintense T2/flair signal foci within the globus pallidus, suggestive of carbon monoxide poisoning. This is unchanged from the prior study. Given the lack of diffusion restriction, this is probably subacute to chronic.       FL LUMBAR PUNCTURE DIAG   Final Result   Impression:

## 2020-05-02 NOTE — PLAN OF CARE
Problem: Falls - Risk of:  Goal: Will remain free from falls  Description: Will remain free from falls  Outcome: Ongoing     Problem: Restraint Use - Nonviolent/Non-Self-Destructive Behavior:  Goal: Absence of restraint indications  Description: Absence of restraint indications  Outcome: Ongoing     Problem: Restraint Use - Nonviolent/Non-Self-Destructive Behavior:  Goal: Absence of restraint-related injury  Description: Absence of restraint-related injury  Outcome: Ongoing     Problem: Safety:  Goal: Free from accidental physical injury  Description: Free from accidental physical injury  Outcome: Ongoing     Problem: Safety:  Goal: Free from intentional harm  Description: Free from intentional harm  Outcome: Ongoing     Problem: Pain:  Goal: Patient's pain/discomfort is manageable  Description: Patient's pain/discomfort is manageable  Outcome: Ongoing     Problem: Skin Integrity:  Goal: Skin integrity will stabilize  Description: Skin integrity will stabilize  Outcome: Ongoing     Problem: Verbal Communication - Impaired:  Goal: Functional communication will improve  Description: Functional communication will improve  Outcome: Ongoing     Problem: Verbal Communication - Impaired:  Goal: Ability to interact with others will improve  Description: Ability to interact with others will improve  Outcome: Ongoing     Problem: Nutrition  Goal: Optimal nutrition therapy  5/2/2020 0204 by Karl Masters RN  Outcome: Ongoing

## 2020-05-02 NOTE — PLAN OF CARE
Problem: Falls - Risk of:  Goal: Will remain free from falls  Description: Will remain free from falls  Outcome: Ongoing     Problem: Restraint Use - Nonviolent/Non-Self-Destructive Behavior:  Goal: Absence of restraint indications  Description: Absence of restraint indications  Outcome: Ongoing     Problem: Safety:  Goal: Free from accidental physical injury  Description: Free from accidental physical injury  Outcome: Ongoing     Problem: Verbal Communication - Impaired:  Goal: Functional communication will improve  Description: Functional communication will improve  Outcome: Ongoing     Problem: Pain:  Goal: Pain level will decrease  Description: Pain level will decrease  Outcome: Ongoing     Problem: Respiratory:  Goal: Ability to maintain a clear airway will improve  Description: Ability to maintain a clear airway will improve  Outcome: Ongoing     Problem: Respiratory:  Goal: Ability to maintain adequate ventilation will improve  Description: Ability to maintain adequate ventilation will improve  Outcome: Ongoing

## 2020-05-02 NOTE — PROGRESS NOTES
Spoke with patient's girlfriend Keyona and updated on plan of care. Keyona concerned that patient has been started on tube feed when he was eating meals up until a couple days ago. Explained to her that with patient's current mentation, he is at risk for aspiration due to not being able to follow directions. Keyona agreeable to keeping pt on TF at this time, however requesting MD to call her tomorrow to talk about plan of care. Sticky note placed to MD at this time.  Keyona satisfied at this time, will follow up in AM.

## 2020-05-02 NOTE — PROGRESS NOTES
CONTINUOUS EEG    Name:  Urmila Negro  Medical Record Number:  [id number]  Age: 48 y.o. Gender: male  : 1965  Today's Date:  2020  Room:  [address] [place] [address]-  Vital Signs   /81   Pulse 90   Temp 98.8 °F (37.1 °C) (Axillary)   Resp 18   Ht 6' (1.829 m)   Wt 215 lb 13.3 oz (97.9 kg)   SpO2 98%   BMI 29.27 kg/m²       Patient currently on continuous EEG monitoring. All EEG leads are currently in place with no current issues. Verified [facility] connection via team viewer. Checked in with patient RN for current plan of care. Comments: Continue monitoring. All leads within 10K limit.     Electronically signed by Jatin Bey on 2020 at 8:02 PM

## 2020-05-03 ENCOUNTER — APPOINTMENT (OUTPATIENT)
Dept: GENERAL RADIOLOGY | Age: 55
DRG: 052 | End: 2020-05-03
Attending: INTERNAL MEDICINE
Payer: COMMERCIAL

## 2020-05-03 LAB
ANION GAP SERPL CALCULATED.3IONS-SCNC: 13 MMOL/L (ref 3–16)
BASOPHILS ABSOLUTE: 0 K/UL (ref 0–0.2)
BASOPHILS RELATIVE PERCENT: 0.4 %
BLOOD CULTURE, ROUTINE: NORMAL
BUN BLDV-MCNC: 19 MG/DL (ref 7–20)
CALCIUM SERPL-MCNC: 8.7 MG/DL (ref 8.3–10.6)
CHLORIDE BLD-SCNC: 105 MMOL/L (ref 99–110)
CO2: 22 MMOL/L (ref 21–32)
CREAT SERPL-MCNC: 0.8 MG/DL (ref 0.9–1.3)
CULTURE, BLOOD 2: NORMAL
EOSINOPHILS ABSOLUTE: 0 K/UL (ref 0–0.6)
EOSINOPHILS RELATIVE PERCENT: 0.5 %
GFR AFRICAN AMERICAN: >60
GFR NON-AFRICAN AMERICAN: >60
GLUCOSE BLD-MCNC: 125 MG/DL (ref 70–99)
HCT VFR BLD CALC: 38.7 % (ref 40.5–52.5)
HEMOGLOBIN: 13.4 G/DL (ref 13.5–17.5)
LYMPHOCYTES ABSOLUTE: 1 K/UL (ref 1–5.1)
LYMPHOCYTES RELATIVE PERCENT: 11.1 %
MCH RBC QN AUTO: 31.7 PG (ref 26–34)
MCHC RBC AUTO-ENTMCNC: 34.5 G/DL (ref 31–36)
MCV RBC AUTO: 91.8 FL (ref 80–100)
MONOCYTES ABSOLUTE: 1.3 K/UL (ref 0–1.3)
MONOCYTES RELATIVE PERCENT: 14.5 %
NEUTROPHILS ABSOLUTE: 6.8 K/UL (ref 1.7–7.7)
NEUTROPHILS RELATIVE PERCENT: 73.5 %
PDW BLD-RTO: 15.5 % (ref 12.4–15.4)
PLATELET # BLD: 207 K/UL (ref 135–450)
PMV BLD AUTO: 9.6 FL (ref 5–10.5)
POTASSIUM REFLEX MAGNESIUM: 3.9 MMOL/L (ref 3.5–5.1)
RBC # BLD: 4.22 M/UL (ref 4.2–5.9)
SODIUM BLD-SCNC: 140 MMOL/L (ref 136–145)
WBC # BLD: 9.2 K/UL (ref 4–11)

## 2020-05-03 PROCEDURE — 2580000003 HC RX 258: Performed by: STUDENT IN AN ORGANIZED HEALTH CARE EDUCATION/TRAINING PROGRAM

## 2020-05-03 PROCEDURE — 36415 COLL VENOUS BLD VENIPUNCTURE: CPT

## 2020-05-03 PROCEDURE — 2500000003 HC RX 250 WO HCPCS: Performed by: NURSE PRACTITIONER

## 2020-05-03 PROCEDURE — 6360000002 HC RX W HCPCS: Performed by: STUDENT IN AN ORGANIZED HEALTH CARE EDUCATION/TRAINING PROGRAM

## 2020-05-03 PROCEDURE — 97162 PT EVAL MOD COMPLEX 30 MIN: CPT

## 2020-05-03 PROCEDURE — 85025 COMPLETE CBC W/AUTO DIFF WBC: CPT

## 2020-05-03 PROCEDURE — 6360000002 HC RX W HCPCS: Performed by: NURSE PRACTITIONER

## 2020-05-03 PROCEDURE — 80048 BASIC METABOLIC PNL TOTAL CA: CPT

## 2020-05-03 PROCEDURE — 97110 THERAPEUTIC EXERCISES: CPT

## 2020-05-03 PROCEDURE — 2060000000 HC ICU INTERMEDIATE R&B

## 2020-05-03 PROCEDURE — 2580000003 HC RX 258: Performed by: NURSE PRACTITIONER

## 2020-05-03 PROCEDURE — 97530 THERAPEUTIC ACTIVITIES: CPT

## 2020-05-03 PROCEDURE — 95813 EEG EXTND MNTR 61-119 MIN: CPT

## 2020-05-03 PROCEDURE — 71045 X-RAY EXAM CHEST 1 VIEW: CPT

## 2020-05-03 PROCEDURE — 6370000000 HC RX 637 (ALT 250 FOR IP): Performed by: STUDENT IN AN ORGANIZED HEALTH CARE EDUCATION/TRAINING PROGRAM

## 2020-05-03 PROCEDURE — 97167 OT EVAL HIGH COMPLEX 60 MIN: CPT

## 2020-05-03 RX ADMIN — ACETAMINOPHEN 650 MG: 325 TABLET ORAL at 12:56

## 2020-05-03 RX ADMIN — TAMSULOSIN HYDROCHLORIDE 0.4 MG: 0.4 CAPSULE ORAL at 20:53

## 2020-05-03 RX ADMIN — LORAZEPAM 0.5 MG: 2 INJECTION INTRAMUSCULAR; INTRAVENOUS at 06:05

## 2020-05-03 RX ADMIN — ASPIRIN 81 MG 81 MG: 81 TABLET ORAL at 10:15

## 2020-05-03 RX ADMIN — ENOXAPARIN SODIUM 40 MG: 40 INJECTION SUBCUTANEOUS at 10:15

## 2020-05-03 RX ADMIN — VALPROATE SODIUM 500 MG: 100 INJECTION, SOLUTION INTRAVENOUS at 21:03

## 2020-05-03 RX ADMIN — LORAZEPAM 0.5 MG: 2 INJECTION INTRAMUSCULAR; INTRAVENOUS at 12:57

## 2020-05-03 RX ADMIN — THIAMINE HYDROCHLORIDE: 100 INJECTION, SOLUTION INTRAMUSCULAR; INTRAVENOUS at 10:26

## 2020-05-03 RX ADMIN — VALPROATE SODIUM 500 MG: 100 INJECTION, SOLUTION INTRAVENOUS at 12:58

## 2020-05-03 RX ADMIN — VALPROATE SODIUM 500 MG: 100 INJECTION, SOLUTION INTRAVENOUS at 04:00

## 2020-05-03 RX ADMIN — FAMOTIDINE 20 MG: 20 TABLET ORAL at 10:15

## 2020-05-03 RX ADMIN — FAMOTIDINE 20 MG: 20 TABLET ORAL at 20:53

## 2020-05-03 RX ADMIN — LISINOPRIL 10 MG: 10 TABLET ORAL at 10:15

## 2020-05-03 RX ADMIN — ATORVASTATIN CALCIUM 10 MG: 10 TABLET, FILM COATED ORAL at 10:15

## 2020-05-03 RX ADMIN — Medication 10 ML: at 20:53

## 2020-05-03 ASSESSMENT — PAIN SCALES - PAIN ASSESSMENT IN ADVANCED DEMENTIA (PAINAD)
FACIALEXPRESSION: 0
BREATHING: 0
FACIALEXPRESSION: 0
NEGVOCALIZATION: 0
BODYLANGUAGE: 0
BREATHING: 0
TOTALSCORE: 0
FACIALEXPRESSION: 0
BREATHING: 1
CONSOLABILITY: 0
FACIALEXPRESSION: 0
NEGVOCALIZATION: 0
BODYLANGUAGE: 0
BODYLANGUAGE: 0
NEGVOCALIZATION: 0
BODYLANGUAGE: 0
NEGVOCALIZATION: 0
TOTALSCORE: 0
TOTALSCORE: 0
CONSOLABILITY: 0
FACIALEXPRESSION: 0
BREATHING: 0
BODYLANGUAGE: 0
FACIALEXPRESSION: 0
BODYLANGUAGE: 0
BODYLANGUAGE: 0
CONSOLABILITY: 0
TOTALSCORE: 0
TOTALSCORE: 0
TOTALSCORE: 2
NEGVOCALIZATION: 0
TOTALSCORE: 0
CONSOLABILITY: 0
BREATHING: 0
CONSOLABILITY: 0
NEGVOCALIZATION: 0
BODYLANGUAGE: 0
FACIALEXPRESSION: 0
TOTALSCORE: 0
FACIALEXPRESSION: 0
TOTALSCORE: 0
CONSOLABILITY: 0
BREATHING: 0
NEGVOCALIZATION: 0
BREATHING: 0
NEGVOCALIZATION: 0
CONSOLABILITY: 0
TOTALSCORE: 0
BREATHING: 0
FACIALEXPRESSION: 0
TOTALSCORE: 0
FACIALEXPRESSION: 1
CONSOLABILITY: 0
CONSOLABILITY: 0
BREATHING: 0
BODYLANGUAGE: 0
BREATHING: 0
NEGVOCALIZATION: 0
CONSOLABILITY: 0
FACIALEXPRESSION: 0
NEGVOCALIZATION: 0
NEGVOCALIZATION: 0
BREATHING: 0
BODYLANGUAGE: 0
CONSOLABILITY: 0
BODYLANGUAGE: 0

## 2020-05-03 ASSESSMENT — PAIN SCALES - GENERAL
PAINLEVEL_OUTOF10: 0
PAINLEVEL_OUTOF10: 0

## 2020-05-03 NOTE — PROGRESS NOTES
Occupational Therapy   Occupational Therapy Initial Assessment/Tx/Discharge Note  Date: 5/3/2020   Patient Name: Jose Chun  MRN: [id number]     : 1965    Date of Service: 5/3/2020     Assessment: On eval, pt did not become alert, open eyes, follow commands, or initiate any purposeful movements. Strength, ROM, and muscle tone impaired in all extremities. Pt is currently unable to participate in therapy. Recommend continued Q2 hr turns, frequent ROM and safe positioning of all extremities, and adrian for any transfer out of bed when appropriate. Will d/c acute OT. Will re-evaluate if mental status improves during admission. Pt will require total care at d/. Pt can be evaluated by PT/OT at next care facility if improvements are noted. Discharge Recommendations: Yonas Nelson scored a  on the AM-PAC ADL Inpatient form. Current research shows that an AM-PAC score of 17 or less is typically not associated with a discharge to the patient's home setting. Based on the patients AM-PAC score and their current ADL deficits, it is recommended that the patient have 3-5 sessions per week of Occupational Therapy at d/ to increase the patients independence. See assessment. OT Equipment Recommendations  Equipment Needed: No    Assessment   Performance deficits / Impairments: Decreased functional mobility ; Decreased safe awareness;Decreased balance;Decreased ADL status; Decreased endurance;Decreased ROM; Decreased strength;Decreased cognition;Decreased high-level IADLs  Decision Making: High Complexity  No Skilled OT: No OT goals identified  REQUIRES OT FOLLOW UP: No  Activity Tolerance  Activity Tolerance: Treatment limited secondary to decreased cognition  Safety Devices  Safety Devices in place: Yes  Type of devices: Bed alarm in place;Call light within reach; Left in bed;Nurse notified  Restraints  Initially in place: Yes  Restraints: wrist retraints fastened again at end of session LUE  LUE Tone: Hypertonic  Quality of Movement Other  Comment: RUE stiffness/hypertonicity at all joints with strong involuntary resistance to PROM. LUE with no AROM on initial assessment, later noted to demonstrate resistance vs. increased tone proximally. Attempted PROM x5-10 reps at all joints with difficulty due to tone and resistance. Bed mobility  Scooting: Dependent/Total;2 Person assistance(toward HOB)  Comment: Based on cognition/no functional use of extremities, anticipate pt dependent for all bed mobility, sitting, transfers. Recommend continued Q2 turns + ROM. Pt will require adrian for transfers to a safe/supportive chair. Cognition  Overall Cognitive Status: Exceptions  Cognition Comment: did not open eyes. did not follow commands. No purposeful movements. Unable to advocate for basic needs.          Plan  - D/C OT services         AM-PAC Score  AM-[facility] Inpatient Daily Activity Raw Score: 6 (05/03/20 1321)  AM-PAC Inpatient ADL T-Scale Score : 17.07 (05/03/20 1321)  ADL Inpatient CMS 0-100% Score: 100 (05/03/20 1321)  ADL Inpatient CMS G-Code Modifier : CN (05/03/20 1321)      Therapy Time   Individual Concurrent Group Co-treatment   Time In 1155         Time Out 1220         Minutes 25          Timed Code Tx Min: 10  Total Tx Time: 25       Sarah Davison, OT

## 2020-05-04 ENCOUNTER — APPOINTMENT (OUTPATIENT)
Dept: GENERAL RADIOLOGY | Age: 55
DRG: 052 | End: 2020-05-04
Attending: INTERNAL MEDICINE
Payer: COMMERCIAL

## 2020-05-04 LAB
ANION GAP SERPL CALCULATED.3IONS-SCNC: 15 MMOL/L (ref 3–16)
BACTERIA: ABNORMAL /HPF
BILIRUBIN URINE: NEGATIVE
BLOOD, URINE: NEGATIVE
BUN BLDV-MCNC: 17 MG/DL (ref 7–20)
CALCIUM SERPL-MCNC: 9.1 MG/DL (ref 8.3–10.6)
CHLORIDE BLD-SCNC: 102 MMOL/L (ref 99–110)
CLARITY: CLEAR
CO2: 23 MMOL/L (ref 21–32)
COLOR: YELLOW
CREAT SERPL-MCNC: 0.7 MG/DL (ref 0.9–1.3)
GFR AFRICAN AMERICAN: >60
GFR NON-AFRICAN AMERICAN: >60
GLUCOSE BLD-MCNC: 115 MG/DL (ref 70–99)
GLUCOSE URINE: NEGATIVE MG/DL
HCT VFR BLD CALC: 38.4 % (ref 40.5–52.5)
HEMOGLOBIN: 12.9 G/DL (ref 13.5–17.5)
KETONES, URINE: 15 MG/DL
LEUKOCYTE ESTERASE, URINE: NEGATIVE
MCH RBC QN AUTO: 31.3 PG (ref 26–34)
MCHC RBC AUTO-ENTMCNC: 33.6 G/DL (ref 31–36)
MCV RBC AUTO: 93 FL (ref 80–100)
MICROSCOPIC EXAMINATION: YES
NITRITE, URINE: POSITIVE
PDW BLD-RTO: 15.4 % (ref 12.4–15.4)
PH UA: 5.5 (ref 5–8)
PLATELET # BLD: 220 K/UL (ref 135–450)
PMV BLD AUTO: 9.6 FL (ref 5–10.5)
POTASSIUM REFLEX MAGNESIUM: 4 MMOL/L (ref 3.5–5.1)
PROTEIN UA: ABNORMAL MG/DL
RBC # BLD: 4.12 M/UL (ref 4.2–5.9)
RBC UA: ABNORMAL /HPF (ref 0–4)
SARS-COV-2, PCR: NOT DETECTED
SODIUM BLD-SCNC: 140 MMOL/L (ref 136–145)
SPECIFIC GRAVITY UA: >=1.03 (ref 1–1.03)
URINE REFLEX TO CULTURE: ABNORMAL
URINE TYPE: ABNORMAL
UROBILINOGEN, URINE: 1 E.U./DL
WBC # BLD: 11.2 K/UL (ref 4–11)
WBC UA: ABNORMAL /HPF (ref 0–5)

## 2020-05-04 PROCEDURE — 2580000003 HC RX 258: Performed by: NURSE PRACTITIONER

## 2020-05-04 PROCEDURE — 6360000002 HC RX W HCPCS: Performed by: STUDENT IN AN ORGANIZED HEALTH CARE EDUCATION/TRAINING PROGRAM

## 2020-05-04 PROCEDURE — 85027 COMPLETE CBC AUTOMATED: CPT

## 2020-05-04 PROCEDURE — 2060000000 HC ICU INTERMEDIATE R&B

## 2020-05-04 PROCEDURE — 2500000003 HC RX 250 WO HCPCS: Performed by: NURSE PRACTITIONER

## 2020-05-04 PROCEDURE — 80048 BASIC METABOLIC PNL TOTAL CA: CPT

## 2020-05-04 PROCEDURE — 86403 PARTICLE AGGLUT ANTBDY SCRN: CPT

## 2020-05-04 PROCEDURE — 87040 BLOOD CULTURE FOR BACTERIA: CPT

## 2020-05-04 PROCEDURE — 6370000000 HC RX 637 (ALT 250 FOR IP): Performed by: STUDENT IN AN ORGANIZED HEALTH CARE EDUCATION/TRAINING PROGRAM

## 2020-05-04 PROCEDURE — 2580000003 HC RX 258: Performed by: STUDENT IN AN ORGANIZED HEALTH CARE EDUCATION/TRAINING PROGRAM

## 2020-05-04 PROCEDURE — 87070 CULTURE OTHR SPECIMN AEROBIC: CPT

## 2020-05-04 PROCEDURE — 71045 X-RAY EXAM CHEST 1 VIEW: CPT

## 2020-05-04 PROCEDURE — 51701 INSERT BLADDER CATHETER: CPT

## 2020-05-04 PROCEDURE — 6360000002 HC RX W HCPCS: Performed by: NURSE PRACTITIONER

## 2020-05-04 PROCEDURE — 31720 CLEARANCE OF AIRWAYS: CPT

## 2020-05-04 PROCEDURE — 87077 CULTURE AEROBIC IDENTIFY: CPT

## 2020-05-04 PROCEDURE — 95813 EEG EXTND MNTR 61-119 MIN: CPT

## 2020-05-04 PROCEDURE — 87205 SMEAR GRAM STAIN: CPT

## 2020-05-04 PROCEDURE — C9113 INJ PANTOPRAZOLE SODIUM, VIA: HCPCS | Performed by: STUDENT IN AN ORGANIZED HEALTH CARE EDUCATION/TRAINING PROGRAM

## 2020-05-04 PROCEDURE — 87186 SC STD MICRODIL/AGAR DIL: CPT

## 2020-05-04 PROCEDURE — 81001 URINALYSIS AUTO W/SCOPE: CPT

## 2020-05-04 RX ORDER — PANTOPRAZOLE SODIUM 40 MG/10ML
40 INJECTION, POWDER, LYOPHILIZED, FOR SOLUTION INTRAVENOUS DAILY
Status: DISCONTINUED | OUTPATIENT
Start: 2020-05-04 | End: 2020-05-09

## 2020-05-04 RX ADMIN — VALPROATE SODIUM 500 MG: 100 INJECTION, SOLUTION INTRAVENOUS at 05:25

## 2020-05-04 RX ADMIN — LISINOPRIL 10 MG: 10 TABLET ORAL at 20:21

## 2020-05-04 RX ADMIN — LORAZEPAM 0.5 MG: 2 INJECTION INTRAMUSCULAR; INTRAVENOUS at 05:25

## 2020-05-04 RX ADMIN — ACETAMINOPHEN 650 MG: 650 SUPPOSITORY RECTAL at 11:34

## 2020-05-04 RX ADMIN — SODIUM CHLORIDE: 9 INJECTION, SOLUTION INTRAVENOUS at 15:54

## 2020-05-04 RX ADMIN — THIAMINE HYDROCHLORIDE: 100 INJECTION, SOLUTION INTRAMUSCULAR; INTRAVENOUS at 09:04

## 2020-05-04 RX ADMIN — ATORVASTATIN CALCIUM 10 MG: 10 TABLET, FILM COATED ORAL at 20:21

## 2020-05-04 RX ADMIN — Medication 10 ML: at 09:09

## 2020-05-04 RX ADMIN — VALPROATE SODIUM 500 MG: 100 INJECTION, SOLUTION INTRAVENOUS at 11:27

## 2020-05-04 RX ADMIN — PANTOPRAZOLE SODIUM 40 MG: 40 INJECTION, POWDER, FOR SOLUTION INTRAVENOUS at 15:54

## 2020-05-04 RX ADMIN — ENOXAPARIN SODIUM 40 MG: 40 INJECTION SUBCUTANEOUS at 09:08

## 2020-05-04 RX ADMIN — TAMSULOSIN HYDROCHLORIDE 0.4 MG: 0.4 CAPSULE ORAL at 20:21

## 2020-05-04 RX ADMIN — VALPROATE SODIUM 500 MG: 100 INJECTION, SOLUTION INTRAVENOUS at 20:20

## 2020-05-04 RX ADMIN — Medication 10 ML: at 20:21

## 2020-05-04 ASSESSMENT — PAIN SCALES - PAIN ASSESSMENT IN ADVANCED DEMENTIA (PAINAD)
CONSOLABILITY: 0
NEGVOCALIZATION: 0
NEGVOCALIZATION: 0
BODYLANGUAGE: 0
BODYLANGUAGE: 0
NEGVOCALIZATION: 0
CONSOLABILITY: 0
CONSOLABILITY: 0
FACIALEXPRESSION: 0
BODYLANGUAGE: 0
NEGVOCALIZATION: 0
CONSOLABILITY: 0
CONSOLABILITY: 0
TOTALSCORE: 0
FACIALEXPRESSION: 0
TOTALSCORE: 0
TOTALSCORE: 0
BODYLANGUAGE: 0
NEGVOCALIZATION: 0
TOTALSCORE: 0
NEGVOCALIZATION: 0
TOTALSCORE: 0
NEGVOCALIZATION: 0
CONSOLABILITY: 0
BREATHING: 0
NEGVOCALIZATION: 0
BREATHING: 0
BREATHING: 0
TOTALSCORE: 0
BODYLANGUAGE: 0
TOTALSCORE: 0
BREATHING: 0
TOTALSCORE: 0
TOTALSCORE: 0
NEGVOCALIZATION: 0
TOTALSCORE: 0
FACIALEXPRESSION: 0
BREATHING: 0
BREATHING: 0
CONSOLABILITY: 0
FACIALEXPRESSION: 0
BREATHING: 0
CONSOLABILITY: 0
BREATHING: 0
NEGVOCALIZATION: 0
CONSOLABILITY: 0
BODYLANGUAGE: 0
BREATHING: 0
FACIALEXPRESSION: 0
BODYLANGUAGE: 0
BREATHING: 0
CONSOLABILITY: 0
TOTALSCORE: 0
BREATHING: 0
FACIALEXPRESSION: 0
TOTALSCORE: 0
FACIALEXPRESSION: 0
NEGVOCALIZATION: 0
NEGVOCALIZATION: 0
TOTALSCORE: 0
BODYLANGUAGE: 0
FACIALEXPRESSION: 0
FACIALEXPRESSION: 0
BREATHING: 0
TOTALSCORE: 0
CONSOLABILITY: 0
NEGVOCALIZATION: 0
CONSOLABILITY: 0
FACIALEXPRESSION: 0
BREATHING: 0
CONSOLABILITY: 0
TOTALSCORE: 0
BODYLANGUAGE: 0
TOTALSCORE: 0
NEGVOCALIZATION: 0
NEGVOCALIZATION: 0
BREATHING: 0
CONSOLABILITY: 0
BREATHING: 0
BODYLANGUAGE: 0
BREATHING: 0
FACIALEXPRESSION: 0
BODYLANGUAGE: 0
BREATHING: 0
BODYLANGUAGE: 0
BREATHING: 0
BODYLANGUAGE: 0
FACIALEXPRESSION: 0
BODYLANGUAGE: 0
FACIALEXPRESSION: 0
CONSOLABILITY: 0
BREATHING: 0
BODYLANGUAGE: 0
BREATHING: 0
TOTALSCORE: 0
NEGVOCALIZATION: 0
BODYLANGUAGE: 0
TOTALSCORE: 0
BODYLANGUAGE: 0
NEGVOCALIZATION: 0
CONSOLABILITY: 0
NEGVOCALIZATION: 0
BODYLANGUAGE: 0
FACIALEXPRESSION: 0
TOTALSCORE: 0
CONSOLABILITY: 0
NEGVOCALIZATION: 0
BODYLANGUAGE: 0
FACIALEXPRESSION: 0
FACIALEXPRESSION: 0
NEGVOCALIZATION: 0
FACIALEXPRESSION: 0
CONSOLABILITY: 0
CONSOLABILITY: 0
BODYLANGUAGE: 0
TOTALSCORE: 0
CONSOLABILITY: 0
TOTALSCORE: 0
FACIALEXPRESSION: 0
NEGVOCALIZATION: 0
NEGVOCALIZATION: 0
FACIALEXPRESSION: 0
FACIALEXPRESSION: 0
BODYLANGUAGE: 0
NEGVOCALIZATION: 0
FACIALEXPRESSION: 0
CONSOLABILITY: 0
FACIALEXPRESSION: 0
FACIALEXPRESSION: 0
BREATHING: 0

## 2020-05-04 ASSESSMENT — PAIN SCALES - WONG BAKER
WONGBAKER_NUMERICALRESPONSE: 0

## 2020-05-04 ASSESSMENT — PAIN SCALES - GENERAL: PAINLEVEL_OUTOF10: 0

## 2020-05-04 NOTE — CARE COORDINATION
CM has verified referrals faxed to both [facility] and [facility] for SNF placement for discharge. CM has called and left voicemails with admissions coordinators requesting return call with decision for admission and pre-cert being started. Awaiting return call from admissions. Patient COVID NEGATIVE as of 05.04.02 @ 0804. Thank you,  Yuki Mckinley RN,   4th Floor Progressive Care Unit  [phone]      12:26 PM  CM received message from [facility] at [facility], they are not able to accept patient as they only take patients over 72 and they also do not accept patients insurance. CM awaiting return call from [facility] regarding ablility to accept for admission. CM will update POC once determination from [facility] made and will provide alternate SNF options in-network with patients insurance. Thank you,  Yuki Mckinley RN,   4th Floor Progressive Care Unit  [phone]      3:49 PM  CM updated patients girlfriend and POC, she is looking into alternate options for SNF, aware that [facility] is still clinically reviewing and has not returned decision for admission at this time. Girlfriend reports waiting to speak with MD and would like update regarding current condition and potential for rehab. She reports some family is interested in patient going to [facility], but girl friend would like to find a good rehab here for discharge. Girlfriend would also like to try and see the patient either in person or via facetime or skype if possible, CM reinforced no visitors but encouraged her to discuss with nursing about options for facetime or skype if available. Girlfriend verbalized understanding and reported she will discuss when she gets update from nursing later today.     Thank you,  Yuki Mckinley RN,   4th Floor Progressive Care Unit  [phone]

## 2020-05-04 NOTE — PROGRESS NOTES
NUTRITION ASSESSMENT  Admission Date: 4/28/2020     Type and Reason for Visit: Reassess    NUTRITION RECOMMENDATIONS:   · Recommend restarting TF via NGT: standard w/ fiber formula \"Jevity 1.2\" formula at 25 mL/hr and as tolerated, increase by 25  mL/hr q 4 hours until goal of 70 mL/hr  · Water bolus 110 mL H20 q 4 hours. · Monitor for tolerance (bowel habits, N/V, cramping, abdominal distention). Recommend adding scheduled bowel regimen (no BM documented since admission)    NUTRITION ASSESSMENT:  Pt remains at nutritional compromise meeting criteria for malnutrition and w/ AMS and inability to tolerate oral diet. NGT is in place and pt was tolerating TF at goal rate however noted TF stopped last PM. Spoke w/ RN this am and reports pt w/ increase in secretions therefore TF was stopped. To monitor tolerance to TF bowel habits, N/V, cramping and abdominal distention are evaluated. No BM documented since admission, recommend starting schedule bowel regimen and restarting TF to meet pt nutritional needs.       MALNUTRITION ASSESSMENT  Context: Acute illness or injury   Malnutrition Status: Meets the criteria for moderate malnutrition  Findings of the 6 clinical characteristics of malnutrition (Minimum of 2 out of 6 clinical characteristics is required to make the diagnosis of moderate or severe Protein Calorie Malnutrition based on AND/ASPEN Guidelines):  Energy Intake %: Less than or equal to 75% of estimated energy requirement  Energy Intake Time: Greater than or equal to 5 days  Interpretation of Weight Loss %: 2% loss or greater  Interpretation of Weight Loss Time: in 1 week  Body Fat Status: Unable to assess  Muscle Mass Status: Unable to assess  Fluid Accumulation Status: No significant fluid accumulation  Reduced  Strength: Not measured    NUTRITION DIAGNOSIS   Problem: Inadequate Oral Intake  Etiology: Cognitive or neurological impairment  Signs & Symptoms: NPO status due to medical condition    NUTRITION INTERVENTION  Food and/or Nutrient Delivery:Continue NPO or Start Tube Feeding   Nutrition education/counseling/coordination of care: Continue Inpatient Monitoring     NUTRITION MONITORING & EVALUATION:  Evaluation:Progress towards goal declining   Goals: Patient will tolerate EN to provide 100% of nutrition needs  Monitoring: Mental Status/Confusion , Nutrition Progression  or TF Tolerance      OBJECTIVE DATA:  · Nutrition-Focused Physical Findings: LBM 4/27 PTA, no bowel regimen noted; AMS; NGT clamped  · Wounds None      Past Medical History:   Diagnosis Date    ADHD     Arthritis     Bipolar 1 disorder ([facility] [address]     Depression     Hypertension     Sleep apnea         ANTHROPOMETRICS  Current Height: 6' (182.9 cm)  Current Weight: 215 lb 13.3 oz (97.9 kg)    Admission weight: 217 lb 6 oz (98.6 kg)  Ideal Bodyweight 178 lb (80 kg)  Weight Changes 3.5% loss ~1 week, 11% loss ~3m      BMI BMI (Calculated): 29.3    Wt Readings from Last 50 Encounters:   05/01/20 215 lb 13.3 oz (97.9 kg)   04/26/20 223 lb 8.7 oz (101.4 kg)   01/27/20 244 lb 14.9 oz (111.1 kg)   01/09/20 245 lb (111.1 kg)   01/05/20 245 lb 6 oz (111.3 kg)   01/02/20 246 lb 7.6 oz (111.8 kg)   01/01/20 246 lb 7.6 oz (111.8 kg)   12/01/19 225 lb (102.1 kg)   11/26/19 228 lb 13.4 oz (103.8 kg)   11/12/19 228 lb 13.4 oz (103.8 kg)   11/08/19 228 lb 13.4 oz (103.8 kg)   11/04/19 227 lb (103 kg)   11/04/19 227 lb (103 kg)   10/24/19 220 lb (99.8 kg)   10/15/19 227 lb (103 kg)   08/22/19 233 lb 14.5 oz (106.1 kg)   08/15/19 237 lb (107.5 kg)   06/16/19 234 lb 12.6 oz (106.5 kg)   03/08/19 244 lb 4.3 oz (110.8 kg)       COMPARATIVE STANDARDS  Estimated Total Kcals/Day : 18-22 Current Bodyweight (97 kg) 7126-9867 kcal    Estimated Total Protein (g/day) : 1.0-1.2 Current Bodyweight (97 kg)  g/day  Estimated Daily Total Fluid (ml/day): 4878-4972 mL per day     Food / Nutrition-Related History  Pre-Admission / Home Diet:  Pre-Admission/Home Diet: General

## 2020-05-04 NOTE — PROGRESS NOTES
8 hrs: BP Temp Temp src Pulse Resp SpO2   05/04/20 0400 119/71 100.2 °F (37.9 °C) Axillary -- 22 93 %   05/04/20 0000 133/78 100 °F (37.8 °C) Axillary 97 22 92 %       Intake/Output Summary (Last 24 hours) at 5/4/2020 0622  Last data filed at 5/4/2020 0036  Gross per 24 hour   Intake 2658.1 ml   Output 1310 ml   Net 1348.1 ml       Review of Systems   Unable to perform ROS: Mental status change     Physical Exam  Vitals signs and nursing note reviewed. Constitutional:       General: He is not in acute distress. Appearance: He is not diaphoretic. Comments: Lethargic   HENT:      Head: Normocephalic and atraumatic. Nose: Nose normal. No rhinorrhea. Mouth/Throat:      Comments: Hard palate and some soft palate coated with tube feeds  Eyes:      General: No scleral icterus. Conjunctiva/sclera: Conjunctivae normal.      Pupils: Pupils are equal, round, and reactive to light. Neck:      Musculoskeletal: Normal range of motion and neck supple. Cardiovascular:      Rate and Rhythm: Normal rate and regular rhythm. Heart sounds: No friction rub. No gallop. Pulmonary:      Effort: Pulmonary effort is normal. No respiratory distress. Breath sounds: No stridor. No rhonchi. Chest:      Chest wall: No tenderness. Abdominal:      General: Bowel sounds are normal. There is no distension. Palpations: Abdomen is soft. Tenderness: There is no abdominal tenderness. Musculoskeletal:         General: No tenderness. Right lower leg: No edema. Left lower leg: No edema. Skin:     General: Skin is warm and dry. Coloration: Skin is not jaundiced or pale. Neurological:      Comments: Sleeping comfortably. Does not verbalize at this time.    Psychiatric:      Comments: Unable to assess         LABS:    CBC:   Recent Labs     05/02/20  0420 05/03/20  0600   WBC 7.8 9.2   HGB 14.1 13.4*   HCT 40.6 38.7*    207   MCV 91.1 91.8     Renal:    Recent Labs

## 2020-05-04 NOTE — ADT AUTH CERT
Was ordered four times PRN but reduced to BID       Code Status: Full code   FEN: Dietitian consulted, pt on TFs   PPX: Lovenox   DISPO: GMF, Home care vs SNF on discharge              Neurology [address] Day 5 (5/2/2020) by Fercho Reddy RN         Review Status Review Entered   Completed 5/4/2020 16:24       Criteria Review      Care Day: 5 Care Date: 5/2/2020 Level of Care:    Guideline Day 3    Level Of Care    (X) * Activity level acceptable    (X) Floor to discharge    ( ) * Complete discharge planning    Clinical Status    (X) * No infection, or status acceptable    5/4/2020 4:24 PM EDT by Khloe Michael      t 37.7    (X) * Isolation not needed, or status acceptable    (X) * Respiratory status acceptable    5/4/2020 4:24 PM EDT by Khloe Michael      rr 18-19  spo2 94-96% ra    (X) * Pain and nausea absent or adequately managed    (X) * Ventilatory status acceptable    ( ) * Neurologic problems absent or stabilized    5/4/2020 4:24 PM EDT by Khloe Michael      acute encephalopathy    (X) * Muscle or nerve damage absent or stable    ( ) * General Discharge Criteria met    Interventions    ( ) * Intake acceptable    ( ) * No inpatient interventions needed    * Milestone   Additional Notes   5/2  Day 5      Pt remains on medsurg unit      Vitals: t: 37.7 p: 90 rr: 18 bp: 133/81 spo2: 98% ra      Abn labs:  gluc; 149, hgb: 13.4      Orders:   IVF @ 50ml/hr   Lovenox 40mgs q dily   Protonix 40mg iv daily   Depacon 500mg iv every 8 hrs   Cbc, pr, inr, bmp, cbc daily      Per IM PN:  Assessment/Plan:       Acute encephalopathy - likely 2/2 methamphetamine use   From the review of notes, medical workup has not revealed a cause for AMS. Positive UDS with amphetamines and benzos however that does not explain the mental status change since his admission to [address] [place] [address] has been over a week. MRI done of brain showed a left parietal lobe infarct which was acute in nature.  Neurology followed the patient at [address] [place] [address]

## 2020-05-04 NOTE — PLAN OF CARE
Problem: Nutrition  Goal: Optimal nutrition therapy  Outcome: Ongoing   Nutrition Problem: Inadequate Oral Intake   Intervention: Restart TF   Nutrition Goals: Patient will tolerate EN to provide 100% of nutrition needs

## 2020-05-05 ENCOUNTER — APPOINTMENT (OUTPATIENT)
Dept: GENERAL RADIOLOGY | Age: 55
DRG: 052 | End: 2020-05-05
Attending: INTERNAL MEDICINE
Payer: COMMERCIAL

## 2020-05-05 LAB
ANION GAP SERPL CALCULATED.3IONS-SCNC: 12 MMOL/L (ref 3–16)
BUN BLDV-MCNC: 21 MG/DL (ref 7–20)
CALCIUM SERPL-MCNC: 9.3 MG/DL (ref 8.3–10.6)
CHLORIDE BLD-SCNC: 105 MMOL/L (ref 99–110)
CO2: 24 MMOL/L (ref 21–32)
CREAT SERPL-MCNC: 0.8 MG/DL (ref 0.9–1.3)
GFR AFRICAN AMERICAN: >60
GFR NON-AFRICAN AMERICAN: >60
GLUCOSE BLD-MCNC: 102 MG/DL (ref 70–99)
GLUCOSE BLD-MCNC: 108 MG/DL (ref 70–99)
HCT VFR BLD CALC: 35.3 % (ref 40.5–52.5)
HEMOGLOBIN: 12.4 G/DL (ref 13.5–17.5)
MCH RBC QN AUTO: 32.1 PG (ref 26–34)
MCHC RBC AUTO-ENTMCNC: 35.2 G/DL (ref 31–36)
MCV RBC AUTO: 91.2 FL (ref 80–100)
PDW BLD-RTO: 15.3 % (ref 12.4–15.4)
PERFORMED ON: ABNORMAL
PLATELET # BLD: 214 K/UL (ref 135–450)
PMV BLD AUTO: 9.2 FL (ref 5–10.5)
POTASSIUM REFLEX MAGNESIUM: 4 MMOL/L (ref 3.5–5.1)
RBC # BLD: 3.87 M/UL (ref 4.2–5.9)
SODIUM BLD-SCNC: 141 MMOL/L (ref 136–145)
WBC # BLD: 8.8 K/UL (ref 4–11)

## 2020-05-05 PROCEDURE — C9113 INJ PANTOPRAZOLE SODIUM, VIA: HCPCS | Performed by: STUDENT IN AN ORGANIZED HEALTH CARE EDUCATION/TRAINING PROGRAM

## 2020-05-05 PROCEDURE — 2580000003 HC RX 258: Performed by: NURSE PRACTITIONER

## 2020-05-05 PROCEDURE — 2500000003 HC RX 250 WO HCPCS: Performed by: NURSE PRACTITIONER

## 2020-05-05 PROCEDURE — 6360000002 HC RX W HCPCS: Performed by: NURSE PRACTITIONER

## 2020-05-05 PROCEDURE — 2060000000 HC ICU INTERMEDIATE R&B

## 2020-05-05 PROCEDURE — 6360000002 HC RX W HCPCS: Performed by: STUDENT IN AN ORGANIZED HEALTH CARE EDUCATION/TRAINING PROGRAM

## 2020-05-05 PROCEDURE — 2580000003 HC RX 258: Performed by: STUDENT IN AN ORGANIZED HEALTH CARE EDUCATION/TRAINING PROGRAM

## 2020-05-05 PROCEDURE — 6360000002 HC RX W HCPCS

## 2020-05-05 PROCEDURE — 2580000003 HC RX 258

## 2020-05-05 PROCEDURE — 80048 BASIC METABOLIC PNL TOTAL CA: CPT

## 2020-05-05 PROCEDURE — 6370000000 HC RX 637 (ALT 250 FOR IP): Performed by: STUDENT IN AN ORGANIZED HEALTH CARE EDUCATION/TRAINING PROGRAM

## 2020-05-05 PROCEDURE — 71045 X-RAY EXAM CHEST 1 VIEW: CPT

## 2020-05-05 PROCEDURE — 85027 COMPLETE CBC AUTOMATED: CPT

## 2020-05-05 RX ORDER — ATORVASTATIN CALCIUM 40 MG/1
40 TABLET, FILM COATED ORAL DAILY
Status: DISCONTINUED | OUTPATIENT
Start: 2020-05-05 | End: 2020-05-09 | Stop reason: HOSPADM

## 2020-05-05 RX ORDER — THIAMINE MONONITRATE (VIT B1) 100 MG
100 TABLET ORAL DAILY
Status: DISCONTINUED | OUTPATIENT
Start: 2020-05-05 | End: 2020-05-05

## 2020-05-05 RX ORDER — FOLIC ACID 1 MG/1
1 TABLET ORAL DAILY
Status: DISCONTINUED | OUTPATIENT
Start: 2020-05-06 | End: 2020-05-05

## 2020-05-05 RX ORDER — PEDIATRIC MULTIPLE VITAMIN LIQ
5 LIQUID ORAL DAILY
Status: DISCONTINUED | OUTPATIENT
Start: 2020-05-05 | End: 2020-05-05

## 2020-05-05 RX ORDER — THIAMINE MONONITRATE (VIT B1) 100 MG
100 TABLET ORAL DAILY
Status: DISCONTINUED | OUTPATIENT
Start: 2020-05-06 | End: 2020-05-05

## 2020-05-05 RX ORDER — THIAMINE MONONITRATE (VIT B1) 100 MG
100 TABLET ORAL DAILY
Status: DISCONTINUED | OUTPATIENT
Start: 2020-05-06 | End: 2020-05-09 | Stop reason: HOSPADM

## 2020-05-05 RX ORDER — MULTIVIT-MIN/FERROUS GLUCONATE 9 MG/15 ML
15 LIQUID (ML) ORAL DAILY
Status: DISCONTINUED | OUTPATIENT
Start: 2020-05-06 | End: 2020-05-05

## 2020-05-05 RX ORDER — PEDIATRIC MULTIPLE VITAMIN LIQ
5 LIQUID ORAL DAILY
Status: DISCONTINUED | OUTPATIENT
Start: 2020-05-06 | End: 2020-05-05

## 2020-05-05 RX ORDER — FOLIC ACID 1 MG/1
1 TABLET ORAL DAILY
Status: DISCONTINUED | OUTPATIENT
Start: 2020-05-06 | End: 2020-05-09 | Stop reason: HOSPADM

## 2020-05-05 RX ORDER — MULTIVIT-MIN/FERROUS GLUCONATE 9 MG/15 ML
15 LIQUID (ML) ORAL DAILY
Status: DISCONTINUED | OUTPATIENT
Start: 2020-05-06 | End: 2020-05-09 | Stop reason: HOSPADM

## 2020-05-05 RX ORDER — ASPIRIN 81 MG/1
81 TABLET, CHEWABLE ORAL DAILY
Status: DISCONTINUED | OUTPATIENT
Start: 2020-05-06 | End: 2020-05-09 | Stop reason: HOSPADM

## 2020-05-05 RX ADMIN — ENOXAPARIN SODIUM 40 MG: 40 INJECTION SUBCUTANEOUS at 08:04

## 2020-05-05 RX ADMIN — VANCOMYCIN HYDROCHLORIDE 1250 MG: 10 INJECTION, POWDER, LYOPHILIZED, FOR SOLUTION INTRAVENOUS at 21:33

## 2020-05-05 RX ADMIN — VALPROATE SODIUM 500 MG: 100 INJECTION, SOLUTION INTRAVENOUS at 11:27

## 2020-05-05 RX ADMIN — Medication 10 ML: at 08:01

## 2020-05-05 RX ADMIN — CEFTRIAXONE 1 G: 1 INJECTION, POWDER, FOR SOLUTION INTRAMUSCULAR; INTRAVENOUS at 09:29

## 2020-05-05 RX ADMIN — VANCOMYCIN HYDROCHLORIDE 2500 MG: 10 INJECTION, POWDER, LYOPHILIZED, FOR SOLUTION INTRAVENOUS at 12:50

## 2020-05-05 RX ADMIN — ACETAMINOPHEN 650 MG: 650 SUPPOSITORY RECTAL at 19:51

## 2020-05-05 RX ADMIN — SODIUM CHLORIDE: 9 INJECTION, SOLUTION INTRAVENOUS at 20:22

## 2020-05-05 RX ADMIN — VALPROATE SODIUM 500 MG: 100 INJECTION, SOLUTION INTRAVENOUS at 20:21

## 2020-05-05 RX ADMIN — VALPROATE SODIUM 500 MG: 100 INJECTION, SOLUTION INTRAVENOUS at 03:49

## 2020-05-05 RX ADMIN — PANTOPRAZOLE SODIUM 40 MG: 40 INJECTION, POWDER, FOR SOLUTION INTRAVENOUS at 06:26

## 2020-05-05 RX ADMIN — Medication 10 ML: at 20:21

## 2020-05-05 RX ADMIN — ACETAMINOPHEN 650 MG: 325 TABLET ORAL at 03:49

## 2020-05-05 RX ADMIN — THIAMINE HYDROCHLORIDE: 100 INJECTION, SOLUTION INTRAMUSCULAR; INTRAVENOUS at 08:00

## 2020-05-05 ASSESSMENT — PAIN SCALES - PAIN ASSESSMENT IN ADVANCED DEMENTIA (PAINAD)
CONSOLABILITY: 0
FACIALEXPRESSION: 0
CONSOLABILITY: 0
FACIALEXPRESSION: 0
BREATHING: 0
CONSOLABILITY: 0
TOTALSCORE: 0
TOTALSCORE: 0
BODYLANGUAGE: 0
BREATHING: 0
CONSOLABILITY: 0
BODYLANGUAGE: 0
TOTALSCORE: 0
NEGVOCALIZATION: 0
NEGVOCALIZATION: 0
CONSOLABILITY: 0
NEGVOCALIZATION: 0
FACIALEXPRESSION: 0
TOTALSCORE: 0
BREATHING: 0
BREATHING: 0
NEGVOCALIZATION: 0
BREATHING: 0
CONSOLABILITY: 0
TOTALSCORE: 0
NEGVOCALIZATION: 0
CONSOLABILITY: 0
BREATHING: 0
BREATHING: 0
FACIALEXPRESSION: 0
BREATHING: 0
BODYLANGUAGE: 0
FACIALEXPRESSION: 0
TOTALSCORE: 0
FACIALEXPRESSION: 0
CONSOLABILITY: 0
BREATHING: 0
CONSOLABILITY: 0
TOTALSCORE: 0
BODYLANGUAGE: 0
FACIALEXPRESSION: 0
BODYLANGUAGE: 0
TOTALSCORE: 0
BREATHING: 0
TOTALSCORE: 0
CONSOLABILITY: 0
NEGVOCALIZATION: 0
BREATHING: 0
CONSOLABILITY: 0
BODYLANGUAGE: 0
TOTALSCORE: 0
BREATHING: 0
TOTALSCORE: 0
BODYLANGUAGE: 0
BODYLANGUAGE: 0
FACIALEXPRESSION: 0
FACIALEXPRESSION: 0
BODYLANGUAGE: 0
NEGVOCALIZATION: 0
BREATHING: 0
FACIALEXPRESSION: 0
CONSOLABILITY: 0
NEGVOCALIZATION: 0
FACIALEXPRESSION: 0
NEGVOCALIZATION: 0
CONSOLABILITY: 0
TOTALSCORE: 0
FACIALEXPRESSION: 0
TOTALSCORE: 0
FACIALEXPRESSION: 0
FACIALEXPRESSION: 0
BODYLANGUAGE: 0
NEGVOCALIZATION: 0
BODYLANGUAGE: 0
BODYLANGUAGE: 0
FACIALEXPRESSION: 0
TOTALSCORE: 0
NEGVOCALIZATION: 0
FACIALEXPRESSION: 0
NEGVOCALIZATION: 0
CONSOLABILITY: 0
CONSOLABILITY: 0
TOTALSCORE: 0
BODYLANGUAGE: 0
FACIALEXPRESSION: 0
BODYLANGUAGE: 0
NEGVOCALIZATION: 0
BODYLANGUAGE: 0
CONSOLABILITY: 0
BREATHING: 0
NEGVOCALIZATION: 0
CONSOLABILITY: 0
TOTALSCORE: 0
BODYLANGUAGE: 0
CONSOLABILITY: 0
BODYLANGUAGE: 0
TOTALSCORE: 0
BREATHING: 0
BREATHING: 0
NEGVOCALIZATION: 0
FACIALEXPRESSION: 0
BODYLANGUAGE: 0
NEGVOCALIZATION: 0
TOTALSCORE: 0
BREATHING: 0
BREATHING: 0

## 2020-05-05 ASSESSMENT — PAIN SCALES - WONG BAKER
WONGBAKER_NUMERICALRESPONSE: 0

## 2020-05-05 ASSESSMENT — PAIN SCALES - GENERAL
PAINLEVEL_OUTOF10: 0

## 2020-05-05 NOTE — PLAN OF CARE
cerebral perfusion will increase  Description: Signs of adequate cerebral perfusion will increase  5/4/2020 1636 by Gertrude Holter, RN  Outcome: Ongoing

## 2020-05-05 NOTE — PROGRESS NOTES
Spoke to pt's SETH/dana Fulton about pt's care. Discussed PEG tube placement for pt as he requires ongoing support with feeding. She provided verbal consent to proceed with PEG tube placement at this time. GI consulted.

## 2020-05-05 NOTE — PLAN OF CARE
prevention, q2hr turns, bilat.  Prevalon boots arms elevate don pillows, ROM q2hrs and prn; healing laceration left back of head from fall at home; frequent oral care /lips, eyes  On speciality rotating matress as well as staff turning     Problem: Discharge Planning:  Goal: Patients continuum of care needs are met  Description: Patients continuum of care needs are met  5/5/2020 1240 by Marysol Metz RN  Outcome: Met This Shift  Note: SNF when stable      Problem: Pain:  Goal: Pain level will decrease  Description: Pain level will decrease  5/5/2020 1240 by Marysol Metz RN  Outcome: Met This Shift  Note: Appears comfortable     Problem: Pain:  Goal: Recognizes and communicates pain  Description: Recognizes and communicates pain  Outcome: Met This Shift     Problem: Respiratory:  Goal: Ability to maintain a clear airway will improve  Description: Ability to maintain a clear airway will improve  5/5/2020 1240 by Marysol Metz RN  Outcome: Met This Shift  Note: Jesus Quinonez; less secretions today yanker suctioned mod amt white thick sputum ;c-xray completed +MRSA in sputum, dosing VANCO     Problem: Respiratory:  Goal: Ability to maintain adequate ventilation will improve  Description: Ability to maintain adequate ventilation will improve  Outcome: Met This Shift  Note: Sayts on R/A WNL's     Problem: Respiratory:  Goal: Complications related to the disease process, condition or treatment will be avoided or minimized  Description: Complications related to the disease process, condition or treatment will be avoided or minimized  Outcome: Met This Shift     Problem: Discharge Planning:  Goal: Discharged to appropriate level of care  Description: Discharged to appropriate level of care  5/5/2020 1240 by Marysol Metz RN  Outcome: Met This Shift     Problem: Coping:  Goal: Ability to cope will improve  Description: Ability to cope will improve  Outcome: Ongoing     Problem: Coping:  Goal: Ability to identify appropriate support needs will improve  Description: Ability to identify appropriate support needs will improve  Outcome: Ongoing     Problem: Self-Concept:  Goal: Level of anxiety will decrease  Description: Level of anxiety will decrease  Outcome: Ongoing     Problem: Verbal Communication - Impaired:  Goal: Functional communication will improve  Description: Functional communication will improve  5/5/2020 1240 by Marysol Metz RN  Outcome: Not Met This Shift     Problem: Verbal Communication - Impaired:  Goal: Ability to interact with others will improve  Description: Ability to interact with others will improve  Outcome: Not Met This Shift     Problem: Verbal Communication - Impaired:  Goal: Ability to establish a method of communication will improve  Description: Ability to establish a method of communication will improve  Outcome: Not Met This Shift     Problem: Mobility - Impaired:  Goal: Able to ambulate independently  Description: Able to ambulate independently  5/5/2020 1240 by Marysol Metz RN  Outcome: Not Met This Shift  Note: Dependent care ;pt is immobile     Problem: Mobility - Impaired:  Goal: Able to ambulate with minimal assistance  Description: Able to ambulate with minimal assistance  Outcome: Not Met This Shift     Problem: Mobility - Impaired:  Goal: Ability to appropriately use an adaptive device for ambulation will improve  Description: Ability to appropriately use an adaptive device for ambulation will improve  Outcome: Not Met This Shift     Problem: Mobility - Impaired:  Goal: Able to verbalize acceptance of life and situations over which he or she has no control  Description: Absence of contracture deformity  Outcome: Not Met This Shift     Problem: Nutrition  Goal: Optimal nutrition therapy  5/5/2020 1240 by Marysol Metz RN  Outcome: Not Met This Shift  Note: NPO and no TF ;possible aspiration     Problem: Physical Regulation:  Goal: Signs of adequate cerebral perfusion will increase  Description: Signs of adequate cerebral perfusion will increase  5/5/2020 1240 by Lyle Daniel RN  Outcome: Not Met This Shift     Problem: Physical Regulation:  Goal: Ability to maintain a stable neurologic state will improve  Description: Ability to maintain a stable neurologic state will improve  Outcome: Not Met This Shift     Problem: Self-Concept:  Goal: Ability to verbalize feelings about condition will improve  Description: Ability to verbalize feelings about condition will improve  Outcome: Not Met This Shift  Note: Pt non-verbal past few days d/c'd ativan

## 2020-05-05 NOTE — PROGRESS NOTES
Progress Note    Admit Date: 4/28/2020  Day: 7  Diet: No diet orders on file    CC: AMS    Interval history: No acute events overnight. Pt seen and examined at bedside this morning. This morning doing the same as yesterday, lethargic, not responding appropriately. Pupils are reactive and he does withdraw to pain. Vitals: Tmax 100.8 this AM, BP 110s-130s/70s, HR 90s, 95% RA  Labs: WBCs 8.8 (<11.2<9.2<7.8), Hgb 12.4 (<12.9<13.4<14.1), Na 141, K 4, Cr 0.8 (0.7<0.8<0.9)  CXR (5/3, 5/4): No abnormalities  COVID-19: Negative x2  Blood Cx (4/29): NGTD  Blood Cx (5/4): Pending    HPI: 47 yo M with PMH bipolar disorder, depression and ADHD presented to [facility] with AMS and multiple falls. UDS with amphetamines, benzos and marijuana. LP yielded minimal fluid for analysis and MRI showed acute left parietal infarct, however neuro did not think this was the cause of his encephalopathy. He was transferred to [facility] for cvEEG given the concern for status epilepticus.       Medications:     Scheduled Meds:   cefTRIAXone (ROCEPHIN) IV  1 g Intravenous Q24H    pantoprazole  40 mg Intravenous Daily    aspirin  81 mg Oral Daily    enoxaparin  40 mg Subcutaneous Daily    atorvastatin  10 mg Per NG tube Daily    lisinopril  10 mg Per NG tube Daily    valproate sodium (DEPACON) IVPB  500 mg Intravenous Q8H    tamsulosin  0.4 mg Oral Nightly    sodium chloride flush  10 mL Intravenous 2 times per day    folic acid, thiamine, multi-vitamin with vitamin K infusion   Intravenous Daily    [Held by provider] LORazepam  0.5 mg Intravenous 3 times per day     Continuous Infusions:   sodium chloride 50 mL/hr at 05/04/20 1554     PRN Meds:QUEtiapine, polyethylene glycol, promethazine **OR** ondansetron, perflutren lipid microspheres, HYDROmorphone, HYDROmorphone, morphine, HYDROmorphone, labetalol, hydrALAZINE, meperidine, sodium chloride flush, acetaminophen **OR** acetaminophen    Objective:   Vitals:   T-max:  Patient Vitals for 93.0 91.2     Renal:    Recent Labs     05/03/20  0600 05/04/20  0554 05/05/20  0352    140 141   K 3.9 4.0 4.0    102 105   CO2 22 23 24   BUN 19 17 21*   CREATININE 0.8* 0.7* 0.8*   GLUCOSE 125* 115* 102*   CALCIUM 8.7 9.1 9.3   ANIONGAP 13 15 12     Hepatic:   No results for input(s): AST, ALT, BILITOT, BILIDIR, PROT, LABALBU, ALKPHOS in the last 72 hours. Troponin: No results for input(s): TROPONINI in the last 72 hours. BNP: No results for input(s): BNP in the last 72 hours. Lipids: No results for input(s): CHOL, HDL in the last 72 hours. Invalid input(s): LDLCALCU, TRIGLYCERIDE  ABGs:    No results for input(s): PHART, XIB3ZLL, PO2ART, IKK8EBX, BEART, THGBART, X2LLCSBQ, UZJ2UUK in the last 72 hours. INR: No results for input(s): INR in the last 72 hours. Lactate: No results for input(s): LACTATE in the last 72 hours. Cultures:  -----------------------------------------------------------------  RAD:   XR CHEST PORTABLE   Final Result      Mild linear atelectasis or scarring in the right lung base associated with elevation of the right hemidiaphragm. Lungs are otherwise clear. Focal prominence of the descending thoracic aorta, aneurysm versus tortuosity, is again seen without change. Normal heart size. XR CHEST PORTABLE   Final Result   1. No findings for acute cardiopulmonary disease. XR CHEST PORTABLE   Final Result      1. No acute process or consolidation   2. NG tube remains in place               XR CHEST PORTABLE   Final Result   Impression: Enteric tube terminates within the mid gastric body. Possible trace left effusion. XR CHEST PORTABLE   Final Result      No acute pulmonary disease. IR LUMBAR PUNCTURE FOR DIAGNOSIS   Final Result      MRI BRAIN W WO CONTRAST   Final Result      1. Severely limited due to motion artifact. 2. No acute stroke, mass, or hemorrhage.    3. Severe confluent hyperintense FLAIR signal within the cerebral white accept TFs)  -PT (6/24) / OT (6/24)  -CW following: looking into [facility]    Code Status: Full code  FEN: Dietitian consulted, TFs paused. Will discuss feeding long term with POA. PPX: Lovenox  DISPO: GMF, Home care vs SNF on discharge    Alfonso Villareal MD, PGY-1  05/05/20  9:49 AM    This patient will be staffed and discussed with Dr. Nikki العلي. Patient seen and examined, labs and imaging studies reviewed, agree with assessment and plan as outlined above. Continue with current care and plan discussed case with nurse, will need peg tube, discussed w nursing, has mrsa isolate in sputum though x ray is negative. Will empirically give anti mrsa agent for mrsa pneumonia which may be early, tube feeds were stopped because of secretions.       MD Kathy Porter

## 2020-05-05 NOTE — CONSULTS
Consult received; patient febrile last night. Needs to be afebrile for 48 hours. Continue vanco. Possible PEG on Thursday. Will need to discuss risks with family including no decrease in PNA risk.

## 2020-05-06 PROBLEM — A49.02 MRSA INFECTION: Status: ACTIVE | Noted: 2020-05-06

## 2020-05-06 LAB
ANION GAP SERPL CALCULATED.3IONS-SCNC: 13 MMOL/L (ref 3–16)
BUN BLDV-MCNC: 19 MG/DL (ref 7–20)
CALCIUM SERPL-MCNC: 8.9 MG/DL (ref 8.3–10.6)
CHLORIDE BLD-SCNC: 106 MMOL/L (ref 99–110)
CO2: 24 MMOL/L (ref 21–32)
CREAT SERPL-MCNC: 0.7 MG/DL (ref 0.9–1.3)
CSF CULTURE: NORMAL
GFR AFRICAN AMERICAN: >60
GFR NON-AFRICAN AMERICAN: >60
GLUCOSE BLD-MCNC: 91 MG/DL (ref 70–99)
GRAM STAIN RESULT: NORMAL
HCT VFR BLD CALC: 33.8 % (ref 40.5–52.5)
HEMOGLOBIN: 11.5 G/DL (ref 13.5–17.5)
INR BLD: 1.15 (ref 0.86–1.14)
MCH RBC QN AUTO: 31.3 PG (ref 26–34)
MCHC RBC AUTO-ENTMCNC: 33.9 G/DL (ref 31–36)
MCV RBC AUTO: 92.4 FL (ref 80–100)
PDW BLD-RTO: 15 % (ref 12.4–15.4)
PLATELET # BLD: 263 K/UL (ref 135–450)
PMV BLD AUTO: 8.6 FL (ref 5–10.5)
POTASSIUM REFLEX MAGNESIUM: 3.7 MMOL/L (ref 3.5–5.1)
PROCALCITONIN: 0.1 NG/ML (ref 0–0.15)
PROTHROMBIN TIME: 13.4 SEC (ref 10–13.2)
RBC # BLD: 3.65 M/UL (ref 4.2–5.9)
SODIUM BLD-SCNC: 143 MMOL/L (ref 136–145)
VANCOMYCIN TROUGH: 16 UG/ML (ref 10–20)
WBC # BLD: 7.6 K/UL (ref 4–11)

## 2020-05-06 PROCEDURE — 6370000000 HC RX 637 (ALT 250 FOR IP): Performed by: STUDENT IN AN ORGANIZED HEALTH CARE EDUCATION/TRAINING PROGRAM

## 2020-05-06 PROCEDURE — 84145 PROCALCITONIN (PCT): CPT

## 2020-05-06 PROCEDURE — 2580000003 HC RX 258

## 2020-05-06 PROCEDURE — 2580000003 HC RX 258: Performed by: NURSE PRACTITIONER

## 2020-05-06 PROCEDURE — 80202 ASSAY OF VANCOMYCIN: CPT

## 2020-05-06 PROCEDURE — 2500000003 HC RX 250 WO HCPCS: Performed by: NURSE PRACTITIONER

## 2020-05-06 PROCEDURE — C9113 INJ PANTOPRAZOLE SODIUM, VIA: HCPCS | Performed by: STUDENT IN AN ORGANIZED HEALTH CARE EDUCATION/TRAINING PROGRAM

## 2020-05-06 PROCEDURE — 99255 IP/OBS CONSLTJ NEW/EST HI 80: CPT | Performed by: INTERNAL MEDICINE

## 2020-05-06 PROCEDURE — 85610 PROTHROMBIN TIME: CPT

## 2020-05-06 PROCEDURE — 2580000003 HC RX 258: Performed by: STUDENT IN AN ORGANIZED HEALTH CARE EDUCATION/TRAINING PROGRAM

## 2020-05-06 PROCEDURE — 36592 COLLECT BLOOD FROM PICC: CPT

## 2020-05-06 PROCEDURE — 2060000000 HC ICU INTERMEDIATE R&B

## 2020-05-06 PROCEDURE — 80048 BASIC METABOLIC PNL TOTAL CA: CPT

## 2020-05-06 PROCEDURE — 85027 COMPLETE CBC AUTOMATED: CPT

## 2020-05-06 PROCEDURE — 6360000002 HC RX W HCPCS

## 2020-05-06 PROCEDURE — 6360000002 HC RX W HCPCS: Performed by: STUDENT IN AN ORGANIZED HEALTH CARE EDUCATION/TRAINING PROGRAM

## 2020-05-06 RX ADMIN — VALPROATE SODIUM 500 MG: 100 INJECTION, SOLUTION INTRAVENOUS at 14:30

## 2020-05-06 RX ADMIN — ASPIRIN 81 MG 81 MG: 81 TABLET ORAL at 08:58

## 2020-05-06 RX ADMIN — FOLIC ACID 1 MG: 1 TABLET ORAL at 08:58

## 2020-05-06 RX ADMIN — LISINOPRIL 10 MG: 10 TABLET ORAL at 08:58

## 2020-05-06 RX ADMIN — VALPROATE SODIUM 500 MG: 100 INJECTION, SOLUTION INTRAVENOUS at 04:17

## 2020-05-06 RX ADMIN — PANTOPRAZOLE SODIUM 40 MG: 40 INJECTION, POWDER, FOR SOLUTION INTRAVENOUS at 06:38

## 2020-05-06 RX ADMIN — VANCOMYCIN HYDROCHLORIDE 1250 MG: 10 INJECTION, POWDER, LYOPHILIZED, FOR SOLUTION INTRAVENOUS at 12:48

## 2020-05-06 RX ADMIN — VANCOMYCIN HYDROCHLORIDE 1250 MG: 10 INJECTION, POWDER, LYOPHILIZED, FOR SOLUTION INTRAVENOUS at 21:27

## 2020-05-06 RX ADMIN — Medication 100 MG: at 08:58

## 2020-05-06 RX ADMIN — ATORVASTATIN CALCIUM 40 MG: 40 TABLET, FILM COATED ORAL at 08:58

## 2020-05-06 RX ADMIN — VANCOMYCIN HYDROCHLORIDE 1250 MG: 10 INJECTION, POWDER, LYOPHILIZED, FOR SOLUTION INTRAVENOUS at 05:22

## 2020-05-06 RX ADMIN — VALPROATE SODIUM 500 MG: 100 INJECTION, SOLUTION INTRAVENOUS at 19:55

## 2020-05-06 RX ADMIN — ENOXAPARIN SODIUM 40 MG: 40 INJECTION SUBCUTANEOUS at 08:57

## 2020-05-06 RX ADMIN — CEFTRIAXONE 1 G: 1 INJECTION, POWDER, FOR SOLUTION INTRAMUSCULAR; INTRAVENOUS at 08:57

## 2020-05-06 ASSESSMENT — PAIN SCALES - PAIN ASSESSMENT IN ADVANCED DEMENTIA (PAINAD)
BODYLANGUAGE: 0
BODYLANGUAGE: 0
FACIALEXPRESSION: 0
TOTALSCORE: 0
BODYLANGUAGE: 0
BREATHING: 0
NEGVOCALIZATION: 0
NEGVOCALIZATION: 0
TOTALSCORE: 0
FACIALEXPRESSION: 0
TOTALSCORE: 0
NEGVOCALIZATION: 0
CONSOLABILITY: 0
FACIALEXPRESSION: 0
BODYLANGUAGE: 0
BREATHING: 0
FACIALEXPRESSION: 0
BREATHING: 0
FACIALEXPRESSION: 0
TOTALSCORE: 0
BODYLANGUAGE: 0
TOTALSCORE: 0
FACIALEXPRESSION: 0
TOTALSCORE: 0
BREATHING: 0
CONSOLABILITY: 0
NEGVOCALIZATION: 0
CONSOLABILITY: 0
BODYLANGUAGE: 0
BREATHING: 0
CONSOLABILITY: 0
CONSOLABILITY: 0
BREATHING: 0
CONSOLABILITY: 0

## 2020-05-06 ASSESSMENT — PAIN SCALES - GENERAL
PAINLEVEL_OUTOF10: 0

## 2020-05-06 NOTE — PLAN OF CARE
Terri Ramsay, RN  Outcome: Ongoing  Note: Patient NPO, no signs of aspiration. Lungs clear and diminished with exception of rhonchi in upper right lobe. O2 95-98% on room air.

## 2020-05-06 NOTE — CONSULTS
Infectious Disease Consult note    RESIDENT NOTE - reviewed / edited, attending note at bottom    CC - fever     History Obtained From:  patient    HISTORY OF PRESENT ILLNESS:  48 yom hx of meth abuse, bipolar, recent CVA (acute left parietal) transferred to [facility] on 4/28 for neuro critical care. His baseline mentation prior to CVA was normal. Since his transfer, his neuro function has been severely impaired. More specifically he is very difficult to rouse and has garbled speech when he does wake up. He does not seem to understand instructions or follow commands. Hospital course: He has been receiving tube feeds through NG tube. He began spiking low grade fevers 2 days ago for which ID has been consulted to further elucidate source and whether abx therapy is necessary (was started on vancomycin and Rocephin on 5/5). UA had positive nitrites but negative LE, was not sent for culture. Apparently, in order to receive PEG tube patient needs to be afebrile for 48 hours apparently. Patient is on cvEEG to r/o non-convulsive seizure activity as a cause of his encephalopathy.      Micro Hx:   - sputum culture positive for MRSA, sens pending (5/4)  - urine cx not sent  - COVID negative  - HIV negative    Past Medical History:        Diagnosis Date    ADHD     Arthritis     Bipolar 1 disorder ([facility] [address]     Depression     Hypertension     Sleep apnea    ·     Past Surgical History:        Procedure Laterality Date    FRACTURE SURGERY Right     FX Tibia / Fibula    LEG SURGERY Right     Remove Plate    SHOULDER ARTHROSCOPY Right 11/8/2019    RIGHT SHOULDER ARTHROSCOPY,  ROTATOR CUFF REPAIR, SUBACROMIC DECOMPRESSION, LABRIAL DEBRIDEMENT performed by Silvina Noe MD at [employer] [facility]      x 4   ·     Medications Priorto Admission:    · Medications Prior to Admission: famotidine (PEPCID) 20 MG tablet, Take 1 tablet by mouth 2 times daily  · tamsulosin (FLOMAX) 0.4 MG capsule, Take 1 capsule by mouth Musculoskeletal: Normal range of motion. Skin:     General: Skin is warm and dry. Capillary Refill: Capillary refill takes less than 2 seconds. Neurological:      Mental Status: He is alert. Comments: Profoundly encephalopathic, very difficult to rouse          DATA:  Labs:  CBC:   Recent Labs     20  0554 20  0352 20  0429   WBC 11.2* 8.8 7.6   HGB 12.9* 12.4* 11.5*   HCT 38.4* 35.3* 33.8*    214 263       BMP:   Recent Labs     20  0554 20  0352 20  0429    141 143   K 4.0 4.0 3.7    105 106   CO2 23 24 24   BUN 17 21* 19   CREATININE 0.7* 0.8* 0.7*   GLUCOSE 115* 102* 91     LFT's: No results for input(s): AST, ALT, ALB, BILITOT, ALKPHOS in the last 72 hours. Troponin: No results for input(s): TROPONINI in the last 72 hours. BNP:No results for input(s): BNP in the last 72 hours. ABGs: No results for input(s): PHART, YKM7EYC, PO2ART in the last 72 hours. INR: No results for input(s): INR in the last 72 hours. U/A:  Recent Labs     20  1305   COLORU Yellow   PHUR 5.5   WBCUA 0-2   RBCUA 0-2   BACTERIA 3+*   CLARITYU Clear   SPECGRAV >=1.030   LEUKOCYTESUR Negative   UROBILINOGEN 1.0   BILIRUBINUR Negative   BLOODU Negative   GLUCOSEU Negative     IMAGIN20 CXR      XR CHEST PORTABLE   Final Result      Mild linear atelectasis or scarring in the right lung base associated with elevation of the right hemidiaphragm. Lungs are otherwise clear. Focal prominence of the descending thoracic aorta, aneurysm versus tortuosity, is again seen without change. Normal heart size. XR CHEST PORTABLE   Final Result   1. No findings for acute cardiopulmonary disease. XR CHEST PORTABLE   Final Result      1. No acute process or consolidation   2. NG tube remains in place               XR CHEST PORTABLE   Final Result   Impression: Enteric tube terminates within the mid gastric body. Possible trace left effusion. XR CHEST PORTABLE   Final Result      No acute pulmonary disease. IR LUMBAR PUNCTURE FOR DIAGNOSIS   Final Result      MRI BRAIN W WO CONTRAST   Final Result      1. Severely limited due to motion artifact. 2. No acute stroke, mass, or hemorrhage. 3. Severe confluent hyperintense FLAIR signal within the cerebral white matter, progressed compared to the prior study, nonspecific and could represent  acute toxic/metabolic leukoencephalopathy. Hypoxic ischemic encephalopathy is considered less likely    given the predominant white matter findings. 4. Symmetric foci of hyperintense T2/flair signal foci within the globus pallidus, suggestive of carbon monoxide poisoning. This is unchanged from the prior study. Given the lack of diffusion restriction, this is probably subacute to chronic. FL LUMBAR PUNCTURE DIAG   Final Result   Impression: Extensive fluoroscopically guided lumbar puncture was unsuccessful secondary to patient condition, altered mental status and constant movement. The patient was aborted due to safety concerns. The procedure can be attempted under anesthesia if    clinically appropriate. ASSESSMENT AND PLAN:    48 yom with low grade fever of unknown origin. Given his recent stroke, these fevers could be central in origin. Patient did have positive nitirites on UA so it could be that he has a UTI. It was communicated that patient was found to have tube feed in his mouth which could potentially represent an aspiration event given how encephalopathic he is. Sputum culture positive for MRSA. Serial CXRs revealed development of linear atelectasis RLL. Plan:  - continue vancomycin until procalcitonin results. If negative, can d'c vancomycin.   - continue ceftriaxone for uncomplicated cystitis for total course of 5 days (stop date: 5/9)    Code Status: Full code  FEN: Dietitian consulted, TFs paused, potential plan for PEG 5/7/20  PPX: Lovenox  DISPO: GMF, Home care vs SNF on

## 2020-05-06 NOTE — PROGRESS NOTES
Was able to let the patient's ance [facility] talk to the patient from the room phone. Pt did open his eyes for almost all of the conversation with [facility] which lasted about 2 minutes.      Electronically signed by Margaret Venegas RN on 5/6/2020 at 3:01 PM

## 2020-05-06 NOTE — PROGRESS NOTES
Progress Note    Admit Date: 4/28/2020  Day: 8  Diet: No diet orders on file    CC: AMS    Interval history: Pt did open his eyes and somewhat mumble yesterday to his family on facetime. No acute events overnight. Pt seen and examined at bedside this morning. This morning doing the same as yesterday, lethargic, not responding appropriately. Pupils are reactive and he does withdraw to pain. Vitals: Afebrile this AM (Tmax 100.6 yesterday PM), BP 120s-130s/70s, HR 70s-80s, 95% RA  Labs: WBCs 7.6 (<8.8<11.2<9.2<7.8), Hgb 11.5 (<12.4<12.9<13.4<14.1), Na 143, K 3.7, Cr 0.8 (0.7<0.8<0.9)  CXR (5/3, 5/4): No abnormalities  UA: Positive nitrites, 3+ bacteria  COVID-19: Negative x2  Blood Cx (4/29, 5/4): NGTD  Resp Cx: MRSA    HPI: 47 yo M with PMH bipolar disorder, depression and ADHD presented to [facility] with AMS and multiple falls. UDS with amphetamines, benzos and marijuana. LP yielded minimal fluid for analysis and MRI showed acute left parietal infarct, however neuro did not think this was the cause of his encephalopathy. He was transferred to [facility] for cvEEG given the concern for status epilepticus.       Medications:     Scheduled Meds:   cefTRIAXone (ROCEPHIN) IV  1 g Intravenous Q24H    vancomycin  1,250 mg Intravenous Q8H    atorvastatin  40 mg Per NG tube Daily    CENTRUM/CERTA-SUSAN with minerals oral  15 mL Per NG tube Daily    folic acid  1 mg Per NG tube Daily    aspirin  81 mg Per NG tube Daily    thiamine  100 mg Per NG tube Daily    pantoprazole  40 mg Intravenous Daily    enoxaparin  40 mg Subcutaneous Daily    lisinopril  10 mg Per NG tube Daily    valproate sodium (DEPACON) IVPB  500 mg Intravenous Q8H    [Held by provider] tamsulosin  0.4 mg Oral Nightly    sodium chloride flush  10 mL Intravenous 2 times per day    [Held by provider] LORazepam  0.5 mg Intravenous 3 times per day     Continuous Infusions:   sodium chloride 50 mL/hr at 05/05/20 2022     PRN Meds:QUEtiapine, polyethylene (start 5/5)  -Vancomycin (start 5/5)    HTN  -Resumed lisinopril 10 mg daily     Agitation  -PRN seroquel 25 mg BID, pt has not required  -Ativan discontinued    Dispo  -Poor prognosis per neuro, requires placement (hopeful for one that will accept TFs)  -PT (6/24) / OT (6/24)  -CW following: looking into [place] vs [facility]  -Nutrition: discussed PEG tube placement at length with dana/SETH Sampson (5/5/20) and she provided verbal consent to proceed    Code Status: Full code  FEN: Dietitian consulted, TFs paused, potential plan for PEG 5/7/20  PPX: Lovenox  DISPO: GMF, Home care vs SNF on discharge    Yany Zavala MD, PGY-1  05/06/20  10:16 AM    This patient will be staffed and discussed with Dr. Abel Quiñonez. Patient seen and examined, labs and imaging studies reviewed, agree with assessment and plan as outlined above. Continue with care.     Abel Quiñonez MD [place] [facility]

## 2020-05-06 NOTE — PROGRESS NOTES
NUTRITION ASSESSMENT  Admission Date: 4/28/2020     Type and Reason for Visit: Reassess    NUTRITION RECOMMENDATIONS:   · Recommend restarting TF s/p PEG: standard w/ fiber formula \"Jevity 1.2\" formula at 25 mL/hr and as tolerated, increase by 25  mL/hr q 4 hours until goal of 70 mL/hr  · Water bolus 110 mL H20 q 4 hours. · Monitor for tolerance (bowel habits, N/V, cramping, abdominal distention). · Recommend adding scheduled bowel regimen     NUTRITION ASSESSMENT:  Pt remains nutritionally compromised AEB malnutrition and NPO status. Pt continues with AMS. TF were started and held from 5/4 on r/t risk for aspiration and increased secretions. Possible PEG placement tomorrow. + BM. Recommend restarting TF w/PEG placement as pt has not had adequate nutrition x2 days. If pt w/o PEG placement, recommend restarting TF via NGT. Monitor TF tolerance of bowel habits, N/V, cramping, and distention. TF recs in note. Will continue to monitor per [facility].       MALNUTRITION ASSESSMENT  Context: Acute illness or injury   Malnutrition Status: Meets the criteria for moderate malnutrition  Findings of the 6 clinical characteristics of malnutrition (Minimum of 2 out of 6 clinical characteristics is required to make the diagnosis of moderate or severe Protein Calorie Malnutrition based on AND/ASPEN Guidelines):  Energy Intake %: Less than or equal to 75% of estimated energy requirement  Energy Intake Time: Greater than or equal to 5 days  Interpretation of Weight Loss %: 2% loss or greater  Interpretation of Weight Loss Time: in 1 week  Body Fat Status: Unable to assess  Muscle Mass Status: Unable to assess  Fluid Accumulation Status: No significant fluid accumulation  Reduced  Strength: Not measured    NUTRITION DIAGNOSIS   Problem: Inadequate Oral Intake  Etiology: Cognitive or neurological impairment  Signs & Symptoms: NPO status due to medical condition    NUTRITION INTERVENTION  Food and/or Nutrient Delivery:Continue NPO or

## 2020-05-06 NOTE — CONSULTS
Pharmacy to dose vancomycin consulted by Dr. Ebony Aquino. Pharmacy has been following the patient for vancomycin management. Please see the note completed today for details. Thank you for consulting pharmacy. Please call with any questions.     Rosendo Coughlin, PharmD  PGY1 Pharmacy Resident  Wireless: [phone] ([phone])  5/6/2020 3:49 PM

## 2020-05-06 NOTE — PROGRESS NOTES
Pt removed from BL soft upper wrist restraints.     Electronically signed by Tejas Rodriguez RN on 5/6/2020 at 11:21 AM

## 2020-05-06 NOTE — CONSULTS
Clinical Pharmacy Consult Note    Admit date: 4/28/2020    Interval update: Tmax 100.6. PEG tube placement possibly onThursday. Subjective/Objective:  49 yo M admitted with Metabolic encephalopathy 2/2 methamphetamine use. PMH with ADA, bipolar disorder, depression, ADHD. Patient initially presented with AMS and multiple falls to [facility], then trasnferred to [facility] for neurology work up. UDS from [facility] positive for amphetamines, benzos and marijuana. During the hospital course, patient became febrile on TF and WBC trended. Subsequently respiratory and blood culture collected. Today Respiratory culture came back positive for MRSA. Pharmacy is consulted to dose Vancomycin per Dr. Ne Treadwell    Pertinent Medications:  Ceftriaxone 1g IV q24h -- day #2  Vancomycin Pharmacy to dose -- day # 2   2500mg IV once   1250mg IV q8h    PERTINENT LABS:  Recent Labs     05/05/20  0352 05/06/20  0429    143   K 4.0 3.7    106   CO2 24 24   BUN 21* 19   CREATININE 0.8* 0.7*       Estimated Creatinine Clearance: 150 mL/min (A) (based on SCr of 0.7 mg/dL (L)). Recent Labs     05/05/20  0352 05/06/20  0429   WBC 8.8 7.6   HGB 12.4* 11.5*   HCT 35.3* 33.8*   MCV 91.2 92.4    263       Height:  6' (182.9 cm)  Weight: 215 lb 13.3 oz (97.9 kg)    Micro:  Date Site Micro Susceptibility   4/29 CSF Not detected    4/29 Blood NGTD    5/4 Respiratory MRSA    5/4 Blood Sent      Vanc level:  Date/time Type Level (mcg/mL)   5/6 1230 Trough  16.0      Assessment/Plan:  1. Aspiration Pneumonia vs. HAP:  vancomycin day #2  Vancomycin  · Trough level before 4th dose = 16.0, goal 15-20 for pneumonia. · Continue 1250mg IV q8h -- Provides ~ 13 mg/kg and is based on a half-life elimination of 6 hours. · Will repeat another trough in a couple days to ensure that the level is adaquate  · Renal function will be monitored closely and dosing will be adjusted as appropriate. Thank you for consulting pharmacy.  Please call

## 2020-05-06 NOTE — CONSULTS
Intravenous, Q24H  vancomycin (VANCOCIN) 1,250 mg in dextrose 5 % 250 mL IVPB, 1,250 mg, Intravenous, Q8H  atorvastatin (LIPITOR) tablet 40 mg, 40 mg, Per NG tube, Daily  CENTRUM/CERTA-SUSAN with minerals oral solution 15 mL, 15 mL, Per NG tube, Daily  folic acid (FOLVITE) tablet 1 mg, 1 mg, Per NG tube, Daily  aspirin chewable tablet 81 mg, 81 mg, Per NG tube, Daily  vitamin B-1 (THIAMINE) tablet 100 mg, 100 mg, Per NG tube, Daily  pantoprazole (PROTONIX) injection 40 mg, 40 mg, Intravenous, Daily  enoxaparin (LOVENOX) injection 40 mg, 40 mg, Subcutaneous, Daily  0.9 % sodium chloride infusion, , Intravenous, Continuous  lisinopril (PRINIVIL;ZESTRIL) tablet 10 mg, 10 mg, Per NG tube, Daily  QUEtiapine (SEROQUEL) tablet 25 mg, 25 mg, Per NG tube, BID PRN  polyethylene glycol (GLYCOLAX) packet 17 g, 17 g, Per NG tube, Daily PRN  promethazine (PHENERGAN) tablet 12.5 mg, 12.5 mg, Per NG tube, Q6H PRN **OR** ondansetron (ZOFRAN) injection 4 mg, 4 mg, Intravenous, Q6H PRN  valproate (DEPACON) 500 mg in dextrose 5 % 100 mL IVPB, 500 mg, Intravenous, Q8H  perflutren lipid microspheres (DEFINITY) injection 1.65 mg, 1.5 mL, Intravenous, ONCE PRN  HYDROmorphone (DILAUDID) injection 0.25 mg, 0.25 mg, Intravenous, Q5 Min PRN  HYDROmorphone (DILAUDID) injection 0.5 mg, 0.5 mg, Intravenous, Q5 Min PRN  morphine injection 1 mg, 1 mg, Intravenous, Q5 Min PRN  HYDROmorphone (DILAUDID) injection 0.5 mg, 0.5 mg, Intravenous, Q5 Min PRN  labetalol (NORMODYNE;TRANDATE) injection syringe 5 mg, 5 mg, Intravenous, Q10 Min PRN  hydrALAZINE (APRESOLINE) injection 5 mg, 5 mg, Intravenous, Q10 Min PRN  meperidine (DEMEROL) injection 12.5 mg, 12.5 mg, Intravenous, Q5 Min PRN  [Held by provider] tamsulosin (FLOMAX) capsule 0.4 mg, 0.4 mg, Oral, Nightly  sodium chloride flush 0.9 % injection 10 mL, 10 mL, Intravenous, 2 times per day  sodium chloride flush 0.9 % injection 10 mL, 10 mL, Intravenous, PRN  acetaminophen (TYLENOL) tablet 650 mg, 650

## 2020-05-06 NOTE — PROGRESS NOTES
Labs obtained for PT/INR and Vancomycin trough.     Electronically signed by Bisi Tolentino RN on 5/6/2020 at 1:09 PM

## 2020-05-07 ENCOUNTER — ANESTHESIA (OUTPATIENT)
Dept: ENDOSCOPY | Age: 55
DRG: 052 | End: 2020-05-07
Payer: COMMERCIAL

## 2020-05-07 ENCOUNTER — ANESTHESIA EVENT (OUTPATIENT)
Dept: ENDOSCOPY | Age: 55
DRG: 052 | End: 2020-05-07
Payer: COMMERCIAL

## 2020-05-07 VITALS — OXYGEN SATURATION: 100 % | DIASTOLIC BLOOD PRESSURE: 77 MMHG | SYSTOLIC BLOOD PRESSURE: 132 MMHG

## 2020-05-07 LAB
ANION GAP SERPL CALCULATED.3IONS-SCNC: 13 MMOL/L (ref 3–16)
BUN BLDV-MCNC: 15 MG/DL (ref 7–20)
CALCIUM SERPL-MCNC: 8.8 MG/DL (ref 8.3–10.6)
CHLORIDE BLD-SCNC: 105 MMOL/L (ref 99–110)
CO2: 24 MMOL/L (ref 21–32)
CREAT SERPL-MCNC: 0.7 MG/DL (ref 0.9–1.3)
CULTURE, RESPIRATORY: ABNORMAL
CULTURE, RESPIRATORY: ABNORMAL
GFR AFRICAN AMERICAN: >60
GFR NON-AFRICAN AMERICAN: >60
GLUCOSE BLD-MCNC: 90 MG/DL (ref 70–99)
GRAM STAIN RESULT: ABNORMAL
HCT VFR BLD CALC: 33.2 % (ref 40.5–52.5)
HEMOGLOBIN: 11.6 G/DL (ref 13.5–17.5)
MCH RBC QN AUTO: 32 PG (ref 26–34)
MCHC RBC AUTO-ENTMCNC: 34.9 G/DL (ref 31–36)
MCV RBC AUTO: 91.7 FL (ref 80–100)
ORGANISM: ABNORMAL
PDW BLD-RTO: 14.5 % (ref 12.4–15.4)
PLATELET # BLD: 287 K/UL (ref 135–450)
PMV BLD AUTO: 8.2 FL (ref 5–10.5)
POTASSIUM REFLEX MAGNESIUM: 3.6 MMOL/L (ref 3.5–5.1)
RBC # BLD: 3.62 M/UL (ref 4.2–5.9)
SODIUM BLD-SCNC: 142 MMOL/L (ref 136–145)
WBC # BLD: 5.7 K/UL (ref 4–11)

## 2020-05-07 PROCEDURE — 85027 COMPLETE CBC AUTOMATED: CPT

## 2020-05-07 PROCEDURE — 2500000003 HC RX 250 WO HCPCS: Performed by: NURSE PRACTITIONER

## 2020-05-07 PROCEDURE — 2500000003 HC RX 250 WO HCPCS: Performed by: ANESTHESIOLOGY

## 2020-05-07 PROCEDURE — 80048 BASIC METABOLIC PNL TOTAL CA: CPT

## 2020-05-07 PROCEDURE — 2580000003 HC RX 258: Performed by: INTERNAL MEDICINE

## 2020-05-07 PROCEDURE — 2580000003 HC RX 258: Performed by: NURSE PRACTITIONER

## 2020-05-07 PROCEDURE — 7100000010 HC PHASE II RECOVERY - FIRST 15 MIN: Performed by: INTERNAL MEDICINE

## 2020-05-07 PROCEDURE — 6360000002 HC RX W HCPCS: Performed by: ANESTHESIOLOGY

## 2020-05-07 PROCEDURE — 3609013300 HC EGD TUBE PLACEMENT: Performed by: INTERNAL MEDICINE

## 2020-05-07 PROCEDURE — 7100000011 HC PHASE II RECOVERY - ADDTL 15 MIN: Performed by: INTERNAL MEDICINE

## 2020-05-07 PROCEDURE — 2580000003 HC RX 258: Performed by: ANESTHESIOLOGY

## 2020-05-07 PROCEDURE — 6370000000 HC RX 637 (ALT 250 FOR IP): Performed by: INTERNAL MEDICINE

## 2020-05-07 PROCEDURE — C9113 INJ PANTOPRAZOLE SODIUM, VIA: HCPCS | Performed by: STUDENT IN AN ORGANIZED HEALTH CARE EDUCATION/TRAINING PROGRAM

## 2020-05-07 PROCEDURE — 6360000002 HC RX W HCPCS: Performed by: NURSE ANESTHETIST, CERTIFIED REGISTERED

## 2020-05-07 PROCEDURE — 2580000003 HC RX 258: Performed by: STUDENT IN AN ORGANIZED HEALTH CARE EDUCATION/TRAINING PROGRAM

## 2020-05-07 PROCEDURE — 2060000000 HC ICU INTERMEDIATE R&B

## 2020-05-07 PROCEDURE — 6360000002 HC RX W HCPCS: Performed by: STUDENT IN AN ORGANIZED HEALTH CARE EDUCATION/TRAINING PROGRAM

## 2020-05-07 PROCEDURE — 3609012400 HC EGD TRANSORAL BIOPSY SINGLE/MULTIPLE: Performed by: INTERNAL MEDICINE

## 2020-05-07 PROCEDURE — 3700000000 HC ANESTHESIA ATTENDED CARE: Performed by: INTERNAL MEDICINE

## 2020-05-07 PROCEDURE — 0DH63UZ INSERTION OF FEEDING DEVICE INTO STOMACH, PERCUTANEOUS APPROACH: ICD-10-PCS | Performed by: INTERNAL MEDICINE

## 2020-05-07 PROCEDURE — 88305 TISSUE EXAM BY PATHOLOGIST: CPT

## 2020-05-07 PROCEDURE — 6360000002 HC RX W HCPCS: Performed by: INTERNAL MEDICINE

## 2020-05-07 PROCEDURE — 3700000001 HC ADD 15 MINUTES (ANESTHESIA): Performed by: INTERNAL MEDICINE

## 2020-05-07 PROCEDURE — 2500000003 HC RX 250 WO HCPCS: Performed by: INTERNAL MEDICINE

## 2020-05-07 PROCEDURE — 99232 SBSQ HOSP IP/OBS MODERATE 35: CPT | Performed by: INTERNAL MEDICINE

## 2020-05-07 PROCEDURE — 2709999900 HC NON-CHARGEABLE SUPPLY: Performed by: INTERNAL MEDICINE

## 2020-05-07 RX ORDER — LIDOCAINE HYDROCHLORIDE 10 MG/ML
INJECTION, SOLUTION INFILTRATION; PERINEURAL PRN
Status: DISCONTINUED | OUTPATIENT
Start: 2020-05-07 | End: 2020-05-07 | Stop reason: SDUPTHER

## 2020-05-07 RX ORDER — SODIUM CHLORIDE 9 MG/ML
INJECTION, SOLUTION INTRAVENOUS CONTINUOUS PRN
Status: DISCONTINUED | OUTPATIENT
Start: 2020-05-07 | End: 2020-05-07 | Stop reason: SDUPTHER

## 2020-05-07 RX ORDER — PROPOFOL 10 MG/ML
INJECTION, EMULSION INTRAVENOUS PRN
Status: DISCONTINUED | OUTPATIENT
Start: 2020-05-07 | End: 2020-05-07 | Stop reason: SDUPTHER

## 2020-05-07 RX ORDER — FENTANYL CITRATE 50 UG/ML
INJECTION, SOLUTION INTRAMUSCULAR; INTRAVENOUS PRN
Status: DISCONTINUED | OUTPATIENT
Start: 2020-05-07 | End: 2020-05-07 | Stop reason: SDUPTHER

## 2020-05-07 RX ADMIN — SODIUM CHLORIDE: 9 INJECTION, SOLUTION INTRAVENOUS at 12:49

## 2020-05-07 RX ADMIN — MUPIROCIN: 20 OINTMENT TOPICAL at 20:42

## 2020-05-07 RX ADMIN — FENTANYL CITRATE 50 MCG: 50 INJECTION INTRAMUSCULAR; INTRAVENOUS at 13:09

## 2020-05-07 RX ADMIN — PROPOFOL 50 MG: 10 INJECTION, EMULSION INTRAVENOUS at 13:01

## 2020-05-07 RX ADMIN — PROPOFOL 50 MG: 10 INJECTION, EMULSION INTRAVENOUS at 13:07

## 2020-05-07 RX ADMIN — PROPOFOL 40 MG: 10 INJECTION, EMULSION INTRAVENOUS at 12:57

## 2020-05-07 RX ADMIN — PROPOFOL 30 MG: 10 INJECTION, EMULSION INTRAVENOUS at 13:10

## 2020-05-07 RX ADMIN — PROPOFOL 50 MG: 10 INJECTION, EMULSION INTRAVENOUS at 12:55

## 2020-05-07 RX ADMIN — VANCOMYCIN HYDROCHLORIDE 1250 MG: 10 INJECTION, POWDER, LYOPHILIZED, FOR SOLUTION INTRAVENOUS at 06:03

## 2020-05-07 RX ADMIN — PANTOPRAZOLE SODIUM 40 MG: 40 INJECTION, POWDER, FOR SOLUTION INTRAVENOUS at 06:04

## 2020-05-07 RX ADMIN — VANCOMYCIN HYDROCHLORIDE 1250 MG: 10 INJECTION, POWDER, LYOPHILIZED, FOR SOLUTION INTRAVENOUS at 15:57

## 2020-05-07 RX ADMIN — LIDOCAINE HYDROCHLORIDE 50 MG: 10 INJECTION, SOLUTION INFILTRATION; PERINEURAL at 12:54

## 2020-05-07 RX ADMIN — VALPROATE SODIUM 500 MG: 100 INJECTION, SOLUTION INTRAVENOUS at 20:41

## 2020-05-07 RX ADMIN — VALPROATE SODIUM 500 MG: 100 INJECTION, SOLUTION INTRAVENOUS at 14:44

## 2020-05-07 RX ADMIN — SODIUM CHLORIDE: 9 INJECTION, SOLUTION INTRAVENOUS at 00:57

## 2020-05-07 RX ADMIN — PROPOFOL 30 MG: 10 INJECTION, EMULSION INTRAVENOUS at 13:04

## 2020-05-07 RX ADMIN — FENTANYL CITRATE 50 MCG: 50 INJECTION INTRAMUSCULAR; INTRAVENOUS at 13:04

## 2020-05-07 RX ADMIN — VALPROATE SODIUM 500 MG: 100 INJECTION, SOLUTION INTRAVENOUS at 04:44

## 2020-05-07 ASSESSMENT — PAIN SCALES - PAIN ASSESSMENT IN ADVANCED DEMENTIA (PAINAD)
TOTALSCORE: 0
BODYLANGUAGE: 0
NEGVOCALIZATION: 0
TOTALSCORE: 0
BREATHING: 0
NEGVOCALIZATION: 0
TOTALSCORE: 0
BODYLANGUAGE: 0
BODYLANGUAGE: 0
CONSOLABILITY: 0
BREATHING: 0
FACIALEXPRESSION: 0
FACIALEXPRESSION: 0
CONSOLABILITY: 0
FACIALEXPRESSION: 0
BODYLANGUAGE: 0
BREATHING: 0
TOTALSCORE: 0
NEGVOCALIZATION: 0
CONSOLABILITY: 0
BREATHING: 0
NEGVOCALIZATION: 0
FACIALEXPRESSION: 0
CONSOLABILITY: 0

## 2020-05-07 ASSESSMENT — PAIN SCALES - GENERAL
PAINLEVEL_OUTOF10: 0

## 2020-05-07 ASSESSMENT — PULMONARY FUNCTION TESTS
PIF_VALUE: 0
PIF_VALUE: 0

## 2020-05-07 NOTE — PROGRESS NOTES
Patient's mother Dianna Hopes updated. She would like to facetime Parveen at some point tomorrow day.      Electronically signed by Odalis Duncan RN on 5/6/2020 at 10:53 PM

## 2020-05-07 NOTE — PROGRESS NOTES
[address] [place] [address]   Pre-operative History and Physical    Patient: Gerhardt Level  : 1965     History Obtained From:  electronic medical record    HISTORY OF PRESENT ILLNESS:    The patient is a 48 y.o. male who presents for an EGD for PEG. Past Medical History:        Diagnosis Date    ADHD     Arthritis     Bipolar 1 disorder ([facility] [address]     Depression     Hypertension     Sleep apnea      Past Surgical History:        Procedure Laterality Date    FRACTURE SURGERY Right     FX Tibia / Fibula    LEG SURGERY Right     Remove Plate    SHOULDER ARTHROSCOPY Right 2019    RIGHT SHOULDER ARTHROSCOPY,  ROTATOR CUFF REPAIR, SUBACROMIC DECOMPRESSION, LABRIAL DEBRIDEMENT performed by Isabelle Rosales MD at [facility]      x 4     Medications Prior to Admission:   No current facility-administered medications on file prior to encounter. Current Outpatient Medications on File Prior to Encounter   Medication Sig Dispense Refill    famotidine (PEPCID) 20 MG tablet Take 1 tablet by mouth 2 times daily 60 tablet 3    tamsulosin (FLOMAX) 0.4 MG capsule Take 1 capsule by mouth nightly 30 capsule 3    polyethylene glycol (GLYCOLAX) packet Take 17 g by mouth daily as needed for Constipation 527 g 1    lisinopril (PRINIVIL;ZESTRIL) 10 MG tablet Take 1 tablet by mouth daily 30 tablet 3    aspirin 81 MG EC tablet Take 1 tablet by mouth daily 30 tablet 3    atorvastatin (LIPITOR) 10 MG tablet Take 1 tablet by mouth daily 30 tablet 3     Allergies:  Reglan [metoclopramide]    History of allergic reaction to anesthesia:  No    Social History:   TOBACCO:   reports that he has been smoking cigarettes. He has been smoking about 0.50 packs per day. He has never used smokeless tobacco.  ETOH:   reports current alcohol use. DRUGS:   reports current drug use. Drug: Marijuana. Family History:   History reviewed. No pertinent family history.     PHYSICAL EXAM:      /81   Pulse 78   Temp 99.6 °F (37.6 °C) (Axillary)   Resp 18   Ht 6' (1.829 m)   Wt 215 lb 13.3 oz (97.9 kg)   SpO2 95%   BMI 29.27 kg/m²  I        Heart:  No m/r/g +s1/s2 RRR    Lungs:  CTA bilaterally    Abdomen:  Soft, nontender, non distended; +bs    ASA Grade:  ASA 3 - Patient with moderate systemic disease with functional limitations    Mallampati Class:  Class I: Soft palate, uvula, fauces, pillars visible  __________  Class II: Soft palate, uvula, fauces visible  __________   Class III: Soft palate, base of uvula visible  ______x____  Class IV: Hard palate only visible   __________      ASSESSMENT AND PLAN:    1. Patient is a 48 y.o. male here for EGD with PEG placement with deep sedation  2. Procedure options, risks and benefits reviewed with patient's girlfriend. We specifically discussed that risks include, but are not limited to infection, bleeding, perforation, death, and missed lesions. Guardian expresses understanding. She was called before the procedure to confirm she did not want to delay the procedure and she confirmed this via telephone. She mentioned her son was murdered and she wanted to speak to the patient's mother, but she also didn't want to delay his care. She also mentioned she has a lawsuit pending at a nursing home. I confirmed once again she would like to proceed with the procedure and she said yes. I gave her number for the hospitalist to discuss concerns regarding placement.

## 2020-05-07 NOTE — CONSULTS
Clinical Pharmacy Consult Note    Admit date: 4/28/2020    Interval update: Tmax 99.9. PEG tube placement today     Subjective/Objective:  51 yo M admitted with Metabolic encephalopathy 2/2 methamphetamine use. PMH with ADHD, bipolar disorder, depression, ADHD. Patient initially presented with AMS and multiple falls to [facility], then trasnferred to [facility] for neurology work up. UDS from [facility] positive for amphetamines, benzos and marijuana. During the hospital course, patient became febrile on TF and WBC trended. Subsequently respiratory and blood culture collected. Today Respiratory culture came back positive for MRSA. Pharmacy is consulted to dose Vancomycin per Dr. Cortes Dumont    Pertinent Medications:  Ceftriaxone 1g IV q24h -- day #2  Vancomycin Pharmacy to dose -- day # 2   2500mg IV once   1250mg IV q8h    PERTINENT LABS:  Recent Labs     05/06/20  0429 05/07/20  0455    142   K 3.7 3.6    105   CO2 24 24   BUN 19 15   CREATININE 0.7* 0.7*       Estimated Creatinine Clearance: 150 mL/min (A) (based on SCr of 0.7 mg/dL (L)). Recent Labs     05/06/20 0429 05/07/20  0455   WBC 7.6 5.7   HGB 11.5* 11.6*   HCT 33.8* 33.2*   MCV 92.4 91.7    287       Height:  6' (182.9 cm)  Weight: 215 lb 13.3 oz (97.9 kg)    Micro:  Date Site Micro Susceptibility   4/29 CSF Not detected    4/29 Blood NGTD    5/4 Respiratory MRSA    5/4 Blood NGTD      Vanc level:  Date/time Type Level (mcg/mL)   5/6 1230 Trough  16.0      Assessment/Plan:  1. Aspiration Pneumonia vs. HAP:  vancomycin day #2  Vancomycin  · Trough level before 4th dose = 16.0, goal 15-20 for pneumonia. · Continue 1250mg IV q8h -- Provides ~ 13 mg/kg and is based on a half-life elimination of 6 hours. · Will order trough before 9th dose on 5/8 Friday AM  · Renal function will be monitored closely and dosing will be adjusted as appropriate. Thank you for consulting pharmacy. Please call with any questions.     Toyin Hoover, PharmD  PGY1

## 2020-05-07 NOTE — PROGRESS NOTES
hydrALAZINE, meperidine, sodium chloride flush, acetaminophen **OR** acetaminophen    Objective:   Vitals:   T-max:  Patient Vitals for the past 8 hrs:   BP Temp Temp src Pulse Resp SpO2   05/07/20 1045 130/76 98.7 °F (37.1 °C) -- 68 20 99 %   05/07/20 0745 128/84 98 °F (36.7 °C) Axillary 64 -- 94 %   05/07/20 0443 128/81 99.6 °F (37.6 °C) Axillary 78 18 95 %       Intake/Output Summary (Last 24 hours) at 5/7/2020 1223  Last data filed at 5/7/2020 5781  Gross per 24 hour   Intake 1820 ml   Output 750 ml   Net 1070 ml       Review of Systems   Unable to perform ROS: Mental status change     Physical Exam  Vitals signs and nursing note reviewed. Constitutional:       General: He is not in acute distress. Appearance: He is not diaphoretic. Comments: Lethargic   HENT:      Head: Normocephalic and atraumatic. Nose: Nose normal. No rhinorrhea. Mouth/Throat:      Mouth: Mucous membranes are moist.      Pharynx: No posterior oropharyngeal erythema. Eyes:      General: No scleral icterus. Conjunctiva/sclera: Conjunctivae normal.      Pupils: Pupils are equal, round, and reactive to light. Comments: Does not track   Neck:      Musculoskeletal: Normal range of motion and neck supple. Cardiovascular:      Rate and Rhythm: Normal rate and regular rhythm. Heart sounds: No friction rub. No gallop. Pulmonary:      Effort: Pulmonary effort is normal. No respiratory distress. Breath sounds: No stridor. No rhonchi. Chest:      Chest wall: No tenderness. Abdominal:      General: Bowel sounds are normal. There is no distension. Palpations: Abdomen is soft. Tenderness: There is no abdominal tenderness. Musculoskeletal:         General: No tenderness. Right lower leg: No edema. Left lower leg: No edema. Skin:     General: Skin is warm and dry. Coloration: Skin is not jaundiced or pale. Neurological:      Comments: Sleeping comfortably, awakens to voice.  Does Blood cultures NGTD. -Rocephin (start 5/5)  -Vancomycin (start 5/5)    HTN  -Resumed lisinopril 10 mg daily     Agitation  -PRN seroquel 25 mg BID, pt has not required  -Ativan discontinued    Dispo  -Poor prognosis per neuro, requires placement  -PT (6/24) / OT (6/24)  -CW following: looking into [facility] vs [facility]  -Nutrition: discussed PEG tube placement at length with dana/SETH Velasquez Falling (5/5/20) and she provided verbal consent to proceed, PEG tube placement (5/7)    Code Status: Full code  FEN: Dietitian consulted, TFs paused, potential plan for PEG 5/7/20    PPX: Lovenox  DISPO: [facility], Home care vs SNF on discharge    Gregory Ervin MD, PGY-1  05/07/20  12:23 PM    This patient will be staffed and discussed with attending physician Dr. Christa Bella. Patient seen and examined, labs and imaging studies reviewed, agree with assessment and plan as outlined above. Continue with care late entry on 5/8 I discussed risks and benefits of peg with patients family significant other and mother they are both in agreement.       MD Mylene Ryan

## 2020-05-07 NOTE — PROGRESS NOTES
Return call to [place], [address]. Explained that procedure went well and patient is stable. Pt continues to state that she has not spoken to any staff member today and wishes to speak with a doctor regarding his care. All questions/concerns answered and stated will call her if there is any change.  Laura Montoya

## 2020-05-07 NOTE — ANESTHESIA PRE PROCEDURE
Department of Anesthesiology  Preprocedure Note       Name:  Zion Escalona   Age:  48 y.o.  :  1965                                          MRN:  [id number]         Date:  2020      Surgeon: Nidia Katz):  Senia Gutiérrez MD    Procedure: EGD PEG TUBE PLACEMENT (N/A )    Medications prior to admission:   Prior to Admission medications    Medication Sig Start Date End Date Taking? Authorizing Provider   QUEtiapine (SEROQUEL) 25 MG tablet Take 1 tablet by mouth 2 times daily as needed for Agitation 20  Fidelina Farah MD   famotidine (PEPCID) 20 MG tablet Take 1 tablet by mouth 2 times daily 20   Lisandro Navarrete MD   tamsulosin (FLOMAX) 0.4 MG capsule Take 1 capsule by mouth nightly 20   Lisandro Navarrete MD   polyethylene glycol (GLYCOLAX) packet Take 17 g by mouth daily as needed for Constipation 20  Lisandro Navarrete MD   lisinopril (PRINIVIL;ZESTRIL) 10 MG tablet Take 1 tablet by mouth daily 20   Lisandro Navarrete MD   aspirin 81 MG EC tablet Take 1 tablet by mouth daily 20   Lisandro Navarrete MD   atorvastatin (LIPITOR) 10 MG tablet Take 1 tablet by mouth daily 20   Lisandro Navarrete MD       Current medications:    No current facility-administered medications for this visit.       Current Outpatient Medications   Medication Sig Dispense Refill    QUEtiapine (SEROQUEL) 25 MG tablet Take 1 tablet by mouth 2 times daily as needed for Agitation 60 tablet 0     Facility-Administered Medications Ordered in Other Visits   Medication Dose Route Frequency Provider Last Rate Last Dose    mupirocin (BACTROBAN) 2 % ointment   Nasal BID Sarika Clark MD        vancomycin (VANCOCIN) 1,250 mg in dextrose 5 % 250 mL IVPB  1,250 mg Intravenous Q8H Fidelina Farah .7 mL/hr at 20 0603 1,250 mg at 20 0603    atorvastatin (LIPITOR) tablet 40 mg  40 mg Per NG tube Daily Fidelina Farah MD   Stopped at 20 0783    CENTRUM/CERTA-SUSAN syringe 5 mg  5 mg Intravenous Q10 Min PRN Nuha Pack MD        hydrALAZINE (APRESOLINE) injection 5 mg  5 mg Intravenous Q10 Min PRN Nuha Pack MD        meperidine (DEMEROL) injection 12.5 mg  12.5 mg Intravenous Q5 Min PRN Nuha Pack MD        [facility]] tamsulosin [facility]) capsule 0.4 mg  0.4 mg Oral Nightly Masood Acevedo MD   0.4 mg at 05/04/20 2021    sodium chloride flush 0.9 % injection 10 mL  10 mL Intravenous 2 times per day Masood Acevedo MD   10 mL at 05/05/20 2021    sodium chloride flush 0.9 % injection 10 mL  10 mL Intravenous PRN Masood Acevedo MD        acetaminophen (TYLENOL) tablet 650 mg  650 mg Oral Q6H PRN Masood Acevedo MD   650 mg at 05/05/20 0349    Or    acetaminophen (TYLENOL) suppository 650 mg  650 mg Rectal Q6H PRN Masood Acevedo MD   650 mg at 05/05/20 1951       Allergies:     Allergies   Allergen Reactions    Reglan [Metoclopramide]      Lock jaw       Problem List:    Patient Active Problem List   Diagnosis Code    Complete tear of right rotator cuff M75.121    Elevated troponin R79.89    Altered mental status R41.82    Frequent falls R29.6    Hypertension I10    Cerebrovascular accident (CVA) ([address] Y76.3    Metabolic encephalopathy L81.45    AMS (altered mental status) R41.82    MRSA infection A49.02    Polysubstance abuse ([address] F19.10       Past Medical History:        Diagnosis Date    ADHD     Arthritis     Bipolar 1 disorder ([address]     Depression     Hypertension     Sleep apnea        Past Surgical History:        Procedure Laterality Date    FRACTURE SURGERY Right     FX Tibia / Fibula    LEG SURGERY Right     Remove Plate    SHOULDER ARTHROSCOPY Right 11/8/2019    RIGHT SHOULDER ARTHROSCOPY,  ROTATOR CUFF REPAIR, SUBACROMIC DECOMPRESSION, LABRIAL DEBRIDEMENT performed by Cori Peña MD at [facility]      x 4       Social History:    Social History     Tobacco Use    Smoking status: Current Every Day Smoker

## 2020-05-08 LAB
ANION GAP SERPL CALCULATED.3IONS-SCNC: 15 MMOL/L (ref 3–16)
BLOOD CULTURE, ROUTINE: NORMAL
BUN BLDV-MCNC: 12 MG/DL (ref 7–20)
CALCIUM SERPL-MCNC: 8.8 MG/DL (ref 8.3–10.6)
CHLORIDE BLD-SCNC: 104 MMOL/L (ref 99–110)
CO2: 23 MMOL/L (ref 21–32)
CREAT SERPL-MCNC: 0.7 MG/DL (ref 0.9–1.3)
CULTURE, BLOOD 2: NORMAL
GFR AFRICAN AMERICAN: >60
GFR NON-AFRICAN AMERICAN: >60
GLUCOSE BLD-MCNC: 107 MG/DL (ref 70–99)
GLUCOSE BLD-MCNC: 97 MG/DL (ref 70–99)
HCT VFR BLD CALC: 34.5 % (ref 40.5–52.5)
HEMOGLOBIN: 11.7 G/DL (ref 13.5–17.5)
MAGNESIUM: 2 MG/DL (ref 1.8–2.4)
MCH RBC QN AUTO: 31.4 PG (ref 26–34)
MCHC RBC AUTO-ENTMCNC: 34 G/DL (ref 31–36)
MCV RBC AUTO: 92.2 FL (ref 80–100)
PDW BLD-RTO: 14.8 % (ref 12.4–15.4)
PERFORMED ON: ABNORMAL
PLATELET # BLD: 334 K/UL (ref 135–450)
PMV BLD AUTO: 8.1 FL (ref 5–10.5)
POTASSIUM REFLEX MAGNESIUM: 3.2 MMOL/L (ref 3.5–5.1)
RBC # BLD: 3.74 M/UL (ref 4.2–5.9)
SODIUM BLD-SCNC: 142 MMOL/L (ref 136–145)
VANCOMYCIN TROUGH: 16.3 UG/ML (ref 10–20)
WBC # BLD: 5.8 K/UL (ref 4–11)

## 2020-05-08 PROCEDURE — C9113 INJ PANTOPRAZOLE SODIUM, VIA: HCPCS | Performed by: INTERNAL MEDICINE

## 2020-05-08 PROCEDURE — 85027 COMPLETE CBC AUTOMATED: CPT

## 2020-05-08 PROCEDURE — 2500000003 HC RX 250 WO HCPCS: Performed by: INTERNAL MEDICINE

## 2020-05-08 PROCEDURE — 6370000000 HC RX 637 (ALT 250 FOR IP): Performed by: INTERNAL MEDICINE

## 2020-05-08 PROCEDURE — 2060000000 HC ICU INTERMEDIATE R&B

## 2020-05-08 PROCEDURE — 80048 BASIC METABOLIC PNL TOTAL CA: CPT

## 2020-05-08 PROCEDURE — 83735 ASSAY OF MAGNESIUM: CPT

## 2020-05-08 PROCEDURE — 80202 ASSAY OF VANCOMYCIN: CPT

## 2020-05-08 PROCEDURE — 99232 SBSQ HOSP IP/OBS MODERATE 35: CPT | Performed by: INTERNAL MEDICINE

## 2020-05-08 PROCEDURE — 2580000003 HC RX 258: Performed by: INTERNAL MEDICINE

## 2020-05-08 PROCEDURE — 6360000002 HC RX W HCPCS: Performed by: STUDENT IN AN ORGANIZED HEALTH CARE EDUCATION/TRAINING PROGRAM

## 2020-05-08 PROCEDURE — 6360000002 HC RX W HCPCS: Performed by: INTERNAL MEDICINE

## 2020-05-08 RX ORDER — FAMOTIDINE 20 MG/1
20 TABLET, FILM COATED ORAL 2 TIMES DAILY
Qty: 60 TABLET | Refills: 0 | Status: SHIPPED | OUTPATIENT
Start: 2020-05-08 | End: 2022-03-22 | Stop reason: SDUPTHER

## 2020-05-08 RX ORDER — LINEZOLID 600 MG/1
600 TABLET, FILM COATED ORAL 2 TIMES DAILY
Qty: 10 TABLET | Refills: 0 | Status: SHIPPED | OUTPATIENT
Start: 2020-05-09 | End: 2020-05-09 | Stop reason: SDUPTHER

## 2020-05-08 RX ORDER — ATORVASTATIN CALCIUM 40 MG/1
40 TABLET, FILM COATED ORAL DAILY
Qty: 30 TABLET | Refills: 0 | Status: SHIPPED | OUTPATIENT
Start: 2020-05-08 | End: 2020-05-09

## 2020-05-08 RX ORDER — POLYETHYLENE GLYCOL 3350 17 G/17G
17 POWDER, FOR SOLUTION ORAL DAILY PRN
Qty: 527 G | Refills: 1 | Status: SHIPPED | OUTPATIENT
Start: 2020-05-08 | End: 2020-06-07

## 2020-05-08 RX ORDER — QUETIAPINE FUMARATE 25 MG/1
25 TABLET, FILM COATED ORAL 2 TIMES DAILY PRN
Qty: 60 TABLET | Refills: 0 | Status: SHIPPED | OUTPATIENT
Start: 2020-05-08 | End: 2020-07-22 | Stop reason: ALTCHOICE

## 2020-05-08 RX ORDER — POTASSIUM CHLORIDE 7.45 MG/ML
10 INJECTION INTRAVENOUS
Status: COMPLETED | OUTPATIENT
Start: 2020-05-08 | End: 2020-05-08

## 2020-05-08 RX ORDER — LISINOPRIL 10 MG/1
10 TABLET ORAL DAILY
Qty: 30 TABLET | Refills: 0 | Status: SHIPPED
Start: 2020-05-08 | End: 2020-07-22 | Stop reason: ALTCHOICE

## 2020-05-08 RX ADMIN — VANCOMYCIN HYDROCHLORIDE 1250 MG: 10 INJECTION, POWDER, LYOPHILIZED, FOR SOLUTION INTRAVENOUS at 00:11

## 2020-05-08 RX ADMIN — POTASSIUM CHLORIDE 10 MEQ: 10 INJECTION, SOLUTION INTRAVENOUS at 14:39

## 2020-05-08 RX ADMIN — VALPROATE SODIUM 500 MG: 100 INJECTION, SOLUTION INTRAVENOUS at 04:30

## 2020-05-08 RX ADMIN — POTASSIUM CHLORIDE 10 MEQ: 10 INJECTION, SOLUTION INTRAVENOUS at 13:31

## 2020-05-08 RX ADMIN — ENOXAPARIN SODIUM 40 MG: 40 INJECTION SUBCUTANEOUS at 11:06

## 2020-05-08 RX ADMIN — POTASSIUM CHLORIDE 10 MEQ: 10 INJECTION, SOLUTION INTRAVENOUS at 12:10

## 2020-05-08 RX ADMIN — ATORVASTATIN CALCIUM 40 MG: 40 TABLET, FILM COATED ORAL at 09:46

## 2020-05-08 RX ADMIN — MUPIROCIN: 20 OINTMENT TOPICAL at 11:07

## 2020-05-08 RX ADMIN — Medication 100 MG: at 09:44

## 2020-05-08 RX ADMIN — PANTOPRAZOLE SODIUM 40 MG: 40 INJECTION, POWDER, FOR SOLUTION INTRAVENOUS at 11:06

## 2020-05-08 RX ADMIN — ASPIRIN 81 MG 81 MG: 81 TABLET ORAL at 09:44

## 2020-05-08 RX ADMIN — Medication 10 ML: at 21:24

## 2020-05-08 RX ADMIN — POTASSIUM CHLORIDE 10 MEQ: 10 INJECTION, SOLUTION INTRAVENOUS at 11:01

## 2020-05-08 RX ADMIN — FOLIC ACID 1 MG: 1 TABLET ORAL at 09:43

## 2020-05-08 RX ADMIN — VALPROATE SODIUM 500 MG: 100 INJECTION, SOLUTION INTRAVENOUS at 15:41

## 2020-05-08 RX ADMIN — LISINOPRIL 10 MG: 10 TABLET ORAL at 09:43

## 2020-05-08 RX ADMIN — VANCOMYCIN HYDROCHLORIDE 1250 MG: 10 INJECTION, POWDER, LYOPHILIZED, FOR SOLUTION INTRAVENOUS at 18:33

## 2020-05-08 RX ADMIN — MUPIROCIN: 20 OINTMENT TOPICAL at 21:24

## 2020-05-08 RX ADMIN — VANCOMYCIN HYDROCHLORIDE 1250 MG: 10 INJECTION, POWDER, LYOPHILIZED, FOR SOLUTION INTRAVENOUS at 09:44

## 2020-05-08 ASSESSMENT — PAIN SCALES - PAIN ASSESSMENT IN ADVANCED DEMENTIA (PAINAD)
TOTALSCORE: 0
TOTALSCORE: 0
BREATHING: 0
TOTALSCORE: 0
BREATHING: 0
FACIALEXPRESSION: 0
BODYLANGUAGE: 0
CONSOLABILITY: 0
TOTALSCORE: 0
TOTALSCORE: 0
NEGVOCALIZATION: 0
TOTALSCORE: 0
NEGVOCALIZATION: 0
CONSOLABILITY: 0
BODYLANGUAGE: 0
BODYLANGUAGE: 0
TOTALSCORE: 0
BODYLANGUAGE: 0
NEGVOCALIZATION: 0
CONSOLABILITY: 0
NEGVOCALIZATION: 0
BODYLANGUAGE: 0
FACIALEXPRESSION: 0
NEGVOCALIZATION: 0
BREATHING: 0
FACIALEXPRESSION: 0
NEGVOCALIZATION: 0
TOTALSCORE: 0
NEGVOCALIZATION: 0
FACIALEXPRESSION: 0
NEGVOCALIZATION: 0
BODYLANGUAGE: 0
NEGVOCALIZATION: 0
BREATHING: 0
BODYLANGUAGE: 0
FACIALEXPRESSION: 0
CONSOLABILITY: 0
CONSOLABILITY: 0
TOTALSCORE: 0
BODYLANGUAGE: 0
BODYLANGUAGE: 0
CONSOLABILITY: 0
FACIALEXPRESSION: 0
BREATHING: 0
FACIALEXPRESSION: 0
FACIALEXPRESSION: 0
BREATHING: 0
FACIALEXPRESSION: 0
CONSOLABILITY: 0

## 2020-05-08 ASSESSMENT — PAIN SCALES - GENERAL
PAINLEVEL_OUTOF10: 0

## 2020-05-08 ASSESSMENT — PULMONARY FUNCTION TESTS
PIF_VALUE: 0

## 2020-05-08 NOTE — CARE COORDINATION
CM updated by resident this AM, patient is ready for discharge today if pre-cert is obtained. CM asked resident to please update dana Almaraz of patient being ready for discharge and would leave today if pre-cert was obtained. CM called and left voicemail for Imelda Hose in admissions requesting update on status of pre-cert. Awaiting return return call, CM will update nursing, MD and dana, once pre-cert obtained. Thank you,  Marcelle Brush RN,   4th Floor Progressive Care Unit  [phone]      12:16 PM  CM updated by admissions, patient is okay to admit for SNF admission when medically stable. CM spoke with CM director and was informed that during AM rounds, patient would need to have tube feeds to goal prior to discharge. Upon speaking with bedside nurse, tube feeds to be started today between 12 and 12:30 at 25 and then increased throughout the day to goal. Unlikely to be at goal today and patient would need to tolerate tube feeds at goal prior to discharge. CM sent perfect serve to MD, patient able to admit to [address] [place] [address] over the weekend if tube feeds to goal and tolerating. Imelda Hose in admissions ([phone])  Report: [phone] (1st floor nurse)  Fax: [phone]    CM has completed HENS online through secure website for SNF admission at [address] [place] [address]. HENS document ID #: [id number]    CM will have to arrange transport through [address] [place] [address] for medical transport to SNF ([phone]) for discharge. Unable to directly schedule through ambulance company directly.     Thank you,  Marcelle Brush RN,   4th Floor [facility] Care Unit  [phone]

## 2020-05-08 NOTE — PROGRESS NOTES
Progress Note    Admit Date: 4/28/2020  Day: 10  Diet: DIET TUBE FEED CONTINUOUS/CYCLIC NPO; STANDARD WITH FIBER (Jevity 1.2); Gastrostomy; Continuous; 20; 70; 24    CC: AMS    Interval history: Pt underwent PEG tube placement yesterday. No acute events overnight. Pt seen and examined at bedside this morning. He is significantly more awake this morning. Pupils are reactive and he does somewhat track but does not follow commands. He continues to withdraw to pain. Does not verbalize. Vitals: Afebrile this AM (Last febrile 100.6 5/5 PM), BP 120s/90s, HR 60s, 96% RA  Labs: WBCs 5.8 (<5.7<7.6<8.8<11.2<9.2<7.8), Hgb 11.7 (<11.6<11.5<12.4<12.9<13.4<14.1), Na 142, K 3.2, Cr 0.7  CXR (5/3, 5/4): No abnormalities  UA: Positive nitrites, 3+ bacteria  COVID-19: Negative x2  Blood Cx (4/29, 5/4): NGTD  Resp Cx: MRSA    HPI: 49 yo M with PMH bipolar disorder, depression and ADHD presented to [facility] with AMS and multiple falls. UDS with amphetamines, benzos and marijuana. LP yielded minimal fluid for analysis and MRI showed acute left parietal infarct, however neuro did not think this was the cause of his encephalopathy. He was transferred to [facility] for cvEEG given the concern for status epilepticus.       Medications:     Scheduled Meds:   potassium chloride  10 mEq Intravenous Q1H    mupirocin   Nasal BID    vancomycin  1,250 mg Intravenous Q8H    atorvastatin  40 mg Per NG tube Daily    CENTRUM/CERTA-SUSAN with minerals oral  15 mL Per NG tube Daily    folic acid  1 mg Per NG tube Daily    aspirin  81 mg Per NG tube Daily    thiamine  100 mg Per NG tube Daily    pantoprazole  40 mg Intravenous Daily    enoxaparin  40 mg Subcutaneous Daily    lisinopril  10 mg Per NG tube Daily    valproate sodium (DEPACON) IVPB  500 mg Intravenous Q8H    [Held by provider] tamsulosin  0.4 mg Oral Nightly    sodium chloride flush  10 mL Intravenous 2 times per day     Continuous Infusions:    PRN Meds:QUEtiapine, polyethylene glycol, promethazine **OR** ondansetron, perflutren lipid microspheres, sodium chloride flush, acetaminophen **OR** acetaminophen    Objective:   Vitals:   T-max:  Patient Vitals for the past 8 hrs:   BP Temp Temp src Pulse Resp SpO2   05/08/20 0430 (!) 129/91 99.8 °F (37.7 °C) Axillary 62 16 96 %       Intake/Output Summary (Last 24 hours) at 5/8/2020 1106  Last data filed at 5/8/2020 0606  Gross per 24 hour   Intake 1622 ml   Output 1525 ml   Net 97 ml       Review of Systems   Unable to perform ROS: Mental status change     Physical Exam  Vitals signs and nursing note reviewed. Constitutional:       General: He is not in acute distress. Appearance: He is not diaphoretic. Comments: Awake   HENT:      Head: Normocephalic and atraumatic. Nose: Nose normal. No rhinorrhea. Mouth/Throat:      Mouth: Mucous membranes are moist.      Pharynx: No posterior oropharyngeal erythema. Eyes:      General: No scleral icterus. Conjunctiva/sclera: Conjunctivae normal.      Pupils: Pupils are equal, round, and reactive to light. Comments: Does somewhat track   Neck:      Musculoskeletal: Normal range of motion and neck supple. Cardiovascular:      Rate and Rhythm: Normal rate and regular rhythm. Heart sounds: No friction rub. No gallop. Pulmonary:      Effort: Pulmonary effort is normal. No respiratory distress. Breath sounds: No stridor. No rhonchi. Chest:      Chest wall: No tenderness. Abdominal:      General: Bowel sounds are normal. There is no distension. Palpations: Abdomen is soft. Tenderness: There is no abdominal tenderness. Musculoskeletal:         General: No tenderness. Right lower leg: No edema. Left lower leg: No edema. Skin:     General: Skin is warm and dry. Coloration: Skin is not jaundiced or pale. Neurological:      Comments: Awake. Does not verbalize or follow commands at this time. Withdraws to pain.    Psychiatric:      Comments: Unable to assess         LABS:    CBC:   Recent Labs     05/06/20  0429 05/07/20  0455 05/08/20  0538   WBC 7.6 5.7 5.8   HGB 11.5* 11.6* 11.7*   HCT 33.8* 33.2* 34.5*    287 334   MCV 92.4 91.7 92.2     Renal:    Recent Labs     05/06/20  0429 05/07/20  0455 05/08/20  0538    142 142   K 3.7 3.6 3.2*    105 104   CO2 24 24 23   BUN 19 15 12   CREATININE 0.7* 0.7* 0.7*   GLUCOSE 91 90 97   CALCIUM 8.9 8.8 8.8   MG  --   --  2.00   ANIONGAP 13 13 15     Hepatic:   No results for input(s): AST, ALT, BILITOT, BILIDIR, PROT, LABALBU, ALKPHOS in the last 72 hours. Troponin: No results for input(s): TROPONINI in the last 72 hours. BNP: No results for input(s): BNP in the last 72 hours. Lipids: No results for input(s): CHOL, HDL in the last 72 hours. Invalid input(s): LDLCALCU, TRIGLYCERIDE  ABGs:    No results for input(s): PHART, MGW8ORB, PO2ART, CNR2NXS, BEART, THGBART, G4HEMZXA, VWU9RHT in the last 72 hours. INR:   Recent Labs     05/06/20  1254   INR 1.15*     Lactate: No results for input(s): LACTATE in the last 72 hours. Cultures:  -----------------------------------------------------------------  RAD:   XR CHEST PORTABLE   Final Result      Mild linear atelectasis or scarring in the right lung base associated with elevation of the right hemidiaphragm. Lungs are otherwise clear. Focal prominence of the descending thoracic aorta, aneurysm versus tortuosity, is again seen without change. Normal heart size. XR CHEST PORTABLE   Final Result   1. No findings for acute cardiopulmonary disease. XR CHEST PORTABLE   Final Result      1. No acute process or consolidation   2. NG tube remains in place               XR CHEST PORTABLE   Final Result   Impression: Enteric tube terminates within the mid gastric body. Possible trace left effusion. XR CHEST PORTABLE   Final Result      No acute pulmonary disease.          IR LUMBAR PUNCTURE FOR DIAGNOSIS   Final Result 5/5)  -Vancomycin (start 5/5)    HTN  -Resumed lisinopril 10 mg daily     Agitation  -PRN seroquel 25 mg BID, pt has not required  -Ativan discontinued    Moderate malnutrition  - Dietary on board: leslie recs  - PEG tube placed (5/7/20), will resume TFs as able    Dispo  -Poor prognosis per neuro, requires placement  -PT (6/24) / OT (6/24)  -CW following: looking into [place] vs [facility]  -Nutrition: discussed PEG tube placement at length with dana/SETH Toney (5/5/20) and she provided verbal consent to proceed, PEG tube placement (5/7)    Code Status: Full code  FEN: Dietitian consulted, TFs paused, potential plan for PEG 5/7/20    PPX: Lovenox  DISPO: GMF, SNF on discharge    Sade Restrepo MD, PGY-1  05/08/20  11:06 AM    This patient will be staffed and discussed with attending physician Michelle Morris MD.

## 2020-05-08 NOTE — DISCHARGE INSTR - COC
Time of Hospital Discharge:   Respiratory Treatments: None  Oxygen Therapy:  is not on home oxygen therapy. Ventilator:    - No ventilator support    Rehab Therapies: Physical Therapy, Occupational Therapy and Speech/Language Therapy  Weight Bearing Status/Restrictions: No weight bearing restirctions  Other Medical Equipment (for information only, NOT a DME order):  None  Other Treatments: None    Patient's personal belongings (please select all that are sent with patient):  None    RN SIGNATURE:  Electronically signed by Boy Medellin RN on 5/9/20 at 12:25 PM EDT    CASE MANAGEMENT/SOCIAL WORK SECTION    Inpatient Status Date: 04/28/2020    Readmission Risk Assessment Score:  Readmission Risk              Risk of Unplanned Readmission:        32           Discharging to Facility/ Agency   · Name: [facility]  · [address] [facility] for the 1st floor nurse  · Phone:report- [phone]  · Fax:[phone]    Dialysis Facility (if applicable)   · Name:  · Address:  · Dialysis Schedule:  · Phone:  · Fax:    / signature: Electronically signed by Zan Alcantar RN on 5/9/20 at 10:38 AM EDT    PHYSICIAN SECTION    Prognosis: Poor    Condition at Discharge: Stable    Rehab Potential (if transferring to Rehab): Guarded    Recommended Labs or Other Treatments After Discharge: CBC in one week, linezolid x5 days, continue valproic acid and follow up with neurology in 3 months    Physician Certification: I certify the above information and transfer of Yonas D Frankie Franco  is necessary for the continuing treatment of the diagnosis listed and that he requires [facility] for greater 30 days.      Update Admission H&P: No change in H&P    PHYSICIAN SIGNATURE:  Electronically signed by Rabia Pittman / Estrellita Fregoso MD on 5/8/20 at 11:33 AM EDT

## 2020-05-08 NOTE — CONSULTS
Clinical Pharmacy Consult Note    Admit date: 4/28/2020    Interval update: Tmax 99.8 - afebrile. Vancomycin dose delayed yesterday due to PEG tube placement. Subjective/Objective:  49 yo M admitted with Metabolic encephalopathy 2/2 methamphetamine use. PMH with ADHD, bipolar disorder, depression, ADHD. Patient initially presented with AMS and multiple falls to [facility], then trasnferred to [facility] for neurology work up. UDS from [facility] positive for amphetamines, benzos and marijuana. During the hospital course, patient became febrile on TF and WBC trended. Subsequently respiratory and blood culture collected. Today Respiratory culture came back positive for MRSA. Pharmacy is consulted to dose Vancomycin per Dr. Shelia Bellamy    Pertinent Medications:  Ceftriaxone 1g IV q24h -- day #4  Vancomycin Pharmacy to dose -- day # 4   2500mg IV once   1250mg IV q8h    PERTINENT LABS:  Recent Labs     05/07/20  0455 05/08/20  0538    142   K 3.6 3.2*    104   CO2 24 23   BUN 15 12   CREATININE 0.7* 0.7*       Estimated Creatinine Clearance: 150 mL/min (A) (based on SCr of 0.7 mg/dL (L)). Recent Labs     05/07/20  0455 05/08/20  0538   WBC 5.7 5.8   HGB 11.6* 11.7*   HCT 33.2* 34.5*   MCV 91.7 92.2    334       Height:  6' (182.9 cm)  Weight: 215 lb 13.3 oz (97.9 kg)    Micro:  Date Site Micro Susceptibility   4/29 CSF Not detected    4/29 Blood NGTD    5/4 Respiratory MRSA    5/4 Blood NGTD      Vanc level:  Date/time Type Level (mcg/mL)    5/6 1230 Trough  16.0     5/8 0830 Trough 16.3 Dose delayed + collected 1 hr late due to schedule conflict     Assessment/Plan:  1. Aspiration Pneumonia vs. HAP:  vancomycin day #4  Vancomycin  · Trough before 9th dose today =16.3 mcg/mL  · Vancmycin dose delayed ~3hr yesterday due to PEG tube placement. Vanc trough collected 1hr late today due to unadjusted timed trough based on rescheduled vanc dose. · Received two q8h doses prior to this trough.  Patient's true through might be closer to 18-20 mcg/mL - however, the dose scheduled for 0800 this AM has not been hung yet - likely being delayed. · Continue 1250mg IV q8h for now as ID recommends short course of vanc and changing to po linezolid at discharge. Provides ~ 13 mg/kg and is based on a half-life elimination of 5 hours. · If vancomycin continues and patient stays over the weekend, will get another trough on weekend. · Renal function will be monitored closely and dosing will be adjusted as appropriate. Thank you for consulting pharmacy. Please call with any questions.     Vandana Bertrand, PharmD  PGY1 Pharmacy Resident  Wireless: [phone] ([id number])  5/8/2020 9:03 AM

## 2020-05-09 VITALS
OXYGEN SATURATION: 100 % | TEMPERATURE: 97.9 F | SYSTOLIC BLOOD PRESSURE: 144 MMHG | WEIGHT: 215.83 LBS | DIASTOLIC BLOOD PRESSURE: 81 MMHG | HEART RATE: 63 BPM | RESPIRATION RATE: 18 BRPM | HEIGHT: 72 IN | BODY MASS INDEX: 29.23 KG/M2

## 2020-05-09 LAB
ANION GAP SERPL CALCULATED.3IONS-SCNC: 12 MMOL/L (ref 3–16)
BUN BLDV-MCNC: 10 MG/DL (ref 7–20)
CALCIUM SERPL-MCNC: 8.7 MG/DL (ref 8.3–10.6)
CHLORIDE BLD-SCNC: 103 MMOL/L (ref 99–110)
CO2: 25 MMOL/L (ref 21–32)
CREAT SERPL-MCNC: 0.7 MG/DL (ref 0.9–1.3)
GFR AFRICAN AMERICAN: >60
GFR NON-AFRICAN AMERICAN: >60
GLUCOSE BLD-MCNC: 101 MG/DL (ref 70–99)
GLUCOSE BLD-MCNC: 128 MG/DL (ref 70–99)
GLUCOSE BLD-MCNC: 129 MG/DL (ref 70–99)
HCT VFR BLD CALC: 33 % (ref 40.5–52.5)
HEMOGLOBIN: 11.5 G/DL (ref 13.5–17.5)
MCH RBC QN AUTO: 31.7 PG (ref 26–34)
MCHC RBC AUTO-ENTMCNC: 35 G/DL (ref 31–36)
MCV RBC AUTO: 90.8 FL (ref 80–100)
PDW BLD-RTO: 14.5 % (ref 12.4–15.4)
PERFORMED ON: ABNORMAL
PERFORMED ON: ABNORMAL
PLATELET # BLD: 316 K/UL (ref 135–450)
PMV BLD AUTO: 8.7 FL (ref 5–10.5)
POTASSIUM REFLEX MAGNESIUM: 5 MMOL/L (ref 3.5–5.1)
RBC # BLD: 3.63 M/UL (ref 4.2–5.9)
SODIUM BLD-SCNC: 140 MMOL/L (ref 136–145)
WBC # BLD: 5.5 K/UL (ref 4–11)

## 2020-05-09 PROCEDURE — 80048 BASIC METABOLIC PNL TOTAL CA: CPT

## 2020-05-09 PROCEDURE — 6370000000 HC RX 637 (ALT 250 FOR IP): Performed by: INTERNAL MEDICINE

## 2020-05-09 PROCEDURE — 6360000002 HC RX W HCPCS: Performed by: INTERNAL MEDICINE

## 2020-05-09 PROCEDURE — 85027 COMPLETE CBC AUTOMATED: CPT

## 2020-05-09 PROCEDURE — 2580000003 HC RX 258: Performed by: INTERNAL MEDICINE

## 2020-05-09 PROCEDURE — 2500000003 HC RX 250 WO HCPCS: Performed by: INTERNAL MEDICINE

## 2020-05-09 PROCEDURE — C9113 INJ PANTOPRAZOLE SODIUM, VIA: HCPCS | Performed by: INTERNAL MEDICINE

## 2020-05-09 RX ORDER — ATORVASTATIN CALCIUM 40 MG/1
40 TABLET, FILM COATED ORAL DAILY
Qty: 30 TABLET | Refills: 0 | Status: SHIPPED | OUTPATIENT
Start: 2020-05-09 | End: 2020-07-22 | Stop reason: SDUPTHER

## 2020-05-09 RX ORDER — LANSOPRAZOLE
30 KIT
Status: DISCONTINUED | OUTPATIENT
Start: 2020-05-10 | End: 2020-05-09 | Stop reason: HOSPADM

## 2020-05-09 RX ORDER — LINEZOLID 600 MG/1
600 TABLET, FILM COATED ORAL 2 TIMES DAILY
Qty: 10 TABLET | Refills: 0 | Status: SHIPPED | OUTPATIENT
Start: 2020-05-09 | End: 2020-05-14

## 2020-05-09 RX ADMIN — MULTIVITAMIN 15 ML: LIQUID ORAL at 08:22

## 2020-05-09 RX ADMIN — ATORVASTATIN CALCIUM 40 MG: 40 TABLET, FILM COATED ORAL at 08:23

## 2020-05-09 RX ADMIN — ENOXAPARIN SODIUM 40 MG: 40 INJECTION SUBCUTANEOUS at 08:22

## 2020-05-09 RX ADMIN — VANCOMYCIN HYDROCHLORIDE 1250 MG: 10 INJECTION, POWDER, LYOPHILIZED, FOR SOLUTION INTRAVENOUS at 10:06

## 2020-05-09 RX ADMIN — PANTOPRAZOLE SODIUM 40 MG: 40 INJECTION, POWDER, FOR SOLUTION INTRAVENOUS at 05:20

## 2020-05-09 RX ADMIN — VANCOMYCIN HYDROCHLORIDE 1250 MG: 10 INJECTION, POWDER, LYOPHILIZED, FOR SOLUTION INTRAVENOUS at 02:35

## 2020-05-09 RX ADMIN — FOLIC ACID 1 MG: 1 TABLET ORAL at 08:23

## 2020-05-09 RX ADMIN — LISINOPRIL 10 MG: 10 TABLET ORAL at 08:23

## 2020-05-09 RX ADMIN — VALPROATE SODIUM 500 MG: 100 INJECTION, SOLUTION INTRAVENOUS at 01:14

## 2020-05-09 RX ADMIN — Medication 100 MG: at 08:23

## 2020-05-09 RX ADMIN — VALPROATE SODIUM 500 MG: 100 INJECTION, SOLUTION INTRAVENOUS at 08:22

## 2020-05-09 RX ADMIN — ASPIRIN 81 MG 81 MG: 81 TABLET ORAL at 08:23

## 2020-05-09 RX ADMIN — Medication 10 ML: at 08:22

## 2020-05-09 ASSESSMENT — PAIN SCALES - PAIN ASSESSMENT IN ADVANCED DEMENTIA (PAINAD)
BODYLANGUAGE: 0
BREATHING: 0
TOTALSCORE: 0
CONSOLABILITY: 0
BREATHING: 0
FACIALEXPRESSION: 0
BODYLANGUAGE: 0
CONSOLABILITY: 0
NEGVOCALIZATION: 0
TOTALSCORE: 0
FACIALEXPRESSION: 0
NEGVOCALIZATION: 0

## 2020-05-09 ASSESSMENT — PAIN SCALES - GENERAL: PAINLEVEL_OUTOF10: 0

## 2020-05-09 NOTE — PROGRESS NOTES
Progress Note    Admit Date: 4/28/2020  Day: 11  Diet: DIET TUBE FEED CONTINUOUS/CYCLIC NPO; STANDARD WITH FIBER (Jevity 1.2); Gastrostomy; Continuous; 20; 70; 24    CC: AMS    Interval history: Pt underwent PEG tube placement yesterday. No acute events overnight. Pt seen and examined at bedside this morning. He is sleeping however easily arousable. Pupils are reactive and he does somewhat track but does not follow commands. He continues to withdraw to pain. Does not verbalize. Vitals: Afebrile this AM (Last febrile 100.6 5/5 PM), BP 120s-140s/70s-80s, HR 60s, 100% RA  Labs: WBCs 5.5, Hgb 11.5 (<11.7<11.6<11.5<12.4<12.9<13.4<14.1), Na 140, K 5, Cr 0.7  CXR (5/3, 5/4): No abnormalities  UA: Positive nitrites, 3+ bacteria  COVID-19: Negative x2  Blood Cx (4/29, 5/4): NGTD  Resp Cx: MRSA    HPI: 47 yo M with PMH bipolar disorder, depression and ADHD presented to [facility] with AMS and multiple falls. UDS with amphetamines, benzos and marijuana. LP yielded minimal fluid for analysis and MRI showed acute left parietal infarct, however neuro did not think this was the cause of his encephalopathy. He was transferred to [facility] for cvEEG given the concern for status epilepticus.       Medications:     Scheduled Meds:   mupirocin   Nasal BID    vancomycin  1,250 mg Intravenous Q8H    atorvastatin  40 mg Per NG tube Daily    CENTRUM/CERTA-SUSAN with minerals oral  15 mL Per NG tube Daily    folic acid  1 mg Per NG tube Daily    aspirin  81 mg Per NG tube Daily    thiamine  100 mg Per NG tube Daily    pantoprazole  40 mg Intravenous Daily    enoxaparin  40 mg Subcutaneous Daily    lisinopril  10 mg Per NG tube Daily    valproate sodium (DEPACON) IVPB  500 mg Intravenous Q8H    [Held by provider] tamsulosin  0.4 mg Oral Nightly    sodium chloride flush  10 mL Intravenous 2 times per day     Continuous Infusions:    PRN Meds:QUEtiapine, polyethylene glycol, promethazine **OR** ondansetron, perflutren lipid microspheres, sodium chloride flush, acetaminophen **OR** acetaminophen    Objective:   Vitals:   T-max:  Patient Vitals for the past 8 hrs:   BP Temp Temp src Pulse Resp SpO2   05/09/20 0754 (!) 144/81 97.9 °F (36.6 °C) Axillary 63 18 100 %   05/09/20 0409 129/78 99.5 °F (37.5 °C) Axillary 60 18 100 %       Intake/Output Summary (Last 24 hours) at 5/9/2020 5752  Last data filed at 5/8/2020 2341  Gross per 24 hour   Intake 196 ml   Output 1125 ml   Net -929 ml       Review of Systems   Unable to perform ROS: Mental status change     Physical Exam  Vitals signs and nursing note reviewed. Constitutional:       General: He is not in acute distress. Appearance: He is not diaphoretic. Comments: Awake   HENT:      Head: Normocephalic and atraumatic. Nose: Nose normal. No rhinorrhea. Mouth/Throat:      Mouth: Mucous membranes are moist.      Pharynx: No posterior oropharyngeal erythema. Eyes:      General: No scleral icterus. Conjunctiva/sclera: Conjunctivae normal.      Pupils: Pupils are equal, round, and reactive to light. Comments: Does somewhat track   Neck:      Musculoskeletal: Normal range of motion and neck supple. Cardiovascular:      Rate and Rhythm: Normal rate and regular rhythm. Heart sounds: No friction rub. No gallop. Pulmonary:      Effort: Pulmonary effort is normal. No respiratory distress. Breath sounds: No stridor. No rhonchi. Chest:      Chest wall: No tenderness. Abdominal:      General: Bowel sounds are normal. There is no distension. Palpations: Abdomen is soft. Tenderness: There is no abdominal tenderness. Musculoskeletal:         General: No tenderness. Right lower leg: No edema. Left lower leg: No edema. Skin:     General: Skin is warm and dry. Coloration: Skin is not jaundiced or pale. Neurological:      Comments: Awake. Does not verbalize or follow commands at this time. Withdraws to pain.    Psychiatric: cultures NGTD.   -Rocephin (start 5/5)  -Vancomycin (start 5/5)  - ID on board: linezolid x5 days on discharge    HTN  -Resumed lisinopril 10 mg daily     Agitation  -PRN seroquel 25 mg BID, pt has not required  -Ativan discontinued    Moderate malnutrition  - Dietary on board: appreciate recs  - PEG tube placed (5/7/20), will resume TFs as able    Dispo  -Poor prognosis per neuro, requires placement  -PT (6/24) / OT (6/24)  -CW following: SNF  -Nutrition: discussed PEG tube placement at length with dana/SETH Mancera (5/5/20) and she provided verbal consent to proceed, PEG tube placement (5/7)    Code Status: Full code  FEN: Dietitian consulted, TFs resumed through PEG tube (placed 5/7/20)   PPX: Lovenox  DISPO: GMF, SNF on discharge    Carmina Doherty MD, PGY-1  05/09/20  8:22 AM    This patient will be staffed and discussed with attending physician Ovidio Ahuja MD.

## 2020-05-09 NOTE — CONSULTS
continues and patient stays another day, will get another trough tomorrow 5/10. · Renal function will be monitored closely and dosing will be adjusted as appropriate. Thank you for consulting pharmacy. Please call with any questions.     Najma Villa, PharmD  PGY1 Pharmacy Resident  Wireless: [phone] ([id number])  5/9/2020 7:40 AM

## 2020-05-09 NOTE — PROGRESS NOTES
Called [phone] to give report but  hung-up line.        Electronically signed by Joselito Meier RN on 5/9/20 at 1:59 PM EDT

## 2020-05-09 NOTE — PROGRESS NOTES
[facility], questions answered.   Electronically signed by Jonel Pitts RN on 5/9/2020 at 4:45 PM

## 2020-05-09 NOTE — PROGRESS NOTES
Pharmacy Progress Note    Pantoprazole 40 mg IV daily ordered for GI prophylaxis. As patient is tolerating other oral medications and Hg/Hct is stable, will change to lansoprazole 30 mg via PEG tube daily per IV to PO policy. Patient Selection for IV to PO Conversion   A. Able to tolerate oral / NG medications, diet, or tube feeding   B. Exhibits signs of clinical improvement specific to the drug therapy to be switched   C. Professional judgement of the pharmacist indicates that PO medications are appropriate for the patient   D. Exclusion Criteria  NPO  Not tolerating feeding (e.g., nausea, vomiting, diarrhea, large residuals on tube feeding)  Continuous NG suctioning  Active GI bleed       Please call pharmacy with any questions.   Ke Stewart PharmD, [facility]  Wireless: [phone]  5/9/2020 11:42 AM

## 2020-05-09 NOTE — PROGRESS NOTES
Juliocesar cortez, held a phone to his ear so she could talk to him for several minutes. Pt did try speaking but was inaudible.

## 2020-05-09 NOTE — DISCHARGE SUMMARY
nonspecific and could represent  acute toxic/metabolic leukoencephalopathy. Hypoxic ischemic encephalopathy is considered less likely    given the predominant white matter findings. 4. Symmetric foci of hyperintense T2/flair signal foci within the globus pallidus, suggestive of carbon monoxide poisoning. This is unchanged from the prior study. Given the lack of diffusion restriction, this is probably subacute to chronic. FL LUMBAR PUNCTURE DIAG   Final Result   Impression: Extensive fluoroscopically guided lumbar puncture was unsuccessful secondary to patient condition, altered mental status and constant movement. The patient was aborted due to safety concerns. The procedure can be attempted under anesthesia if    clinically appropriate.              Consults:     IP CONSULT TO NEUROLOGY  IP CONSULT TO INTERVENTIONAL RADIOLOGY  IP CONSULT TO SOCIAL WORK  IP CONSULT TO DIETITIAN  PHARMACY TO DOSE VANCOMYCIN  IP CONSULT TO GI  IP CONSULT TO INFECTIOUS DISEASES  PHARMACY TO DOSE VANCOMYCIN      Discharge Instructions/Follow-up:  PCP, neurology    Activity: activity as tolerated    Diet: TFs, regular diet    Discharge Medications:     Discharge Medication List as of 5/9/2020  1:32 PM           Details   valproate (DEPAKENE) 250 MG/5ML solution 10 mLs by Per G Tube route 3 times daily, Disp-1 Bottle, R-1Normal              Details   linezolid (ZYVOX) 600 MG tablet Take 1 tablet by mouth 2 times daily for 5 days, Disp-10 tablet, R-0Normal      atorvastatin (LIPITOR) 40 MG tablet 1 tablet by PEG Tube route daily, Disp-30 tablet, R-0Normal      lisinopril (PRINIVIL;ZESTRIL) 10 MG tablet 1 tablet by PEG Tube route daily, Disp-30 tablet, R-0Normal      QUEtiapine (SEROQUEL) 25 MG tablet 1 tablet by PEG Tube route 2 times daily as needed for Agitation, Disp-60 tablet, R-0Normal      polyethylene glycol (GLYCOLAX) 17 g packet 17 g by Per G Tube route daily as needed for Constipation, Disp-527 g, R-1Normal      famotidine

## 2020-05-20 PROBLEM — R79.89 ELEVATED TROPONIN: Status: RESOLVED | Noted: 2020-04-20 | Resolved: 2020-05-20

## 2020-05-20 PROBLEM — R77.8 ELEVATED TROPONIN: Status: RESOLVED | Noted: 2020-04-20 | Resolved: 2020-05-20

## 2020-05-26 LAB — REPORT: NORMAL

## 2020-06-17 ENCOUNTER — HOSPITAL ENCOUNTER (OUTPATIENT)
Dept: SPEECH THERAPY | Age: 55
Setting detail: THERAPIES SERIES
Discharge: HOME | End: 2020-06-17
Payer: COMMERCIAL

## 2020-06-17 ENCOUNTER — HOSPITAL ENCOUNTER (OUTPATIENT)
Dept: GENERAL RADIOLOGY | Age: 55
Discharge: HOME | End: 2020-06-17
Payer: COMMERCIAL

## 2020-06-17 PROCEDURE — 74230 X-RAY XM SWLNG FUNCJ C+: CPT

## 2020-06-17 PROCEDURE — 92611 MOTION FLUOROSCOPY/SWALLOW: CPT

## 2020-07-22 ENCOUNTER — TELEPHONE (OUTPATIENT)
Dept: FAMILY MEDICINE CLINIC | Age: 55
End: 2020-07-22

## 2020-07-22 ENCOUNTER — VIRTUAL VISIT (OUTPATIENT)
Dept: FAMILY MEDICINE CLINIC | Age: 55
End: 2020-07-22
Payer: COMMERCIAL

## 2020-07-22 PROCEDURE — 99203 OFFICE O/P NEW LOW 30 MIN: CPT | Performed by: FAMILY MEDICINE

## 2020-07-22 RX ORDER — OMEPRAZOLE 20 MG/1
CAPSULE, DELAYED RELEASE ORAL
COMMUNITY
Start: 2020-07-20

## 2020-07-22 RX ORDER — BLOOD PRESSURE TEST KIT
KIT MISCELLANEOUS
Qty: 1 KIT | Refills: 0 | Status: SHIPPED | OUTPATIENT
Start: 2020-07-22

## 2020-07-22 RX ORDER — BUPROPION HYDROCHLORIDE 150 MG/1
TABLET, EXTENDED RELEASE ORAL
COMMUNITY
Start: 2020-06-28 | End: 2020-07-22 | Stop reason: ALTCHOICE

## 2020-07-22 RX ORDER — BUPROPION HYDROCHLORIDE 300 MG/1
TABLET ORAL
COMMUNITY
Start: 2020-07-20

## 2020-07-22 RX ORDER — CLONAZEPAM 0.5 MG/1
TABLET ORAL
COMMUNITY
Start: 2020-07-07 | End: 2022-03-22

## 2020-07-22 RX ORDER — ONDANSETRON 4 MG/1
4 TABLET, ORALLY DISINTEGRATING ORAL EVERY 8 HOURS PRN
COMMUNITY
Start: 2020-05-26 | End: 2022-07-12 | Stop reason: SDUPTHER

## 2020-07-22 RX ORDER — POLYETHYLENE GLYCOL 3350 17 G/17G
POWDER, FOR SOLUTION ORAL
COMMUNITY
Start: 2020-07-21 | End: 2022-03-22

## 2020-07-22 RX ORDER — GABAPENTIN 100 MG/1
CAPSULE ORAL
COMMUNITY
Start: 2020-07-20 | End: 2022-03-22

## 2020-07-22 RX ORDER — AMLODIPINE BESYLATE 5 MG/1
5 TABLET ORAL NIGHTLY
Qty: 30 TABLET | Refills: 1 | Status: SHIPPED | OUTPATIENT
Start: 2020-07-22 | End: 2020-09-24

## 2020-07-22 RX ORDER — POTASSIUM CHLORIDE 1500 MG/1
TABLET, EXTENDED RELEASE ORAL
COMMUNITY
Start: 2020-07-21

## 2020-07-22 RX ORDER — MEMANTINE HYDROCHLORIDE 5 MG/1
TABLET ORAL
COMMUNITY
Start: 2020-07-20 | End: 2021-11-08 | Stop reason: SDUPTHER

## 2020-07-22 RX ORDER — DOXYCYCLINE 100 MG/1
CAPSULE ORAL
COMMUNITY
Start: 2020-07-13 | End: 2020-07-22 | Stop reason: ALTCHOICE

## 2020-07-22 RX ORDER — TRAZODONE HYDROCHLORIDE 50 MG/1
TABLET ORAL
COMMUNITY
Start: 2020-07-20 | End: 2021-11-08 | Stop reason: SDUPTHER

## 2020-07-22 RX ORDER — ATORVASTATIN CALCIUM 80 MG/1
80 TABLET, FILM COATED ORAL NIGHTLY
Qty: 90 TABLET | Refills: 3 | Status: SHIPPED | OUTPATIENT
Start: 2020-07-22

## 2020-07-22 RX ORDER — CARBAMAZEPINE 100 MG/1
TABLET, CHEWABLE ORAL
COMMUNITY
Start: 2020-07-01 | End: 2022-03-22

## 2020-07-22 RX ORDER — TESTOSTERONE GEL, 1% 10 MG/G
GEL TRANSDERMAL
COMMUNITY
Start: 2020-07-21

## 2020-07-22 RX ORDER — ACETAMINOPHEN 500 MG
TABLET ORAL
COMMUNITY
Start: 2020-07-20

## 2020-07-22 RX ORDER — CIPROFLOXACIN HYDROCHLORIDE 3.5 MG/ML
SOLUTION/ DROPS TOPICAL
COMMUNITY
Start: 2020-06-07 | End: 2022-03-22

## 2020-07-22 ASSESSMENT — ENCOUNTER SYMPTOMS
ABDOMINAL PAIN: 0
RHINORRHEA: 0
BACK PAIN: 0
SHORTNESS OF BREATH: 0
SINUS PRESSURE: 0
ABDOMINAL DISTENTION: 0
NAUSEA: 0
BLOOD IN STOOL: 0
VOMITING: 0
CHEST TIGHTNESS: 0
DIARRHEA: 0
WHEEZING: 0
CONSTIPATION: 0
COUGH: 0
TROUBLE SWALLOWING: 0

## 2020-07-22 NOTE — PROGRESS NOTES
Oral D Fernie Wong   48 y.o. male   1965    HPI:  Chief Complaint   Patient presents with   Trine Lesch New Doctor    Other     Pt had a stroke in April of '20. Just got out of rehab. Virtual visit encounter type:  [x] Doxy. me  [] Telephone w/o video  [] MyChart  [] Other: This is patient's first visit with me to establish care as their new PCP. He was placed on my schedule about an hour prior to his actual appointment time as a new patient. He was accompanied by his significant other, who stated she's his \"power of \". She did not disclose why. This person mainly spoke for most of the visit rather than patient himself. According to chart review, he was originally seen at [facility] and then transferred to [facility] on 4/28/2020. He was discharged on 5/9/2020. Most notably, he had altered mental status likely related to polysubstance abuse as noted by UDS being positive for amephetamines, benzodiazepines, and cannabis. He had MRI brain, which showed acute left parietal infarct. Patient did report of having a stroke to me when asked what the possible cause was, he and his PoA negelected to mention about any hx of polysubstance abuse. Patient was started on Valproic acid for concern for status epilepticus and was advised to follow up with neurology in 3 months. Per his PoA, he was discharged from a nursing home on 7/17/2020. Since his hospital discharge, patient has been seen by PT/OT and has made significant improvement. Has some right-sided weakness, but otherwise speech and mental status is basically back to baseline per PoA. PoA stated that she was advised to get in touch with a PCP for medical supplies, PT/OT, home health, etc.  At some point, patient was on a PEG tube, but that was removed some time ago.     Allergies   Allergen Reactions    Reglan [Metoclopramide]      Lock jaw     Current Outpatient Medications on File Prior to Visit   Medication Sig Dispense Refill    acetaminophen (TYLENOL) 500 MG tablet       b complex-C-E-zinc (STRESS FORMULA W/ ZINC) tablet Take 1 tablet by mouth daily      buPROPion (WELLBUTRIN XL) 300 MG extended release tablet       clonazePAM (KLONOPIN) 0.5 MG tablet       diclofenac sodium (VOLTAREN) 1 % GEL       gabapentin (NEURONTIN) 100 MG capsule       memantine (NAMENDA) 5 MG tablet       omeprazole (PRILOSEC) 20 MG delayed release capsule       ondansetron (ZOFRAN-ODT) 4 MG disintegrating tablet Take 4 mg by mouth every 8 hours as needed      polyethylene glycol (GLYCOLAX) 17 GM/SCOOP powder       testosterone (ANDROGEL; TESTIM) 50 MG/5GM (1%) GEL 1% gel       traZODone (DESYREL) 50 MG tablet       tamsulosin (FLOMAX) 0.4 MG capsule Take 1 capsule by mouth nightly 30 capsule 3    carBAMazepine (TEGRETOL) 100 MG chewable tablet       ciprofloxacin (CILOXAN) 0.3 % ophthalmic solution       KLOR-CON M20 20 MEQ extended release tablet       valproate (DEPAKENE) 250 MG/5ML solution 10 mLs by Per G Tube route 3 times daily (Patient not taking: Reported on 7/22/2020) 1 Bottle 1    famotidine (PEPCID) 20 MG tablet 1 tablet by PEG Tube route 2 times daily (Patient not taking: Reported on 7/22/2020) 60 tablet 0    aspirin 81 MG EC tablet Take 1 tablet by mouth daily (Patient not taking: Reported on 7/22/2020) 30 tablet 3     No current facility-administered medications on file prior to visit. History reviewed. No pertinent family history. Social History     Tobacco Use    Smoking status: Current Every Day Smoker     Packs/day: 0.50     Types: Cigarettes    Smokeless tobacco: Never Used   Substance Use Topics    Alcohol use: Yes     Frequency: 4 or more times a week     Drinks per session: 3 or 4     Comment: Occassionally      Review of Systems   Constitutional: Negative for activity change, appetite change, fatigue, fever and unexpected weight change.    HENT: Negative for congestion, rhinorrhea, sinus pressure and to person, place, and time. Mental status is at baseline. Psychiatric:         Attention and Perception: Attention and perception normal.         Mood and Affect: Mood and affect normal.         Speech: Speech normal.         Behavior: Behavior normal. Behavior is cooperative. Thought Content: Thought content normal.       Assessment/Plan:     Oral was seen today for established new doctor and other. Diagnoses and all orders for this visit:    Encounter to establish care with new doctor  Comments:  Patient and patient's PoA was informed that today's visit was rather limited in the sense given the poor video connection and especially given no time to review his chart as this was a same day appointment and I had other patients to see. I advised his PoA to get in touch with any  with his insurance regarding drafting what the patient is requesting and needs. I reiterated patient to resume Aspirin therapy to decrease the risk of future stroke and even MI. He stated that he stopped due to dyspepsia. History of stroke in adulthood  -     atorvastatin (LIPITOR) 80 MG tablet; 1 tablet by route nightly  -     amLODIPine (NORVASC) 5 MG tablet; Take 1 tablet by mouth nightly  -     Blood Pressure KIT; Check BP 1-2 times daily    Polysubstance abuse (HCC)    Essential hypertension  -     amLODIPine (NORVASC) 5 MG tablet; Take 1 tablet by mouth nightly  -     Blood Pressure KIT;  Check BP 1-2 times daily       Medications Discontinued During This Encounter   Medication Reason    buPROPion (WELLBUTRIN SR) 150 MG extended release tablet Therapy completed    doxycycline monohydrate (MONODOX) 100 MG capsule Therapy completed    QUEtiapine (SEROQUEL) 25 MG tablet Therapy completed    lisinopril (PRINIVIL;ZESTRIL) 10 MG tablet Alternate therapy    atorvastatin (LIPITOR) 40 MG tablet REORDER      Patient was informed that whether starting and/or adding a new medication as well as continuing any current medication(s) that the benefits and risks have to be considered and weighed over. Patient was also informed that there are no medications that has no side effects. The most common adverse effects of medications were addressed at today's visit. Estimated length of time of today's visit: 30 minutes    PDMP Monitoring:   Last PDMP Marcello as Reviewed [facility]):  Review User Review Instant Review Result   [address] CHRISTIAN 7/22/2020  5:27 PM Reviewed PDMP [1]     Follow-up: No follow-ups on file. . Patient was informed that if his or her symptoms worsen to return here or go to nearest ER if symptoms significantly worsen. Disclaimer:  Yonas Salinas is a 48 y.o. male is being evaluated by a virtual visit (video visit) or telephone (without video) encounter to address their concern(s) as mentioned above. Prior to arranging this appointment, the patient was made aware of the need and importance to schedule the visit in this manner given the current ongoing COVID-19 pandemic. After this discussion, the patient acknowledged understanding and agreed to today's virtual visit encounter. A caregiver was present when appropriate. Due to this being a TeleHealth encounter (during the [facility]- public health emergency), evaluation of the following organ systems was overall limited: Vitals/Constitutional/EENT/Resp/CV/GI//MS/Neuro/Skin/Heme-Lymph-Imm. As a result, establishing a diagnosis(s) and formulating a plan of care may be overall more difficult and complicated compared to conventional (face-to-face) office visits.   Pursuant to the emergency declaration under the [address] [place] [address], [address] [place] [address] authority and the [employer] Act, this virtual visit was conducted with the patient's (and/or legal guardian's) consent, to reduce the patient's risk (as well as others) of exposure to COVID-19 and to continue to maintain and provide necessary

## 2020-07-22 NOTE — TELEPHONE ENCOUNTER
Angelica Nicholson calling (did not see on most recent hippa 11/4/19) . States she \"needs to know what to do\" States the pt had a VV today and pt had too many complications. She was informed that Dr. Beto Downing would like for the pt to come to the office to establish and discuss medical conditions. Angelica Nicholson says it is \"very hard to take him anywhere because she is so small and she can't handle him on her own\" he does not have any medical equipment (wheelchair, etc) just a gait belt and she is terrified to try to move him on her own. She is just not sure where to go from here.

## 2020-07-23 NOTE — TELEPHONE ENCOUNTER
We can try another phone visit. Please make if for 330 and block off the rest of the time to work through all the problems.   Dr. Erwin Hugh

## 2020-08-04 NOTE — ED PROVIDER NOTES
on file    Transportation needs:     Medical: Not on file     Non-medical: Not on file   Tobacco Use    Smoking status: Current Every Day Smoker     Packs/day: 1.00     Types: Cigarettes    Smokeless tobacco: Never Used   Substance and Sexual Activity    Alcohol use: Yes     Comment: Occassionally    Drug use: Never    Sexual activity: Not on file   Lifestyle    Physical activity:     Days per week: Not on file     Minutes per session: Not on file    Stress: Not on file   Relationships    Social connections:     Talks on phone: Not on file     Gets together: Not on file     Attends Yarsanism service: Not on file     Active member of club or organization: Not on file     Attends meetings of clubs or organizations: Not on file     Relationship status: Not on file    Intimate partner violence:     Fear of current or ex partner: Not on file     Emotionally abused: Not on file     Physically abused: Not on file     Forced sexual activity: Not on file   Other Topics Concern    Not on file   Social History Narrative    Not on file       REVIEW OF SYSTEMS    Constitutional:  Denies fever, chills, weight loss or weakness   Eyes:  Denies photophobia or discharge   HENT:  Denies sore throat or ear pain   Respiratory:  Denies cough or shortness of breath   Cardiovascular:  Denies chest pain, palpitations or swelling   GI: Right upper quadrant abdominal pain musculoskeletal:  Denies back pain   Skin:  Denies rash   Neurologic:  Denies headache, focal weakness or sensory changes   Endocrine:  Denies polyuria or polydypsia   Lymphatic:  Denies swollen glands   Psychiatric:  Denies depression, suicidal ideation or homicidal ideation   All systems negative except as marked.      PHYSICAL EXAM    VITAL SIGNS: /61   Pulse 102   Temp 98.9 °F (37.2 °C) (Temporal)   Resp 16   Wt 234 lb 12.6 oz (106.5 kg)   SpO2 96%   BMI 31.84 kg/m²    Constitutional:  Well developed, Well nourished, No acute distress, Non-toxic purple   SL hip abduction    Clam ABD Cues form   Hip Ext Denae Fofana      BOSU fwd/side lunge      BOSU squat      Leg Press Iso/Con/Ecc 0-      Cybex HS curl      TKE      Glute side walks 2 2    RDL      Slide Lunge      Slide HS eccentrics      Step ups/ecc step down      Swissball wall rolls- in SLS- hip drive      Quad hip ext/wall-ball rolls      SKTC stretch HEP  R   Supine hip ER stretch 30 4 R   Supine piriformis and fig 4 stretch 30 4 R   Manual Intervention (86848)  Min: 18 min      Knee mobs/PROM      Tib/Fem Mobs      Patella Mobs      Prone figure 4 and anterior hip mobs 1 5    Hip belt mobs 1 10    LAD 1 3    NMR re-education (20548)  Min:   CUES NEEDED   [place] citizen/Biofeedback 10/10      BFR      G. Med activation      Hip Ext full ROM/ G. Activation      Bosu Bal and Prop- G Med      Single leg stance/Balance/Prop      Bosu Retro G. Med act      Prone Hip froggers- sliders/elevated            Therapeutic Activity (16739)  Min:      Ladders      Plyos      Dynamic Balance                            Therapeutic Exercise and NMR EXR  [x] (30336) Provided verbal/tactile cueing for activities related to strengthening, flexibility, endurance, ROM for improvements in LE, proximal hip, and core control with self care, mobility, lifting, ambulation. [x] (72716) Provided verbal/tactile cueing for activities related to improving balance, coordination, kinesthetic sense, posture, motor skill, proprioception  to assist with LE, proximal hip, and core control in self care, mobility, lifting, ambulation and eccentric single leg control.    NMR and Therapeutic Activities:    [x] (39583 or 71083) Provided verbal/tactile cueing for activities related to improving balance, coordination, kinesthetic sense, posture, motor skill, proprioception and motor activation to allow for proper function of core, proximal hip and LE with self care and ADLs and functional mobility.   [] (19934) Gait Re-education- Provided training and instruction to the patient for proper LE, core and proximal hip recruitment and positioning and eccentric body weight control with ambulation re-education including up and down stairs     Home Exercise Program:    [x] (39468) Reviewed/Progressed HEP activities related to strengthening, flexibility, endurance, ROM of core, proximal hip and LE for functional self-care, mobility, lifting and ambulation/stair navigation   [] (20722)Reviewed/Progressed HEP activities related to improving balance, coordination, kinesthetic sense, posture, motor skill, proprioception of core, proximal hip and LE for self care, mobility, lifting, and ambulation/stair navigation      Manual Treatments:  PROM / STM / Oscillations-Mobs:  G-I, II, III, IV (PA's, Inf., Post.)  [x] (82025) Provided manual therapy to mobilize LE, proximal hip and/or LS spine soft tissue/joints for the purpose of modulating pain, promoting relaxation,  increasing ROM, reducing/eliminating soft tissue swelling/inflammation/restriction, improving soft tissue extensibility and allowing for proper ROM for normal function with self care, mobility, lifting and ambulation. Modalities:     [] GAME READY (VASO)- for significant edema, swelling, pain control. Charges:  Timed Code Treatment Minutes: 30   Total Treatment Minutes: 31      [] EVAL (LOW) 35341 (typically 20 minutes face-to-face)  [] EVAL (MOD) 20914 (typically 30 minutes face-to-face)  [] EVAL (HIGH) 69689 (typically 45 minutes face-to-face)  [] RE-EVAL     [x] OV(80457) x   1  [] DRY NEEDLE 1 OR 2 MUSCLES  [] NMR (57429) x     [] DRY NEEDLE 3+ MUSCLES  [x] Manual (51538) x  1     [] TA (50318) x     [] Mech Traction (33365)  [] ES(attended) (41285)     [] ES (un) (84940):   [] VASO (37971)  [] Other:    If BW Please Indicate Time In/Out  CPT Code Time in Time out                                   GOALS:  Patient stated goal: improve movement of R hip  []? Progressing: []? Met: []?  Not Met: []? Strength   []  Stage 4: Dynamic Strength and Agility   []  Stage 5: Sport Specific Training     []  Ready to Return to Play, Meets All Above Stages   []  Not Ready for Return to Sports   Comments:            Treatment/Activity Tolerance:  [x] Patient tolerated treatment well [] Patient limited by fatique  [] Patient limited by pain  [] Patient limited by other medical complications  [] Other:     Overall Progression Towards Functional goals/ Treatment Progress Update:  [] Patient is progressing as expected towards functional goals listed. [] Progression is slowed due to complexities/Impairments listed. [] Progression has been slowed due to co-morbidities. [x] Plan just implemented, too soon to assess goals progression <30days   [] Goals require adjustment due to lack of progress  [] Patient is not progressing as expected and requires additional follow up with physician  [] Other    Prognosis for POC: [x] Good [] Fair  [] Poor    Patient requires continued skilled intervention: [x] Yes  [] No        PLAN: See eval  [] Continue per plan of care [] Alter current plan (see comments)  [x] Plan of care initiated [] Hold pending MD visit [] Discharge    Electronically signed by: Mikey Whitaker PT    Note: If patient does not return for scheduled/recommended follow up visits, this note will serve as a discharge from care along with the most recent update on progress.

## 2020-08-12 ENCOUNTER — OFFICE VISIT (OUTPATIENT)
Dept: ORTHOPEDIC SURGERY | Age: 55
End: 2020-08-12
Payer: COMMERCIAL

## 2020-08-12 VITALS — HEIGHT: 72 IN | BODY MASS INDEX: 29.12 KG/M2 | WEIGHT: 215 LBS | TEMPERATURE: 98.1 F

## 2020-08-12 PROBLEM — M54.16 LUMBAR RADICULAR PAIN: Status: ACTIVE | Noted: 2020-08-12

## 2020-08-12 PROBLEM — M25.551 RIGHT HIP PAIN: Status: ACTIVE | Noted: 2020-08-12

## 2020-08-12 PROBLEM — M25.561 RIGHT KNEE PAIN: Status: ACTIVE | Noted: 2020-08-12

## 2020-08-12 PROCEDURE — 99214 OFFICE O/P EST MOD 30 MIN: CPT | Performed by: PHYSICIAN ASSISTANT

## 2020-08-12 PROCEDURE — 3017F COLORECTAL CA SCREEN DOC REV: CPT | Performed by: PHYSICIAN ASSISTANT

## 2020-08-12 PROCEDURE — G8417 CALC BMI ABV UP PARAM F/U: HCPCS | Performed by: PHYSICIAN ASSISTANT

## 2020-08-12 PROCEDURE — G8427 DOCREV CUR MEDS BY ELIG CLIN: HCPCS | Performed by: PHYSICIAN ASSISTANT

## 2020-08-12 PROCEDURE — 4004F PT TOBACCO SCREEN RCVD TLK: CPT | Performed by: PHYSICIAN ASSISTANT

## 2020-08-12 RX ORDER — DIAZEPAM 10 MG/1
10 TABLET ORAL PRN
Qty: 1 TABLET | Refills: 0 | Status: SHIPPED | OUTPATIENT
Start: 2020-08-12 | End: 2020-08-26

## 2020-08-12 NOTE — PROGRESS NOTES
Subjective:      Patient ID: Oral D Natividad Gr is a 48 y.o.  male. Chief Complaint   Patient presents with    Hip Pain     Right hip        HPI: He is here for an initial evaluation of low back pain and right leg pain after fall in March 2020. Jossy Watson in his home. Pain radiates from his lower back down through his right leg to his ankle. Since his fall in March 2020 he has sustained a CVA which occurred in April 2020. 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 Pain has changed since onset. Pain has worsened  Pain is associated with:  Numbness: Right lower extremity. Tingling: Right lower extremity. Perceived weakness: Right quad. Difficulty controlling bowels: No.  Difficulty controlling bladder: No.  Electrical shock sensation in her legs: Yes. Difficulty with balance while standing or walking: Yes. The following changes pain for the better: Sitting. The following changes pain for the worse: Standing. The following treatment has been tried:  Physical therapy: No.  Chiropractic treatment: No.  Epidural steroid injection/block: No.   Prescription medications: No.   Over-the-counter medications: Tylenol with no relief. Review of Systems:  Pertinent items are noted in HPI. A 14 point review of systems have been reviewed from Patient History Form as well as the Spine Form dated on 8/12/2020 and available in the patient's chart under the media tab. Past Medical History:   Diagnosis Date    ADHD     Arthritis     Bipolar 1 disorder ([facility] [address]     Depression     Hypertension     Sleep apnea        No family history on file.     Past Surgical History:   Procedure Laterality Date    FRACTURE SURGERY Right     FX Tibia / Fibula    GASTROSTOMY TUBE PLACEMENT N/A 5/7/2020    EGD PEG TUBE PLACEMENT performed by Gema Kelsey MD at [address] Right     Remove Plate    SHOULDER ARTHROSCOPY Right 11/8/2019    RIGHT SHOULDER ARTHROSCOPY,  ROTATOR CUFF REPAIR, [address] on 7/22/2020) 1 Bottle 1    famotidine (PEPCID) 20 MG tablet 1 tablet by PEG Tube route 2 times daily (Patient not taking: Reported on 7/22/2020) 60 tablet 0    tamsulosin (FLOMAX) 0.4 MG capsule Take 1 capsule by mouth nightly 30 capsule 3    aspirin 81 MG EC tablet Take 1 tablet by mouth daily (Patient not taking: Reported on 7/22/2020) 30 tablet 3     No current facility-administered medications for this visit. Objective:     He is alert, oriented x 3, pleasant, well nourished, developed and in no acute distress. Temp 98.1 °F (36.7 °C) (Temporal)   Ht 6' (1.829 m)   Wt 215 lb (97.5 kg)   BMI 29.16 kg/m²        Lumbar Spine Exam:  Deformity: Absent . Soft Tissue Swelling: Absent . Soft Tissue Tenderness: Present. Midline Bone Tenderness:Present. Paraspinal Muscular Spasm: Absent . Previous Incisions: Absent . Erythema Absent . Lumbar Flexion does produce pain. Lumbar Extension does produce pain. Strength Scale: 1-5   Extensor Hallucis Longus L5 Anterior Tibialis   L4 Quadriceps   L2,3,4    Right             5            5         1   Left             5            5         5     Reflex Exam: 0-4+   Achilles Tendon (gastroc)   S1 Patellar Tendon (quads)   L4   Right                 0              0   Left                 2+              2+     Sensory Exam: Decreased sensation noted right lower extremity. Special Testing: N= Negative  P= Positive   Straight leg raise Reverse straight leg raise Contralateral straight leg raise Log roll David test (kristie) Babinski Quintana's  test   Right     N     N     N     N     N      N   Left     N     N     N     N     N      N       Gait normal Heel/ Toe. Left hip exam:  There is no deformity. There is no pain with internal and external rotation. There is no pain with flexion and extension. ROM- diminished range of motion. Trochanteric region is not tender to palpation. There is no pain with weight bearing.        Right hip exam:  There is no deformity. There is no pain with internal and external rotation. There is no pain with flexion and extension. ROM- diminished range of motion. Trochanteric region is not tender to palpation. There is no pain with weight bearing. Examination of the right knee: The alignment of the knee is neutral.   There is not erythema. There mild soft tissue swelling. There is mild effusion. ROM-  Extension 0          -   Flexion  120   There mild pain associated with ROM testing. Medial joint line is not tender to palpation. Lateral joint line is not tender to palpation. Retro patellar crepitus is not present. There is is not crepitus along the joint line with ROM testing. Varus Stress testing does not produce pain,                                     does not show laxity. Valgus Stress testing does not produce pain,                                       does not show laxity. Lachman's test- negative. Anterior Drawer test- negative. Posterior Drawer test- negative. Akbar's Test- negative. Patellar Compression testing does not produce pain. Extensor Mechanism is  intact. Vascular exam:  Examination of bilateral lower extremities:   Pallor or Rubor: absent. Digits are warm to touch, capillary refill is less than 2 seconds. There is No edema noted. Skin exam:  Examination of the skin over both lower extremities reveals: The skin to be intact without lacerations or abrasions. No significant erythema. No rashes or skin lesions. X Rays: performed in the office today:   AP and Lateral Lumbar Spine Radiographs: There is mild Degenerative Disc Disease. There is mild Facet Arthropathy. There is no Spondylolisthesis. There is no Compression Fractures. AP Pelvis:  Normal radiographic study. The alignment is anatomic. There are no radiographic findings to suggest fracture or dislocation.   AP Standing,Lateral and Sunrise of right Knee: Normal radiographic

## 2020-08-20 ENCOUNTER — HOSPITAL ENCOUNTER (OUTPATIENT)
Dept: MRI IMAGING | Age: 55
Discharge: HOME | End: 2020-08-20
Payer: COMMERCIAL

## 2020-08-20 PROCEDURE — 72148 MRI LUMBAR SPINE W/O DYE: CPT

## 2020-08-24 ENCOUNTER — TELEPHONE (OUTPATIENT)
Dept: ORTHOPEDIC SURGERY | Age: 55
End: 2020-08-24

## 2020-08-25 ENCOUNTER — TELEPHONE (OUTPATIENT)
Dept: ORTHOPEDIC SURGERY | Age: 55
End: 2020-08-25

## 2020-08-31 ENCOUNTER — TELEPHONE (OUTPATIENT)
Dept: ORTHOPEDIC SURGERY | Age: 55
End: 2020-08-31

## 2021-02-23 NOTE — PROGRESS NOTES
Lab notified of need for vanc trough due to inability to draw.  Soraida Shannon Referred by: Rob Brantley DO; Medical Diagnosis (from order):    Diagnosis Information      Diagnosis    723.1 (ICD-9-CM) - M54.2 (ICD-10-CM) - Cervicalgia    724.1, 338.29 (ICD-9-CM) - M54.6, G89.29 (ICD-10-CM) - Chronic bilateral thoracic back pain                Initial Evaluation    Visit:  1   Treatment Diagnosis: cervical, symptoms with increased pain/symptoms, impaired range of motion, impaired posture, impaired mobility, impaired strength  Chart reviewed at time of initial evaluation (relevant co-morbidities, allergies, tests and medications listed): Past Medical History:  6/5/2014: Anxiety disorder  No date: Depressive disorder  06/05/2014: Diabetes mellitus, type II (CMS/Formerly [facility])  6/5/2014: Diabetic neuropathy (CMS/[facility]ly [facility])  7/29/2015: Essential tremor  No date: Eye disorder      Comment:  bilateral vision loss, legally blind, does not drive  4/14/2015: Family history of premature CAD  No date: Glaucoma      Comment:  inflammatory and angle closure  No date: Herpes  7/1/2014: Hyperlipidemia  10/21/2014: Microalbuminuria  6/6/2014: Obesity, unspecified  6/5/2014: Type 2 diabetes mellitus with diabetic polyneuropathy, with   long-term current use of insulin (CMS/[facility])    SUBJECTIVE                                                                                                             Patient reports a 2 week history of neck pain. Has had symptoms in her cervical spine before. Reports no specific mechanism of injury. Patient reports pain with turning head to the right > than left, looking down causes head and neck symptoms. Able to sleep at night without cervical spine waking up at night. Symptoms worsen as the day goes on.     Pain / Symptoms:  Pain/symptom is: constant  Pain rating (out of 10): Current: 7 ; Best: 7; Worst: 9  Location: Right cervical spine and neck/upper traps     Quality / Description: sore, stiff.  Alleviating Factors: heat.   Progression since onset: no change  Function:   Limitations /  Exacerbation Factors: pain, difficulty  Prior Level of Function: declining function, therefore referred to therapy,  Patient Goals: increased motion and decreased pain    Prior treatment: no therapies  Discharged from hospital, home health, or skilled nursing facility in last 30 days: no    Home Environment:   Patient lives with alone  Type of home: apartment  Assistance available: as needed  Feels safe at home/work/school.  2 or more falls or an unexplained fall with injury in the last year:  No    OBJECTIVE                                                                                                                     Range of Motion (ROM)   (degrees unless noted; active unless noted; norms in ( ); negative=lacking to 0, positive=beyond 0)   WNL: ANNIE, YANA  Cervical:    - Flexion (45-50): 45°    - Extension (45-60): 35°    - Rotation (60-80):        • Left: 50°        • Right: 40°    - Side Bend (45):        • Left: 10°        • Right: 25°    Strength  (out of 5 unless noted, standard test position unless noted, lbs tested with hand held dynamometer)   Shoulder:     - Abduction:        • Left (C5): 4        • Right (C5): 3+, pain    - Internal Rotation:          • Left (C6/7): 4+        • Right (C6/7): 3+ and pain    - External Rotation:         • Left (C6/7): 4          • Right (C6/7): 3+ and pain  Elbow/Forearm:    - Flexion:        • Left (C6): 4        • Right (C6): 3+, pain    - Extension:        • Left (C7): 5        • Right (C7): 3+    Dermatome Testing:  C4 (shoulder, clavicular and upper scapular areas):     Light Touch: Left: intact Right: intact  C5 (deltoid area, anterior aspect of entire arm to base of thumb):     Light Touch: Left: intact Right: intact  C6 (anterior upper extremity, radial side of hand to 1st and 2nd digit):     Light Touch: Left: intact Right: intact  C7 (lateral upper extremity and forearm to 2nd through 4th digit):     Light Touch: Left: intact Right: intact  C8 (medial upper  extremity and forearm to 3rd through 5th):     Light Touch: Left: intact Right: intact  T1 (medial side of forearm to base of 5th digit):    Light Touch: Left: intact Right: intact          Comments / Details: Outcome Measures:   Neck Disability Index (NDI): Neck Disability Index Score: 24  NDI Total Possible Score: 45  NDI Score Calculated: 53.33 %  (scored 0-100, higher score indicates higher disability) see flowsheet for additional documentation    TREATMENT                                                                                                                initial evaluation completed    Therapeutic Exercise:  Cervical AROM into flexion, side bending and rotation 10 reps to each movement.  Shoulder rolls clockwise and counter clockwise.      Un-attended Electrical Stimulation (79784/): Interferential Current  Location: right upper traps and neck  Position: sitting  Pulse Rate:  Hz  Intensity: patient comfort  Cross pattern.  In conjunction with: moist hot pack  Duration: 15 minutes    ASSESSMENT                                                                                                           59 year old female patient has reported functional limitations listed above impacted by signs and symptoms consistent with below   • Involved: cervical   • Symptoms/impairments: increased pain/symptoms, impaired range of motion, impaired posture, impaired mobility and impaired strength  Patient with a ~2 week history of right upper traps pain presents with decreased neck range of motion, decreased strength in right upper extremity limiting patient's functional mobility.    Predicted patient presentation: Moderate (evolving) - Patient comorbidities and complexities, as defined above, may have varying impact on steady progress for prescribed plan of care.  Patient Education:   Who will be receiving education: patient  Preferred learning style: written and verbal  Barriers to learning: legally  blind  Results of above outlined education: Verbalizes understanding and Demonstrates understanding      PLAN                                                                                                                         The following skilled interventions to be implemented to achieve goals listed below:  Gait Training (57771)  Manual Therapy (78895)  Neuromuscular Re-Education (92226)  Therapeutic Activity (83082)  Therapeutic Exercise (32853)  Electrical Stimulation (00628//81018)  Heat/Cold (69668)    Frequency / Duration: 2 times per week tapering as patient progresses for an estimated total of 16 visits for 8 weeks    Patient given attendance policy at time of initial evaluation.    GOALS                                                                                                                           Long Term Goals: to be met be end of plan of care  1. Patient will have dynamic motion of head and neck in sitting and standing to scan environment for safe ambulation and movement in multiple environments.  2. Patient will sleep 6 hours without disruption from pain/difficulty with positional changes.   3. Neck Disability Index: Patient will complete form to reflect an improved score to less than or equal to 30% to indicate patient reported improvement in function/disability/impairment (minimal detectable change: 21%).  4. Patient will be independent with progressed and modified home exercise program.      Therapy proedure time and total treatment time can be found documented on the Time Entry flowsheet

## 2021-04-14 LAB
ALBUMIN SERPL-MCNC: 4.9 G/DL (ref 3.4–5)
ALP BLD-CCNC: 120 U/L (ref 40–129)
ALT SERPL-CCNC: 23 U/L (ref 10–40)
AST SERPL-CCNC: 24 U/L (ref 15–37)
BASOPHILS ABSOLUTE: 0.1 K/UL (ref 0–0.2)
BASOPHILS RELATIVE PERCENT: 0.7 %
BILIRUB SERPL-MCNC: 0.3 MG/DL (ref 0–1)
BILIRUBIN DIRECT: <0.2 MG/DL (ref 0–0.3)
BILIRUBIN, INDIRECT: NORMAL MG/DL (ref 0–1)
EOSINOPHILS ABSOLUTE: 0.2 K/UL (ref 0–0.6)
EOSINOPHILS RELATIVE PERCENT: 2.6 %
ESTRADIOL LEVEL: 34 PG/ML
HCT VFR BLD CALC: 45.4 % (ref 40.5–52.5)
HEMOGLOBIN: 15.4 G/DL (ref 13.5–17.5)
LYMPHOCYTES ABSOLUTE: 1.9 K/UL (ref 1–5.1)
LYMPHOCYTES RELATIVE PERCENT: 22.2 %
MCH RBC QN AUTO: 30 PG (ref 26–34)
MCHC RBC AUTO-ENTMCNC: 33.9 G/DL (ref 31–36)
MCV RBC AUTO: 88.6 FL (ref 80–100)
MONOCYTES ABSOLUTE: 0.8 K/UL (ref 0–1.3)
MONOCYTES RELATIVE PERCENT: 9.9 %
NEUTROPHILS ABSOLUTE: 5.4 K/UL (ref 1.7–7.7)
NEUTROPHILS RELATIVE PERCENT: 64.6 %
PDW BLD-RTO: 15.2 % (ref 12.4–15.4)
PLATELET # BLD: 243 K/UL (ref 135–450)
PMV BLD AUTO: 9.2 FL (ref 5–10.5)
PROSTATE SPECIFIC ANTIGEN: 1.2 NG/ML (ref 0–4)
RBC # BLD: 5.13 M/UL (ref 4.2–5.9)
TOTAL PROTEIN: 7.7 G/DL (ref 6.4–8.2)
WBC # BLD: 8.4 K/UL (ref 4–11)

## 2021-04-16 LAB — TESTOSTERONE TOTAL: 185 NG/DL (ref 220–1000)

## 2021-05-10 LAB
ALBUMIN SERPL-MCNC: 4.7 G/DL (ref 3.4–5)
ANION GAP SERPL CALCULATED.3IONS-SCNC: 13 MMOL/L (ref 3–16)
BASOPHILS ABSOLUTE: 0.1 K/UL (ref 0–0.2)
BASOPHILS RELATIVE PERCENT: 0.8 %
BUN BLDV-MCNC: 18 MG/DL (ref 7–20)
CALCIUM SERPL-MCNC: 8.9 MG/DL (ref 8.3–10.6)
CHLORIDE BLD-SCNC: 102 MMOL/L (ref 99–110)
CO2: 23 MMOL/L (ref 21–32)
CREAT SERPL-MCNC: 1.2 MG/DL (ref 0.9–1.3)
EOSINOPHILS ABSOLUTE: 0.2 K/UL (ref 0–0.6)
EOSINOPHILS RELATIVE PERCENT: 2.3 %
ESTRADIOL LEVEL: 26 PG/ML
GFR AFRICAN AMERICAN: >60
GFR NON-AFRICAN AMERICAN: >60
GLUCOSE BLD-MCNC: 121 MG/DL (ref 70–99)
HCT VFR BLD CALC: 44.2 % (ref 40.5–52.5)
HEMOGLOBIN: 15 G/DL (ref 13.5–17.5)
LUTEINIZING HORMONE: 3.8 MIU/ML
LYMPHOCYTES ABSOLUTE: 1.7 K/UL (ref 1–5.1)
LYMPHOCYTES RELATIVE PERCENT: 23.1 %
MCH RBC QN AUTO: 30.3 PG (ref 26–34)
MCHC RBC AUTO-ENTMCNC: 33.9 G/DL (ref 31–36)
MCV RBC AUTO: 89.3 FL (ref 80–100)
MONOCYTES ABSOLUTE: 0.9 K/UL (ref 0–1.3)
MONOCYTES RELATIVE PERCENT: 11.4 %
NEUTROPHILS ABSOLUTE: 4.7 K/UL (ref 1.7–7.7)
NEUTROPHILS RELATIVE PERCENT: 62.4 %
PDW BLD-RTO: 15.9 % (ref 12.4–15.4)
PHOSPHORUS: 3.4 MG/DL (ref 2.5–4.9)
PLATELET # BLD: 259 K/UL (ref 135–450)
PMV BLD AUTO: 9.1 FL (ref 5–10.5)
POTASSIUM SERPL-SCNC: 4.6 MMOL/L (ref 3.5–5.1)
PROSTATE SPECIFIC ANTIGEN: 1.21 NG/ML (ref 0–4)
RBC # BLD: 4.95 M/UL (ref 4.2–5.9)
SODIUM BLD-SCNC: 138 MMOL/L (ref 136–145)
WBC # BLD: 7.6 K/UL (ref 4–11)

## 2021-05-12 LAB — TESTOSTERONE TOTAL: 349 NG/DL (ref 220–1000)

## 2021-11-15 DIAGNOSIS — Z51.81 THERAPEUTIC DRUG MONITORING: ICD-10-CM

## 2021-11-15 LAB
ALBUMIN SERPL-MCNC: 4.9 G/DL (ref 3.4–5)
ALP BLD-CCNC: 119 U/L (ref 40–129)
ALT SERPL-CCNC: 27 U/L (ref 10–40)
ANION GAP SERPL CALCULATED.3IONS-SCNC: 15 MMOL/L (ref 3–16)
AST SERPL-CCNC: 23 U/L (ref 15–37)
BILIRUB SERPL-MCNC: 0.3 MG/DL (ref 0–1)
BILIRUBIN DIRECT: <0.2 MG/DL (ref 0–0.3)
BILIRUBIN, INDIRECT: NORMAL MG/DL (ref 0–1)
BUN BLDV-MCNC: 17 MG/DL (ref 7–20)
CALCIUM SERPL-MCNC: 9 MG/DL (ref 8.3–10.6)
CHLORIDE BLD-SCNC: 103 MMOL/L (ref 99–110)
CHOLESTEROL, TOTAL: 108 MG/DL (ref 0–199)
CO2: 23 MMOL/L (ref 21–32)
CREAT SERPL-MCNC: 1.1 MG/DL (ref 0.9–1.3)
GFR AFRICAN AMERICAN: >60
GFR NON-AFRICAN AMERICAN: >60
GLUCOSE BLD-MCNC: 95 MG/DL (ref 70–99)
HDLC SERPL-MCNC: 53 MG/DL (ref 40–60)
LDL CHOLESTEROL CALCULATED: 34 MG/DL
POTASSIUM SERPL-SCNC: 4.2 MMOL/L (ref 3.5–5.1)
SODIUM BLD-SCNC: 141 MMOL/L (ref 136–145)
TOTAL PROTEIN: 7 G/DL (ref 6.4–8.2)
TRIGL SERPL-MCNC: 104 MG/DL (ref 0–150)
VLDLC SERPL CALC-MCNC: 21 MG/DL

## 2022-03-21 NOTE — PROGRESS NOTES
Ortho     HPI: Oral RODRIGO Efrem Whyte presents to establish care    Chronic problems include hypertension, CVA left parietal lobe, oversupplementation testosterone, mild DJD neck, UTI, neurocognitive disorder anxiety, hypertension, tobacco    chronic pain 600 tid right sided pain. Wishes to have some pain medication. Tylenol not effective. Likes Advil. Mood  buspar 15 bid, welllbutrin 300 q day, ttrazadone 100-200 mg at night. Would like to start Klonopin again. Intermittent itching, irritability, anxiety. No longer with psychiatry. History of substance abuse. HTN atorvastatin, basa, amlodipine. Echocardiograms for 2020 limited with normal thickness left ventricular size and function no chest pain palpitations or new exercise intolerance. Denies shortness of breath. Has falls. Right-sided numbness. Uses a weight belt. Wishes to have a parking sticker. No fractures. Rotator cuff tear on right. Needs to lose weight prior to surgery. Allergic rhinitis. Claritin Flonase as needed albuterol. Continues to smoke 1/2 pack a day. Began smoking at 14years of age was up to pack a day. States childhood pneumonia. Chest x-ray 5/2020 with prominent descending thoracic aorta mild linear atelectasis and scarring in the right base and elevation of the right hemidiaphragm    ED reflux. PPI. Upper endoscopy with focal atypia no dysplasia insistent with a Almeida's esophagus. This was in May 2020. Continue to smoke no alcohol or recreational drugs. 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 Dates she has power of  although no documentation today. We will get on hip up. Tobacco 1/2 ppd. No alcohol, no drugs. Fishing hiking walks the cat. 20 min exercise daily. has walker, wheelchair, shower chair, handles. FH: Only child    Review of systems  Needs eye exam.  Neurology pending. Positive mental status changes CVA. Right sided paresthesias. Denies any palpitations irregular beats. Remote pneumonia. Positive tobacco.  GE reflux. Intermittent constipation. Low testosterone. Urinary hesitancy. Tattoos. Anger and depression. History of substance abuse. s                   Constitutional, ent, CV, respiratory, GI, , joint, skin, allergic and psychiatric ROS reviewed and negative except for above    Allergies   Allergen Reactions    Reglan [Metoclopramide]      Lock jaw       Outpatient Medications Marked as Taking for the 3/22/22 encounter (Office Visit) with Fallon Gramajo MD   Medication Sig Dispense Refill    famotidine (PEPCID) 20 MG tablet 1 po bid in addition to prilosec for heartburn prn 60 tablet 5    omeprazole (PRILOSEC) 40 MG delayed release capsule TAKE ONE CAPSULE BY MOUTH ONCE DAILY 30 capsule 2    tamsulosin (FLOMAX) 0.4 MG capsule TAKE ONE CAPSULE BY MOUTH AT BEDTIME 30 capsule 3    gabapentin (NEURONTIN) 600 MG tablet TAKE ONE TABLET THREE TIMES A DAY 90 tablet 2    memantine (NAMENDA) 5 MG tablet take 1 tablet by mouth daily 60 tablet 0    busPIRone (BUSPAR) 15 MG tablet take 1/2 tablet by mouth 2 times daily 60 tablet 0    amLODIPine (NORVASC) 5 MG tablet 1 TABLET(S) ORAL EVERY DAY 30 tablet 2    diclofenac sodium (VOLTAREN) 1 % GEL APPLY TWO GRAMS TOPICALLY EVERY 8 HOURS AS NEEDED FOR 30 DAYS 100 g 5    loratadine (CLARITIN) 10 MG tablet loratadine 10 mg tablet      fluticasone (FLONASE) 50 MCG/ACT nasal spray fluticasone propionate 50 mcg/actuation nasal spray,suspension   SPRAY ONE SPRAY into each nostril every DAY      traZODone (DESYREL) 100 MG tablet Take 1 tablet by mouth nightly 60 tablet 1    albuterol sulfate  (90 Base) MCG/ACT inhaler albuterol sulfate HFA 90 mcg/actuation aerosol inhaler   Inhale 2 puffs every 4 hours by inhalation route.       nicotine (NICODERM CQ) 21 MG/24HR nicotine 21 mg/24 hr daily transdermal patch      aspirin EC 81 MG EC tablet Take 1 tablet by mouth daily 30 tablet 5    b complex-C-E-zinc (STRESS FORMULA W/ ZINC) tablet Take 1 tablet by mouth daily      buPROPion (WELLBUTRIN XL) 300 MG extended release tablet       diclofenac sodium (VOLTAREN) 1 % GEL       ondansetron (ZOFRAN-ODT) 4 MG disintegrating tablet Take 4 mg by mouth every 8 hours as needed      testosterone (ANDROGEL; TESTIM) 50 MG/5GM (1%) GEL 1% gel       atorvastatin (LIPITOR) 80 MG tablet 1 tablet by PEG Tube route nightly 90 tablet 3    Blood Pressure KIT Check BP 1-2 times daily 1 kit 0             Past Medical History:   Diagnosis Date    ADHD     Arthritis     Bipolar 1 disorder ([address]     Depression     Hypertension     Sleep apnea        Past Surgical History:   Procedure Laterality Date    FRACTURE SURGERY Right     FX Tibia / Fibula    GASTROSTOMY TUBE PLACEMENT N/A 5/7/2020    EGD PEG TUBE PLACEMENT performed by Sandrine Downs MD at [facility]. [place] Right     Remove Plate    SHOULDER ARTHROSCOPY Right 11/8/2019    RIGHT SHOULDER ARTHROSCOPY,  ROTATOR CUFF REPAIR, SUBACROMIC DECOMPRESSION, LABRIAL DEBRIDEMENT performed by Cande Valle MD at [address] [place] [address]      x 4    UPPER GASTROINTESTINAL ENDOSCOPY N/A 5/7/2020    EGD BIOPSY performed by Sandrine Downs MD at [address] [place] [address] Ave N ENDOSCOPY           Objective     BP (!) 140/90   Pulse 70   Resp 16   Ht 5' 11\" (1.803 m)   Wt 239 lb (108.4 kg)   SpO2 98% Comment: RA  BMI 33.33 kg/m²     @LASTSAO2(3)@    Wt Readings from Last 3 Encounters:   11/22/21 228 lb (103.4 kg)   11/08/21 225 lb (102.1 kg)   08/12/20 215 lb (97.5 kg)       Physical Exam     NAD alert and cooperative  HEENT: Large tonsils. No gag. Fair dentition. TMs unremarkable. Cranial nerves are intact. Visual fields intact. Good upstroke of the carotids. No bruits or adenopathy. Lungs diminished breath sounds however no wheezes rales or rhonchi.   Cardiovascular exam regular rate and rhythm without murmur click. Normal heart sounds. Abdomen I do not detect any hepatosplenomegaly or epigastric tenderness. Decreased sensation upper and lower extremities. Decreased range of motion right shoulder. No suspicious skin lesions. Mildly flattened affect however appropriate. Good eye contact. No gross hearing loss. Chemistry        Component Value Date/Time     11/15/2021 0952    K 4.2 11/15/2021 0952    K 5.0 05/09/2020 0400     11/15/2021 0952    CO2 23 11/15/2021 0952    BUN 17 11/15/2021 0952    CREATININE 1.1 11/15/2021 0952        Component Value Date/Time    CALCIUM 9.0 11/15/2021 0952    ALKPHOS 119 11/15/2021 0952    AST 23 11/15/2021 0952    ALT 27 11/15/2021 0952    BILITOT 0.3 11/15/2021 0952            Lab Results   Component Value Date    WBC 8.6 02/10/2022    HGB 14.0 02/10/2022    HCT 42.4 02/10/2022    MCV 91.9 02/10/2022     02/10/2022     Lab Results   Component Value Date    LABA1C 5.3 04/22/2020     Lab Results   Component Value Date    .4 04/22/2020     Lab Results   Component Value Date    LABA1C 5.3 04/22/2020     No components found for: CHLPL  Lab Results   Component Value Date    TRIG 104 11/15/2021    TRIG 230 (H) 04/23/2020     Lab Results   Component Value Date    HDL 53 11/15/2021    HDL 41 04/23/2020     Lab Results   Component Value Date    LDLCALC 34 11/15/2021    LDLCALC 94 04/23/2020     Lab Results   Component Value Date    LABVLDL 21 11/15/2021    LABVLDL 46 04/23/2020       Old labs and records reviewed or requested  Discussed past lab and studies with patient      Diagnosis Orders   1. History of CVA in adulthood  Lipid Panel   2. Tobacco abuse     3. Primary hypertension  EKG 12 Lead    Comprehensive Metabolic Panel   4. Chronic pain syndrome     5. Complete tear of right rotator cuff, unspecified whether traumatic     6. Mood disorder ([facility] [address]     7. History of stroke in adulthood  amLODIPine (NORVASC) 5 MG tablet   8.  [address], Shavon Whitfield MD, Sleep Medicine, [facility]   9. Sleep apnea, unspecified type  Sara Lomas MD, Sleep Medicine, [facility]   10. Almeida's esophagus without dysplasia     11. Obesity (BMI 30.0-34.9)     12. History of substance abuse ([facility] [address]       Documented CVA. Dysarthria decreased gag right-sided paresthesias continue to smoke. Positive statin aspirin echocardiogram without shunt. 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 Chronic pain. Tobacco abuse. Discussed cessation. Normal chest x-ray in the past.    Hypertension. Compliant with medicine EKG unremarkable. Chronic pain syndrome. May need to follow-up with pain specialist.    Tear right rotator cuff. Follow-up surgery when stable. Stated he needs to lose weight. Irritability anxiety needs psychiatry was given [facility] numbers. Continue current medication stated I would give 1 months worth. Snoring witnessed sleep apnea. Sleep study not the first thing on his list.    Poor gag. Consider swallow study care of with swallowing. Probable Almeida's esophagus. Needs upper endoscopy  On this date I have spent  90 minutes reviewing previous notes, test results, and face to face with the patient discussing the diagnosis and plan          No follow-ups on file. Diagnosis and treatment discussed.   Possible side effects of medication reviewed  Patients questions answered  Follow up understood  Pt aware if they are not contacted about any test results , this does not mean they are normal.  They should call

## 2022-03-22 ENCOUNTER — OFFICE VISIT (OUTPATIENT)
Dept: FAMILY MEDICINE CLINIC | Age: 57
End: 2022-03-22
Payer: MEDICAID

## 2022-03-22 VITALS
SYSTOLIC BLOOD PRESSURE: 140 MMHG | BODY MASS INDEX: 33.46 KG/M2 | WEIGHT: 239 LBS | HEIGHT: 71 IN | HEART RATE: 70 BPM | OXYGEN SATURATION: 98 % | RESPIRATION RATE: 16 BRPM | DIASTOLIC BLOOD PRESSURE: 90 MMHG

## 2022-03-22 DIAGNOSIS — K22.70 BARRETT'S ESOPHAGUS WITHOUT DYSPLASIA: ICD-10-CM

## 2022-03-22 DIAGNOSIS — Z86.73 HISTORY OF CVA IN ADULTHOOD: Primary | ICD-10-CM

## 2022-03-22 DIAGNOSIS — F19.11 HISTORY OF SUBSTANCE ABUSE (HCC): ICD-10-CM

## 2022-03-22 DIAGNOSIS — I10 PRIMARY HYPERTENSION: ICD-10-CM

## 2022-03-22 DIAGNOSIS — M75.121 COMPLETE TEAR OF RIGHT ROTATOR CUFF, UNSPECIFIED WHETHER TRAUMATIC: ICD-10-CM

## 2022-03-22 DIAGNOSIS — G47.30 SLEEP APNEA, UNSPECIFIED TYPE: ICD-10-CM

## 2022-03-22 DIAGNOSIS — Z86.73 HISTORY OF STROKE IN ADULTHOOD: ICD-10-CM

## 2022-03-22 DIAGNOSIS — Z72.0 TOBACCO ABUSE: ICD-10-CM

## 2022-03-22 DIAGNOSIS — G89.4 CHRONIC PAIN SYNDROME: ICD-10-CM

## 2022-03-22 DIAGNOSIS — F39 MOOD DISORDER (HCC): ICD-10-CM

## 2022-03-22 DIAGNOSIS — R06.83 SNORING: ICD-10-CM

## 2022-03-22 DIAGNOSIS — E66.9 OBESITY (BMI 30.0-34.9): ICD-10-CM

## 2022-03-22 LAB
A/G RATIO: 2.1 (ref 1.1–2.2)
ALBUMIN SERPL-MCNC: 5.1 G/DL (ref 3.4–5)
ALP BLD-CCNC: 131 U/L (ref 40–129)
ALT SERPL-CCNC: 26 U/L (ref 10–40)
ANION GAP SERPL CALCULATED.3IONS-SCNC: 12 MMOL/L (ref 3–16)
AST SERPL-CCNC: 22 U/L (ref 15–37)
BILIRUB SERPL-MCNC: 0.4 MG/DL (ref 0–1)
BUN BLDV-MCNC: 17 MG/DL (ref 7–20)
CALCIUM SERPL-MCNC: 9.9 MG/DL (ref 8.3–10.6)
CHLORIDE BLD-SCNC: 105 MMOL/L (ref 99–110)
CHOLESTEROL, TOTAL: 170 MG/DL (ref 0–199)
CO2: 24 MMOL/L (ref 21–32)
CREAT SERPL-MCNC: 1.2 MG/DL (ref 0.9–1.3)
GFR AFRICAN AMERICAN: >60
GFR NON-AFRICAN AMERICAN: >60
GLUCOSE BLD-MCNC: 98 MG/DL (ref 70–99)
HDLC SERPL-MCNC: 52 MG/DL (ref 40–60)
LDL CHOLESTEROL CALCULATED: 80 MG/DL
POTASSIUM SERPL-SCNC: 4.8 MMOL/L (ref 3.5–5.1)
SODIUM BLD-SCNC: 141 MMOL/L (ref 136–145)
TOTAL PROTEIN: 7.5 G/DL (ref 6.4–8.2)
TRIGL SERPL-MCNC: 190 MG/DL (ref 0–150)
VLDLC SERPL CALC-MCNC: 38 MG/DL

## 2022-03-22 PROCEDURE — 36415 COLL VENOUS BLD VENIPUNCTURE: CPT | Performed by: INTERNAL MEDICINE

## 2022-03-22 PROCEDURE — 99205 OFFICE O/P NEW HI 60 MIN: CPT | Performed by: INTERNAL MEDICINE

## 2022-03-22 PROCEDURE — 93000 ELECTROCARDIOGRAM COMPLETE: CPT | Performed by: INTERNAL MEDICINE

## 2022-03-22 RX ORDER — TRAZODONE HYDROCHLORIDE 100 MG/1
100 TABLET ORAL NIGHTLY
Qty: 60 TABLET | Refills: 1 | Status: SHIPPED | OUTPATIENT
Start: 2022-03-22 | End: 2022-08-18

## 2022-03-22 RX ORDER — FAMOTIDINE 20 MG/1
TABLET, FILM COATED ORAL
Qty: 60 TABLET | Refills: 5 | Status: SHIPPED | OUTPATIENT
Start: 2022-03-22 | End: 2022-04-20

## 2022-03-22 RX ORDER — AMLODIPINE BESYLATE 5 MG/1
TABLET ORAL
Qty: 30 TABLET | Refills: 2 | Status: SHIPPED | OUTPATIENT
Start: 2022-03-22 | End: 2022-06-16

## 2022-03-22 RX ORDER — ASPIRIN 81 MG/1
81 TABLET ORAL DAILY
Qty: 30 TABLET | Refills: 11 | Status: SHIPPED | OUTPATIENT
Start: 2022-03-22

## 2022-03-22 RX ORDER — MEMANTINE HYDROCHLORIDE 5 MG/1
5 TABLET ORAL DAILY
Qty: 60 TABLET | Refills: 1 | Status: SHIPPED | OUTPATIENT
Start: 2022-03-22 | End: 2022-08-18

## 2022-03-22 RX ORDER — ALBUTEROL SULFATE 90 UG/1
AEROSOL, METERED RESPIRATORY (INHALATION)
Qty: 18 G | Refills: 5 | Status: SHIPPED | OUTPATIENT
Start: 2022-03-22

## 2022-03-22 RX ORDER — BUSPIRONE HYDROCHLORIDE 15 MG/1
TABLET ORAL
Qty: 60 TABLET | Refills: 0 | Status: SHIPPED | OUTPATIENT
Start: 2022-03-22 | End: 2022-06-30

## 2022-03-22 RX ORDER — BUPROPION HYDROCHLORIDE 150 MG/1
150 TABLET ORAL EVERY MORNING
Qty: 30 TABLET | Refills: 0 | Status: SHIPPED | OUTPATIENT
Start: 2022-03-22 | End: 2022-05-31

## 2022-03-22 NOTE — PATIENT INSTRUCTIONS
Call to set up neurology appointment. Call numbers below for psychiatrist   [facility] Mental health access point  [address] [facility] behavioral health services [phone] [facility] crisis unit     Did you with your omeprazole. You may use your Pepcid or famotidine as needed up to twice a day for additional heartburn. Do your best to cut back on any caffeine and tobacco.    I have refilled your gabapentin Namenda buspirone trazodone and BuSpar. Be sure to follow-up with the mental health numbers above for further refills. I recommend getting a Covid booster. I have ordered a sleep test to see if you have sleep apnea. If treated blood pressure can be improved with this. Careful with swallowing. You did not have much of a gag reflex and risk for having aspiration of food. We can order a test sometime in the future if desired.     Do not forget to take your letter to the [facility] for your parking sticker

## 2022-03-23 DIAGNOSIS — K22.70 BARRETT'S ESOPHAGUS WITHOUT DYSPLASIA: Primary | ICD-10-CM

## 2022-03-29 PROBLEM — F99 PSYCHIATRIC DISORDER: Status: ACTIVE | Noted: 2022-03-29

## 2022-04-12 RX ORDER — LORATADINE 10 MG/1
TABLET ORAL
Qty: 30 TABLET | Refills: 2 | Status: SHIPPED | OUTPATIENT
Start: 2022-04-12 | End: 2022-06-16

## 2022-04-12 NOTE — TELEPHONE ENCOUNTER
Request loratidine ordered 11/22/21                              Last ov 3/22/22      Next ov 4/25/22

## 2022-04-20 RX ORDER — FAMOTIDINE 20 MG/1
TABLET, FILM COATED ORAL
Qty: 14 TABLET | Refills: 5 | Status: SHIPPED | OUTPATIENT
Start: 2022-04-20 | End: 2022-05-31

## 2022-04-21 RX ORDER — FAMOTIDINE 20 MG/1
TABLET, FILM COATED ORAL
Qty: 14 TABLET | Refills: 5 | OUTPATIENT
Start: 2022-04-21

## 2022-04-22 ENCOUNTER — TELEPHONE (OUTPATIENT)
Dept: FAMILY MEDICINE CLINIC | Age: 57
End: 2022-04-22

## 2022-04-22 NOTE — TELEPHONE ENCOUNTER
ELIO-- when calling to confirm pt's appt for Monday, he cancelled. He stated he is going to have to go out of town. His parents are not doing well. He has rescheduled appt for 5/16. He is hoping he will still be able to get refill on meds when he needs them prior to appt. Please advise.

## 2022-04-22 NOTE — TELEPHONE ENCOUNTER
Called pt, no answer and vm full. I am also sending message via [employer]. If patient returns call find out what medications he needs and confirm where he would like sent.

## 2022-04-26 NOTE — TELEPHONE ENCOUNTER
Called Annem[facility]) she states they have not seen GI but will call to schedule. Pepcid is working fine.

## 2022-05-04 ENCOUNTER — TELEPHONE (OUTPATIENT)
Dept: FAMILY MEDICINE CLINIC | Age: 57
End: 2022-05-04

## 2022-05-04 NOTE — TELEPHONE ENCOUNTER
Kirit Lang Pt's POA called in requesting a pre op visit for Pt. Pt is scheduled for surgery on May 11th and there is no availability. Please advise.  Pt is reachable at [phone]

## 2022-05-06 NOTE — TELEPHONE ENCOUNTER
Pt called to scheduled any appt. Advised we no longer have any appts and he would need to call his surgeons office.

## 2022-05-09 ENCOUNTER — TELEPHONE (OUTPATIENT)
Dept: FAMILY MEDICINE CLINIC | Age: 57
End: 2022-05-09

## 2022-05-09 NOTE — TELEPHONE ENCOUNTER
Requesting signed copy of EKG faxed to [phone].    Same day surgery dept reachable at [phone]

## 2022-05-31 ENCOUNTER — TELEPHONE (OUTPATIENT)
Dept: FAMILY MEDICINE CLINIC | Age: 57
End: 2022-05-31

## 2022-05-31 RX ORDER — FAMOTIDINE 20 MG/1
TABLET, FILM COATED ORAL
Qty: 14 TABLET | Refills: 5 | Status: SHIPPED | OUTPATIENT
Start: 2022-05-31 | End: 2022-06-20

## 2022-05-31 RX ORDER — BUPROPION HYDROCHLORIDE 150 MG/1
TABLET ORAL
Qty: 30 TABLET | Refills: 0 | Status: SHIPPED | OUTPATIENT
Start: 2022-05-31 | End: 2022-06-30

## 2022-05-31 NOTE — TELEPHONE ENCOUNTER
[facility] from [facility] called checking on the quantity of  Famotidine 20 Mg tablets for Pt. I spoke with Dr Bonita Guzman and she stated a quantity of 60 is fine. I advised Gretel Bear and she said thank you.

## 2022-06-05 ENCOUNTER — HOSPITAL ENCOUNTER (EMERGENCY)
Age: 57
Discharge: LEFT BEFORE DISCHARGE | End: 2022-06-05
Payer: MEDICAID

## 2022-06-05 VITALS
HEIGHT: 71 IN | SYSTOLIC BLOOD PRESSURE: 144 MMHG | TEMPERATURE: 99.3 F | WEIGHT: 252.87 LBS | OXYGEN SATURATION: 94 % | BODY MASS INDEX: 35.4 KG/M2 | HEART RATE: 94 BPM | RESPIRATION RATE: 16 BRPM | DIASTOLIC BLOOD PRESSURE: 89 MMHG

## 2022-06-05 PROCEDURE — 93005 ELECTROCARDIOGRAM TRACING: CPT | Performed by: EMERGENCY MEDICINE

## 2022-06-05 ASSESSMENT — PAIN DESCRIPTION - LOCATION: LOCATION: SHOULDER

## 2022-06-05 ASSESSMENT — PAIN - FUNCTIONAL ASSESSMENT: PAIN_FUNCTIONAL_ASSESSMENT: 0-10

## 2022-06-05 ASSESSMENT — PAIN SCALES - GENERAL: PAINLEVEL_OUTOF10: 7

## 2022-06-06 LAB
EKG ATRIAL RATE: 93 BPM
EKG DIAGNOSIS: NORMAL
EKG P AXIS: 29 DEGREES
EKG P-R INTERVAL: 158 MS
EKG Q-T INTERVAL: 348 MS
EKG QRS DURATION: 98 MS
EKG QTC CALCULATION (BAZETT): 432 MS
EKG R AXIS: 27 DEGREES
EKG T AXIS: 27 DEGREES
EKG VENTRICULAR RATE: 93 BPM

## 2022-06-06 PROCEDURE — 93010 ELECTROCARDIOGRAM REPORT: CPT | Performed by: INTERNAL MEDICINE
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
## 2022-06-16 DIAGNOSIS — Z86.73 HISTORY OF STROKE IN ADULTHOOD: ICD-10-CM

## 2022-06-16 RX ORDER — LORATADINE 10 MG/1
TABLET ORAL
Qty: 30 TABLET | Refills: 2 | Status: SHIPPED | OUTPATIENT
Start: 2022-06-16 | End: 2022-09-26

## 2022-06-16 RX ORDER — AMLODIPINE BESYLATE 5 MG/1
TABLET ORAL
Qty: 30 TABLET | Refills: 2 | Status: SHIPPED | OUTPATIENT
Start: 2022-06-16 | End: 2022-09-26

## 2022-06-20 RX ORDER — FAMOTIDINE 20 MG/1
TABLET, FILM COATED ORAL
Qty: 14 TABLET | Refills: 11 | Status: SHIPPED | OUTPATIENT
Start: 2022-06-20

## 2022-06-30 RX ORDER — BUSPIRONE HYDROCHLORIDE 15 MG/1
TABLET ORAL
Qty: 60 TABLET | Refills: 0 | Status: SHIPPED | OUTPATIENT
Start: 2022-06-30 | End: 2022-09-06

## 2022-06-30 RX ORDER — BUPROPION HYDROCHLORIDE 150 MG/1
TABLET ORAL
Qty: 30 TABLET | Refills: 0 | Status: SHIPPED | OUTPATIENT
Start: 2022-06-30 | End: 2022-07-12

## 2022-07-12 ENCOUNTER — TELEMEDICINE (OUTPATIENT)
Dept: FAMILY MEDICINE CLINIC | Age: 57
End: 2022-07-12
Payer: MEDICAID

## 2022-07-12 DIAGNOSIS — F99 PSYCHIATRIC DISORDER: Primary | ICD-10-CM

## 2022-07-12 DIAGNOSIS — Z72.0 TOBACCO ABUSE: ICD-10-CM

## 2022-07-12 DIAGNOSIS — I10 PRIMARY HYPERTENSION: ICD-10-CM

## 2022-07-12 DIAGNOSIS — F19.11 SUBSTANCE ABUSE IN REMISSION (HCC): ICD-10-CM

## 2022-07-12 DIAGNOSIS — R11.15 EMESIS, PERSISTENT: ICD-10-CM

## 2022-07-12 DIAGNOSIS — Z86.73 HISTORY OF CVA IN ADULTHOOD: ICD-10-CM

## 2022-07-12 PROCEDURE — 99214 OFFICE O/P EST MOD 30 MIN: CPT | Performed by: INTERNAL MEDICINE

## 2022-07-12 RX ORDER — ONDANSETRON 4 MG/1
TABLET, ORALLY DISINTEGRATING ORAL
Qty: 60 TABLET | Refills: 0 | Status: SHIPPED | OUTPATIENT
Start: 2022-07-12

## 2022-07-12 RX ORDER — TAMSULOSIN HYDROCHLORIDE 0.4 MG/1
CAPSULE ORAL
Qty: 30 CAPSULE | Refills: 3 | Status: SHIPPED | OUTPATIENT
Start: 2022-07-12

## 2022-07-12 RX ORDER — ESOMEPRAZOLE MAGNESIUM 40 MG/1
CAPSULE, DELAYED RELEASE ORAL
Qty: 60 CAPSULE | Refills: 1 | Status: SHIPPED | OUTPATIENT
Start: 2022-07-12

## 2022-07-12 RX ORDER — GABAPENTIN 800 MG/1
TABLET ORAL
Qty: 90 TABLET | Refills: 1 | Status: SHIPPED | OUTPATIENT
Start: 2022-07-12 | End: 2022-09-06

## 2022-07-12 ASSESSMENT — PATIENT HEALTH QUESTIONNAIRE - PHQ9
1. LITTLE INTEREST OR PLEASURE IN DOING THINGS: 3
6. FEELING BAD ABOUT YOURSELF - OR THAT YOU ARE A FAILURE OR HAVE LET YOURSELF OR YOUR FAMILY DOWN: 3
SUM OF ALL RESPONSES TO PHQ QUESTIONS 1-9: 19
9. THOUGHTS THAT YOU WOULD BE BETTER OFF DEAD, OR OF HURTING YOURSELF: 0
7. TROUBLE CONCENTRATING ON THINGS, SUCH AS READING THE NEWSPAPER OR WATCHING TELEVISION: 3
10. IF YOU CHECKED OFF ANY PROBLEMS, HOW DIFFICULT HAVE THESE PROBLEMS MADE IT FOR YOU TO DO YOUR WORK, TAKE CARE OF THINGS AT HOME, OR GET ALONG WITH OTHER PEOPLE: 0
3. TROUBLE FALLING OR STAYING ASLEEP: 2
8. MOVING OR SPEAKING SO SLOWLY THAT OTHER PEOPLE COULD HAVE NOTICED. OR THE OPPOSITE, BEING SO FIGETY OR RESTLESS THAT YOU HAVE BEEN MOVING AROUND A LOT MORE THAN USUAL: 2
5. POOR APPETITE OR OVEREATING: 1
4. FEELING TIRED OR HAVING LITTLE ENERGY: 2
SUM OF ALL RESPONSES TO PHQ9 QUESTIONS 1 & 2: 6
SUM OF ALL RESPONSES TO PHQ QUESTIONS 1-9: 19
2. FEELING DOWN, DEPRESSED OR HOPELESS: 3
SUM OF ALL RESPONSES TO PHQ QUESTIONS 1-9: 19
SUM OF ALL RESPONSES TO PHQ QUESTIONS 1-9: 19

## 2022-07-12 NOTE — PROGRESS NOTES
Oral D Rojelio, was evaluated through a synchronous (real-time) audio-video encounter. The patient (or guardian if applicable) is aware that this is a billable service, which includes applicable co-pays. This Virtual Visit was conducted with patient's (and/or legal guardian's) consent. The visit was conducted pursuant to the emergency declaration under the [facility], [address] [place] [address] authority and the Scope 5 and Relevant [employer] General Act. Patient identification was verified, and a caregiver was present when appropriate. The patient was located in a state where the provider was licensed to provide care. Patient identification was verified at the start of the visit: Yes    Total time spent on this encounter: Not billed by time    Services were provided through a video synchronous discussion virtually to substitute for in-person clinic visit. Patient and provider were located at their individual homes. --Tim Akins MD on 7/12/2022 at 7:26 AM    An electronic signature was used to authenticate this note. HPI: Oral D Rosa Tyrone presents for medication refill      Chronic problems include hypertension, CVA left parietal lobe, oversupplementation testosterone, mild DJD neck, UTI, neurocognitive disorder anxiety, hypertension, tobacco       Mood disorder. Has not yet established with [facility] [facility]. Feels like gabapentin was of benefit for him initially however not as good now. No suicidal intent. Family members ill. buspar 15 bid, welllbutrin 300 q day, ttrazadone 100-200 mg at night. History of substance abuse. Feels like gabapentin of benefit.      HTN atorvastatin, basa, amlodipine. Echocardiograms for 2020 limited with normal thickness left ventricular size and function no chest pain palpitations or new exercise intolerance. Denies shortness of breath. BP at home 120/80     Has falls. Right-sided numbness. Uses a weight belt. +parking sticker. No fractures. Rotator cuff tear on right. Needs to lose weight prior to surgery.     Allergic rhinitis. Claritin Flonase as needed albuterol. Continues to smoke 1/2 pack a day. Began smoking at 14years of age was up to pack a day. States childhood pneumonia. Chest x-ray 5/2020 with prominent descending thoracic aorta mild linear atelectasis and scarring in the right base and elevation of the right hemidiaphragm     ED reflux. PPI. Upper endoscopy with focal atypia no dysplasia insistent with a Almeida's esophagus. This was in May 2020. Continue to smoke. emesis. No alcohol or recreational drugs. No  caffiene upcoming appointment with Dr. Joann Jordan for colonoscopy and evaluation. No hematemesis hematochezia. Feels like he is lost some weight. No abdominal pain. Eats right before he goes to bed. No caffeine.     Specialist   Neurologist laurie burns but yet to establish   no psychiatrist    No eye    [facility] apt    Valerie rivera     SH: home health care live-in x 5 years. Dates she has power of  although no documentation today. We will get on hip up. Tobacco 1/2 ppd. No alcohol, no drugs. Woop!Wear walks the cat. 20 min exercise daily. has walker, wheelchair, shower chair, handles.      FH: Only child     Review of systems  Needs eye exam.  Neurology pending. Positive mental status changes CVA. Right sided paresthesias. Denies any palpitations irregular beats. Remote pneumonia. Positive tobacco.  GE reflux. Intermittent constipation. Low testosterone. Urinary hesitancy. Tattoos. Anger and depression. History of substance abuse.     Constitutional, ent, CV, respiratory, GI, , joint, skin, allergic and psychiatric ROS reviewed and negative except for above    Allergies   Allergen Reactions    Reglan [Metoclopramide]      Lock jaw       Outpatient Medications Marked as Taking for the 7/12/22 encounter (Telemedicine) with Angie Hampton MD   Medication Sig Dispense Refill    tamsulosin (FLOMAX) 0.4 MG capsule TAKE ONE CAPSULE BY MOUTH AT BEDTIME 30 capsule 3    busPIRone (BUSPAR) 15 MG tablet take 1/2 tablet by mouth two times daily 60 tablet 0    famotidine (PEPCID) 20 MG tablet take one tablet twice a day in addition to prilosec for heartburn as needed 14 tablet 11    loratadine (CLARITIN) 10 MG tablet TAKE ONE TABLET BY MOUTH ONCE DAILY 30 tablet 2    amLODIPine (NORVASC) 5 MG tablet take 1 tablet every day 30 tablet 2    memantine (NAMENDA) 5 MG tablet Take 1 tablet by mouth daily 60 tablet 1    traZODone (DESYREL) 100 MG tablet Take 1 tablet by mouth nightly 60 tablet 1    albuterol sulfate  (90 Base) MCG/ACT inhaler 2 puffs every 4 hours by inhalation route. prn wheeze 18 g 5    aspirin EC 81 MG EC tablet Take 1 tablet by mouth daily 30 tablet 11    [DISCONTINUED] omeprazole (PRILOSEC) 40 MG delayed release capsule TAKE ONE CAPSULE BY MOUTH ONCE DAILY 30 capsule 2    Multiple Vitamin (VITAMIN B COMPLEX W/C) TABS TAKE ONE TABLET BY MOUTH ONCE DAILY 30 tablet 1    [DISCONTINUED] gabapentin (NEURONTIN) 600 MG tablet TAKE ONE TABLET THREE TIMES A DAY 90 tablet 2    diclofenac sodium (VOLTAREN) 1 % GEL APPLY TWO GRAMS TOPICALLY EVERY 8 HOURS AS NEEDED FOR 30 DAYS 100 g 5    sildenafil (VIAGRA) 100 MG tablet sildenafil 100 mg tablet   TAKE 1 TABLET BY MOUTH AS DIRECTED 30 MINS PRIOR TO SEXUAL INTERCOURSE ON AN EMPTY STOMACH.       fluticasone (FLONASE) 50 MCG/ACT nasal spray fluticasone propionate 50 mcg/actuation nasal spray,suspension   SPRAY ONE SPRAY into each nostril every DAY      b complex-C-E-zinc (STRESS FORMULA W/ ZINC) tablet Take 1 tablet by mouth daily      buPROPion (WELLBUTRIN XL) 300 MG extended release tablet       diclofenac sodium (VOLTAREN) 1 % GEL       testosterone (ANDROGEL; TESTIM) 50 MG/5GM (1%) GEL 1% gel       atorvastatin (LIPITOR) 80 MG tablet 1 tablet by PEG Tube route nightly 90 tablet 3             Past Medical History:   Diagnosis Date    ADHD     Arthritis     Bipolar 1 disorder ([facility] [address]     Depression     Hypertension     Psychiatric disorder 3/29/2022    Sleep apnea        Past Surgical History:   Procedure Laterality Date    FRACTURE SURGERY Right     FX Tibia / Fibula    GASTROSTOMY TUBE PLACEMENT N/A 5/7/2020    EGD PEG TUBE PLACEMENT performed by Efren Masters MD at [address] Right     Remove Plate    SHOULDER ARTHROSCOPY Right 11/8/2019    RIGHT SHOULDER ARTHROSCOPY,  ROTATOR CUFF REPAIR, SUBACROMIC DECOMPRESSION, LABRIAL DEBRIDEMENT performed by Phong oSfia MD at [address]      x 4    UPPER GASTROINTESTINAL ENDOSCOPY N/A 5/7/2020    EGD BIOPSY performed by Efren Masters MD at [address] ENDOSCOPY             No family history on file. Review of Systems      Chemistry        Component Value Date/Time     03/22/2022 1535    K 4.8 03/22/2022 1535    K 5.0 05/09/2020 0400     03/22/2022 1535    CO2 24 03/22/2022 1535    BUN 17 03/22/2022 1535    CREATININE 1.2 03/22/2022 1535        Component Value Date/Time    CALCIUM 9.9 03/22/2022 1535    ALKPHOS 131 (H) 03/22/2022 1535    AST 22 03/22/2022 1535    ALT 26 03/22/2022 1535    BILITOT 0.4 03/22/2022 1535            NO vitals  as this was a virtual visit during cvod19 pandemic  He is appropriate. Well-groomed.     Lab Results   Component Value Date    WBC 8.6 02/10/2022    HGB 14.0 02/10/2022    HCT 42.4 02/10/2022    MCV 91.9 02/10/2022     02/10/2022     Lab Results   Component Value Date    LABA1C 5.3 04/22/2020     Lab Results   Component Value Date    .4 04/22/2020     Lab Results   Component Value Date    LABA1C 5.3 04/22/2020     No components found for: CHLPL  Lab Results   Component Value Date    TRIG 190 (H) 03/22/2022    TRIG 104 11/15/2021    TRIG 230 (H) 04/23/2020     Lab Results   Component Value Date    HDL 52 03/22/2022    HDL 53 11/15/2021    HDL 41 04/23/2020

## 2022-07-13 ENCOUNTER — TELEPHONE (OUTPATIENT)
Dept: ADMINISTRATIVE | Age: 57
End: 2022-07-13

## 2022-07-13 NOTE — TELEPHONE ENCOUNTER
Submitted PA for Esomeprazole Magnesium   [facility] Esomeprazole Magnesium  STATUS: DENIED. LETTER ATTACHED. If this requires a response please respond to the pool. [address]). Please advise patient thank you.

## 2022-07-14 ENCOUNTER — TELEPHONE (OUTPATIENT)
Dept: ADMINISTRATIVE | Age: 57
End: 2022-07-14

## 2022-07-14 RX ORDER — PANTOPRAZOLE SODIUM 40 MG/1
40 TABLET, DELAYED RELEASE ORAL
Qty: 60 TABLET | Refills: 1 | Status: SHIPPED | OUTPATIENT
Start: 2022-07-14

## 2022-07-14 NOTE — TELEPHONE ENCOUNTER
Submitted PA for Pantoprazole Sodium   [facility]ia [facility] Key: [id number] STATUS: DENIED. LETTER ATTACHED. If this requires a response please respond to the pool. [address]). Please advise patient thank you.

## 2022-07-15 NOTE — TELEPHONE ENCOUNTER
I called [place] [employer] and we need to have 2 diagnosis on the PA but the 2nd diagnosis can only be certain ones. They are faxing me approved 2nd diagnosis' for the medication. I will forward them on to you.

## 2022-07-19 ENCOUNTER — TELEPHONE (OUTPATIENT)
Dept: FAMILY MEDICINE CLINIC | Age: 57
End: 2022-07-19

## 2022-07-19 NOTE — TELEPHONE ENCOUNTER
Pt states that he thought something different was being called in for heartburn or something was changing. States everything is the same. Please advise.

## 2022-07-19 NOTE — TELEPHONE ENCOUNTER
He has only been on omeprazole . Thank you. I actually did order protonix but this was denied perhaps because of dose and frequency?

## 2022-08-17 ENCOUNTER — TELEPHONE (OUTPATIENT)
Dept: FAMILY MEDICINE CLINIC | Age: 57
End: 2022-08-17

## 2022-08-17 NOTE — TELEPHONE ENCOUNTER
Can establish with mike.  Give apt if interest. Otherwise call below    Other option    [facility] they have a Psych DARLIN, Jeff Rater,  [phone]

## 2022-08-17 NOTE — TELEPHONE ENCOUNTER
----- Message from Lo Thalia sent at 8/17/2022  9:07 AM EDT -----  Subject: Referral Request    Reason for referral request? Psychiatrist  Provider patient wants to be referred to(if known):     Provider Phone Number(if known): Additional Information for Provider?  Patient would like for office staff   to refer him to Psych Physician due to anger/ family issues, stated he's   unable to control anger and needs Specialist  ---------------------------------------------------------------------------  --------------  Sameer GARCIA    [phone]; OK to leave message on voicemail  ---------------------------------------------------------------------------  --------------

## 2022-08-18 RX ORDER — TRAZODONE HYDROCHLORIDE 100 MG/1
TABLET ORAL
Qty: 60 TABLET | Refills: 1 | Status: SHIPPED | OUTPATIENT
Start: 2022-08-18

## 2022-08-18 RX ORDER — MEMANTINE HYDROCHLORIDE 5 MG/1
5 TABLET ORAL DAILY
Qty: 60 TABLET | Refills: 1 | Status: SHIPPED | OUTPATIENT
Start: 2022-08-18

## 2022-09-01 ENCOUNTER — TELEPHONE (OUTPATIENT)
Dept: FAMILY MEDICINE CLINIC | Age: 57
End: 2022-09-01

## 2022-09-01 RX ORDER — DEXLANSOPRAZOLE 30 MG/1
CAPSULE, DELAYED RELEASE ORAL
Qty: 30 CAPSULE | Refills: 0 | Status: SHIPPED | OUTPATIENT
Start: 2022-09-01

## 2022-09-01 NOTE — TELEPHONE ENCOUNTER
Left a message on the recorder - might have a problem, would like a call back from the nurse.    Spoke with patient about below message. Patient was driving and could not take down information and asked me to call [facility]. I spoke with [facility] and she states can you send another referral because the GI you recommend is booked until November.

## 2022-09-01 NOTE — TELEPHONE ENCOUNTER
----- Message from Iram Vera sent at 8/31/2022  5:00 PM EDT -----  Subject: Referral Request    Reason for referral request? Patient wants a referral to a different   gastroenterologist because the one he was referred to has no openings   until November. Provider patient wants to be referred to(if known):     Provider Phone Number(if known): Additional Information for Provider?  Please call to let him know.   ---------------------------------------------------------------------------  --------------  Warden De Leon INFO    [phone]; OK to leave message on voicemail  ---------------------------------------------------------------------------  --------------

## 2022-09-01 NOTE — TELEPHONE ENCOUNTER
I am sorry to hear this has not worked    Please follow up with GI referral from [facility].       [address], MD   [address] [place] [address] Drive., [address] [place] [address]   Phone: [phone]   Fax: [phone]

## 2022-09-02 ENCOUNTER — TELEPHONE (OUTPATIENT)
Dept: ADMINISTRATIVE | Age: 57
End: 2022-09-02

## 2022-09-04 RX ORDER — CEPHALEXIN 500 MG/1
500 CAPSULE ORAL 4 TIMES DAILY
Qty: 28 CAPSULE | Refills: 0 | Status: SHIPPED | OUTPATIENT
Start: 2022-09-04 | End: 2022-09-11
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
## 2022-09-06 RX ORDER — BUSPIRONE HYDROCHLORIDE 15 MG/1
TABLET ORAL
Qty: 60 TABLET | Refills: 0 | Status: SHIPPED | OUTPATIENT
Start: 2022-09-06

## 2022-09-06 RX ORDER — BUPROPION HYDROCHLORIDE 150 MG/1
TABLET ORAL
Qty: 30 TABLET | Refills: 0 | Status: SHIPPED | OUTPATIENT
Start: 2022-09-06

## 2022-09-06 RX ORDER — GABAPENTIN 800 MG/1
TABLET ORAL
Qty: 90 TABLET | Refills: 1 | Status: SHIPPED | OUTPATIENT
Start: 2022-09-06 | End: 2022-10-06

## 2022-09-06 NOTE — TELEPHONE ENCOUNTER
RECEIVED DENIAL:  The plan states to  RESUBMIT USING [address]. LETTER ATTACHED. RESubmitted PA for Dexilant   [facility]ia [facility] Key: [id number] STATUS:  APPROVED. LETTER ATTACHED. If this requires a response please respond to the pool. [address]). Please advise patient thank you.

## 2022-09-06 NOTE — TELEPHONE ENCOUNTER
Medication:   Requested Prescriptions     Pending Prescriptions Disp Refills    buPROPion (WELLBUTRIN XL) 150 MG extended release tablet [Pharmacy Med Name: bupropion HCl  mg 24 hr tablet, extended release] 30 tablet 0     Sig: take 1 tablet by mouth every morning in addition to your 300mg dose    busPIRone (BUSPAR) 15 MG tablet [Pharmacy Med Name: buspirone 15 mg tablet] 60 tablet 0     Sig: take 1/2 tablet by mouth two times daily        Last Filled:                        6/30/22                                  Patient Phone Number: [phone] (home)     Last appt: 7/12/2022   Next appt: Visit date not found    Last OARRS:   RX Monitoring 8/12/2020   Periodic Controlled Substance Monitoring Possible medication side effects, risk of tolerance/dependence & alternative treatments discussed.

## 2022-09-13 ENCOUNTER — TELEPHONE (OUTPATIENT)
Dept: FAMILY MEDICINE CLINIC | Age: 57
End: 2022-09-13

## 2022-09-13 RX ORDER — DICLOFENAC SODIUM 75 MG/1
TABLET, DELAYED RELEASE ORAL
Qty: 28 TABLET | Refills: 0 | Status: SHIPPED | OUTPATIENT
Start: 2022-09-13

## 2022-09-13 NOTE — TELEPHONE ENCOUNTER
If worsening pain,fever swelling increased drainage should be seen or follow up with ortho  I can order NSAID medication, but would not order narcotic or opiods
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
## 2022-09-13 NOTE — TELEPHONE ENCOUNTER
Patient informed and verbalized understanding. He says pain is keeping him up but denies any fever or other symptoms other than shooting pain. Patient aware to follow up with ortho and would also like nsaid sent.

## 2022-09-15 ENCOUNTER — TELEPHONE (OUTPATIENT)
Dept: ADMINISTRATIVE | Age: 57
End: 2022-09-15

## 2022-09-15 NOTE — TELEPHONE ENCOUNTER
Submitted PA for DEXILANT  [facility] Key: [id number] STATUS: APPROVED. If this requires a response please respond to the pool ( [facility] [address]). Thank you please advise patient.

## 2022-09-26 DIAGNOSIS — Z86.73 HISTORY OF STROKE IN ADULTHOOD: ICD-10-CM

## 2022-09-26 RX ORDER — LORATADINE 10 MG/1
TABLET ORAL
Qty: 30 TABLET | Refills: 2 | Status: SHIPPED | OUTPATIENT
Start: 2022-09-26

## 2022-09-26 RX ORDER — AMLODIPINE BESYLATE 5 MG/1
TABLET ORAL
Qty: 30 TABLET | Refills: 2 | Status: SHIPPED | OUTPATIENT
Start: 2022-09-26

## 2022-09-26 NOTE — TELEPHONE ENCOUNTER
Be sure pt has apt new pcp  MD Danny Ulrich MD  [facility] internal Medicine  [address] [facility] [address]  [address] scheduling  [phone]

## 2022-09-26 NOTE — LETTER
[address] [facility] [address] [place] [address]  Phone: [phone]  Fax: [phone]    Jeramy Liang MD        September 26, 2022    Oral RODRIGO [address] [facility] [address]      Dear Oral:    Your medication has been filled. Please be sure to establish with a new doctor to be sure you do not run out of medication. If you have any questions or concerns, please don't hesitate to call.     Sincerely,        Jeramy Liang MD

## 2022-10-06 RX ORDER — BUPROPION HYDROCHLORIDE 150 MG/1
TABLET ORAL
Qty: 30 TABLET | Refills: 0 | OUTPATIENT
Start: 2022-10-06

## 2022-10-06 NOTE — TELEPHONE ENCOUNTER
Pt was to get apt with psychiatrist for this. If he has not done so I will fill 1 month, but needs new pcp. Call [facility]. Remind him he needs to do this to get medication refills and medical care since I am retiring.

## 2022-10-10 RX ORDER — BUPROPION HYDROCHLORIDE 300 MG/1
TABLET ORAL
Qty: 30 TABLET | Refills: 2 | OUTPATIENT
Start: 2022-10-10

## 2022-10-10 NOTE — TELEPHONE ENCOUNTER
Should be getting the from other health provider. Let him know needs new pcp.   MD Michael Casillas MD  [facility] internal Medicine  [address] [facility] [address]  [address] scheduling  [phone]

## 2022-10-28 RX ORDER — GABAPENTIN 800 MG/1
TABLET ORAL
Qty: 90 TABLET | Refills: 0 | OUTPATIENT
Start: 2022-10-28

## 2022-10-28 RX ORDER — BUSPIRONE HYDROCHLORIDE 15 MG/1
TABLET ORAL
Qty: 60 TABLET | Refills: 0 | OUTPATIENT
Start: 2022-10-28

## 2022-10-28 NOTE — TELEPHONE ENCOUNTER
Medication:   Requested Prescriptions     Pending Prescriptions Disp Refills    gabapentin (NEURONTIN) 800 MG tablet [Pharmacy Med Name: gabapentin 800 mg tablet] 90 tablet 0     Sig: take one tablet three times a day for pain and mood    busPIRone (BUSPAR) 15 MG tablet [Pharmacy Med Name: buspirone 15 mg tablet] 60 tablet 0     Sig: take 1/2 tablet by mouth two times daily       Last Filled:      Patient Phone Number: [phone] (home)     Last appt: 7/12/2022   Next appt: Visit date not found

## 2022-11-17 ENCOUNTER — TELEPHONE (OUTPATIENT)
Dept: FAMILY MEDICINE CLINIC | Age: 57
End: 2022-11-17

## 2022-11-17 ENCOUNTER — TELEPHONE (OUTPATIENT)
Dept: INTERNAL MEDICINE CLINIC | Age: 57
End: 2022-11-17

## 2022-11-17 NOTE — TELEPHONE ENCOUNTER
Called to verify spelling of Dr Conner Lank name as he is going to sign until pt sees Dr Fernando Chun

## 2022-11-17 NOTE — TELEPHONE ENCOUNTER
Pt was a [facility] pt and is needing home care orders signed to continue with the skilled nursing. He has an appt set up to see tong on the 23rd. Pt is asking if dr Mayra Beyer or dr Gilma Feldman will sign. Please advise. Spoke to darshan(practice manager) and she advised pt is no longer a dent pt so there is requirement for anyone from dent to sign off.

## 2022-11-17 NOTE — TELEPHONE ENCOUNTER
----- Message from Laury Elizabeth sent at 11/16/2022 11:48 AM EST -----  Subject: Message to Provider    QUESTIONS  Information for Provider? URGENT? Patient needs an ok from this office to   approve home health care asap. They are supposed to see the patient   tomorrow. He is seeing Dr. Mariza Dias on 11/23 for the first time as he was   with Dr. Natalie Quinonez. Please call asap to discuss. Fax will be coming. Call   with questions.  ---------------------------------------------------------------------------  --------------  Lia Chacko INFO  [phone]; OK to leave message on voicemail  ---------------------------------------------------------------------------  --------------  SCRIPT ANSWERS  Relationship to Patient? Third Party  Third Party Type? Home Health Care? Representative Name?  Jeremy Valentine

## 2023-07-09 NOTE — TELEPHONE ENCOUNTER
- Daily CBC while inpatient  - Transfuse for hgb < 7, PLTS < 10K or <50k if bleeding  - Ppx bactrim, fluconazole, and acyclovir; on cefepime    RETURNING MISSED CALL

## (undated) DEVICE — MAJOR SET UP PK

## (undated) DEVICE — MERCY HEALTH WEST TURNOVER: Brand: MEDLINE INDUSTRIES, INC.

## (undated) DEVICE — Z INACTIVE USE 2660664 SOLUTION IRRIG 3000ML 0.9% SOD CHL USP UROMATIC PLAS CONT

## (undated) DEVICE — BLADE SHV L13CM DIA4MM DISECT AGG COOLCUT

## (undated) DEVICE — SHEET,DRAPE,53X77,STERILE: Brand: MEDLINE

## (undated) DEVICE — TUBING, SUCTION, 1/4" X 12', STRAIGHT: Brand: MEDLINE

## (undated) DEVICE — GARMENT COMPR L FOR 23IN CALF FLOTRN

## (undated) DEVICE — GOWN SIRUS NONREIN XL W/TWL: Brand: MEDLINE INDUSTRIES, INC.

## (undated) DEVICE — TUBING PMP L16FT MAIN DISP FOR AR-6400 AR-6475

## (undated) DEVICE — 3M™ TEGADERM™ TRANSPARENT FILM DRESSING FRAME STYLE, 1626W, 4 IN X 4-3/4 IN (10 CM X 12 CM), 50/CT 4CT/CASE: Brand: 3M™ TEGADERM™

## (undated) DEVICE — BUR SHV L13CM DIA4MM 8 FLUT OVL FOR RAP AGG BNE RESECT

## (undated) DEVICE — 3M™ STERI-DRAPE™ U-DRAPE 1015: Brand: STERI-DRAPE™

## (undated) DEVICE — Device

## (undated) DEVICE — SUTURE FIBERWIRE SZ 2 W/ TAPERED NEEDLE BLUE L38IN NONABSORB BLU L26.5MM 1/2 CIRCLE AR7200

## (undated) DEVICE — 3M™ COBAN™ NL STERILE NON-LATEX SELF-ADHERENT WRAP, 2084S, 4 IN X 5 YD (10 CM X 4,5 M), 18 ROLLS/CASE: Brand: 3M™ COBAN™

## (undated) DEVICE — 1010 S-DRAPE TOWEL DRAPE 10/BX: Brand: STERI-DRAPE™

## (undated) DEVICE — SUTURE PROL SZ 0 L30IN NONABSORBABLE BLU MO-6 L26MM 1/2 CIR 8418H

## (undated) DEVICE — 3M™ IOBAN™ 2 ANTIMICROBIAL INCISE DRAPE 6650EZ: Brand: IOBAN™ 2

## (undated) DEVICE — STRIP,CLOSURE,WOUND,MEDI-STRIP,1/2X4: Brand: MEDLINE

## (undated) DEVICE — INTENDED FOR TISSUE SEPARATION, AND OTHER PROCEDURES THAT REQUIRE A SHARP SURGICAL BLADE TO PUNCTURE OR CUT.: Brand: BARD-PARKER ® STAINLESS STEEL BLADES

## (undated) DEVICE — SPLINT WRST FA R 7

## (undated) DEVICE — BAG DRNGE 2000ML UROLOGY ANTI REFLX CHMBR SAMP PRT

## (undated) DEVICE — PAD DRY FLOOR ABS 32X58IN GRN

## (undated) DEVICE — ELECTRODE PT RET AD L9FT HI MOIST COND ADH HYDRGEL CORDED

## (undated) DEVICE — NEEDLE SPNL L3.5IN PNK HUB S STL REG WALL FIT STYL W/ QNCKE

## (undated) DEVICE — FORCEPS BX L240CM ODSEC1.8MM YEL MULT SAMP MULTBIT DISP

## (undated) DEVICE — Z DISCONTINUED GLOVE SURG SZ 7.5 L12IN FNGR THK13MIL WHT ISOLEX

## (undated) DEVICE — DRAPE,U/SHT,SPLIT,FILM,60X84,STERILE: Brand: MEDLINE

## (undated) DEVICE — NEEDLE SUT FOR EXPRESSEW LL AND LLL SUT PASS EXPRESSEW LLL

## (undated) DEVICE — SUTURE SUTTAPE L40IN DIA1.3MM NONABSORBABLE WHT BLU L26.5MM AR7500

## (undated) DEVICE — PACK,SHOULDER,DRAPE,POUCH: Brand: MEDLINE

## (undated) DEVICE — PROBE ABLAT XL 90DEG ASPIR BPLR RF 1 PC ELECTRD ERGO HNDL

## (undated) DEVICE — SHOULDER CANNULA SET WITHOUT FENESTRATIONS, 5.5 MM (I.D.) X 70 MM: Brand: CONMED

## (undated) DEVICE — GLOVE ORANGE PI 7 1/2   MSG9075

## (undated) DEVICE — SPONGE GZ W4XL4IN COT 12 PLY TYP VII WVN C FLD DSGN

## (undated) DEVICE — CHLORAPREP 26ML ORANGE

## (undated) DEVICE — CANNULA ARTHSCP L7CM ID8.25MM TRNSLUC THRD FLX W/ NO SQUIRT

## (undated) DEVICE — 4-PORT MANIFOLD: Brand: NEPTUNE 2

## (undated) DEVICE — SUTURE PROL SZ 3-0 L18IN NONABSORBABLE BLU L30MM FS-1 3/8 8663G